# Patient Record
Sex: FEMALE | Race: WHITE | NOT HISPANIC OR LATINO | Employment: OTHER | URBAN - METROPOLITAN AREA
[De-identification: names, ages, dates, MRNs, and addresses within clinical notes are randomized per-mention and may not be internally consistent; named-entity substitution may affect disease eponyms.]

---

## 2023-02-05 PROBLEM — N28.1 ACQUIRED CYST OF KIDNEY: Status: ACTIVE | Noted: 2023-02-05

## 2023-02-05 PROBLEM — I47.10 PAROXYSMAL SUPRAVENTRICULAR TACHYCARDIA (CMS-HCC): Status: ACTIVE | Noted: 2023-02-05

## 2023-02-05 PROBLEM — E53.8 VITAMIN B12 DEFICIENCY: Status: ACTIVE | Noted: 2023-02-05

## 2023-02-05 PROBLEM — J43.9 EMPHYSEMA LUNG (MULTI): Status: ACTIVE | Noted: 2023-02-05

## 2023-02-05 PROBLEM — R94.2 ABNORMAL PULMONARY FUNCTION TEST: Status: ACTIVE | Noted: 2023-02-05

## 2023-02-05 PROBLEM — M54.2 NECK PAIN: Status: ACTIVE | Noted: 2023-02-05

## 2023-02-05 PROBLEM — G47.33 OBSTRUCTIVE SLEEP APNEA: Status: ACTIVE | Noted: 2023-02-05

## 2023-02-05 PROBLEM — N35.919 URETHRAL STRICTURE: Status: ACTIVE | Noted: 2023-02-05

## 2023-02-05 PROBLEM — M47.816 LUMBAR SPONDYLOSIS: Status: ACTIVE | Noted: 2023-02-05

## 2023-02-05 PROBLEM — E55.9 VITAMIN D DEFICIENCY: Status: ACTIVE | Noted: 2023-02-05

## 2023-02-05 PROBLEM — M85.9 DISORDER OF BONE DENSITY AND STRUCTURE, UNSPECIFIED: Status: ACTIVE | Noted: 2023-02-05

## 2023-02-05 PROBLEM — M17.9 OSTEOARTHRITIS, KNEE: Status: ACTIVE | Noted: 2023-02-05

## 2023-02-05 PROBLEM — K59.00 CONSTIPATION: Status: ACTIVE | Noted: 2023-02-05

## 2023-02-05 PROBLEM — R21 ERYTHEMATOUS RASH: Status: ACTIVE | Noted: 2023-02-05

## 2023-02-05 PROBLEM — F41.9 ANXIETY: Status: ACTIVE | Noted: 2023-02-05

## 2023-02-05 PROBLEM — L95.9 VASCULITIS OF SKIN: Status: ACTIVE | Noted: 2023-02-05

## 2023-02-05 PROBLEM — I48.92 ATRIAL FLUTTER (MULTI): Status: ACTIVE | Noted: 2023-02-05

## 2023-02-05 PROBLEM — M51.9 THORACIC DISC DISEASE: Status: ACTIVE | Noted: 2023-02-05

## 2023-02-05 PROBLEM — I48.0 PAROXYSMAL ATRIAL FIBRILLATION (MULTI): Status: ACTIVE | Noted: 2023-02-05

## 2023-02-05 PROBLEM — R89.6 ABNORMAL BLADDER CYTOLOGY: Status: ACTIVE | Noted: 2023-02-05

## 2023-02-05 PROBLEM — Z86.018 HISTORY OF BENIGN BLADDER TUMOR: Status: ACTIVE | Noted: 2023-02-05

## 2023-02-05 PROBLEM — K57.30 DIVERTICULOSIS OF COLON: Status: ACTIVE | Noted: 2023-02-05

## 2023-02-05 PROBLEM — K76.89 HEPATIC CYST: Status: ACTIVE | Noted: 2023-02-05

## 2023-02-05 PROBLEM — R79.89 ELEVATED HOMOCYSTEINE: Status: ACTIVE | Noted: 2023-02-05

## 2023-02-05 PROBLEM — I51.9 DIASTOLIC DYSFUNCTION, LEFT VENTRICLE: Status: ACTIVE | Noted: 2023-02-05

## 2023-02-05 PROBLEM — D64.9 ANEMIA: Status: ACTIVE | Noted: 2023-02-05

## 2023-02-05 PROBLEM — G25.0 ESSENTIAL TREMOR: Status: ACTIVE | Noted: 2023-02-05

## 2023-02-05 PROBLEM — K21.9 CHRONIC GERD: Status: ACTIVE | Noted: 2023-02-05

## 2023-02-05 PROBLEM — R35.1 NOCTURIA: Status: ACTIVE | Noted: 2023-02-05

## 2023-02-05 PROBLEM — M79.2 NEUROPATHIC PAIN OF LEFT LOWER EXTREMITY: Status: ACTIVE | Noted: 2023-02-05

## 2023-02-05 PROBLEM — K64.0 GRADE I HEMORRHOIDS: Status: ACTIVE | Noted: 2023-02-05

## 2023-02-05 PROBLEM — M47.812 SPONDYLOSIS OF CERVICAL REGION WITHOUT MYELOPATHY OR RADICULOPATHY: Status: ACTIVE | Noted: 2023-02-05

## 2023-02-05 PROBLEM — M50.90 CERVICAL DISC DISORDER: Status: ACTIVE | Noted: 2023-02-05

## 2023-02-05 RX ORDER — HYDROGEN PEROXIDE 3 %
1 SOLUTION, NON-ORAL MISCELLANEOUS DAILY
COMMUNITY
End: 2023-10-30

## 2023-02-05 RX ORDER — IBUPROFEN 200 MG
2 CAPSULE ORAL DAILY
COMMUNITY
End: 2023-03-14

## 2023-02-05 RX ORDER — LANOLIN ALCOHOL/MO/W.PET/CERES
1 CREAM (GRAM) TOPICAL EVERY OTHER DAY
COMMUNITY
End: 2023-03-14

## 2023-02-05 RX ORDER — METOPROLOL SUCCINATE 25 MG/1
25 TABLET, EXTENDED RELEASE ORAL DAILY
COMMUNITY
End: 2024-01-29 | Stop reason: SDUPTHER

## 2023-02-05 RX ORDER — MULTIVIT WITH MINERALS/HERBS
1 TABLET ORAL EVERY OTHER DAY
COMMUNITY
End: 2023-03-14

## 2023-02-05 RX ORDER — LACTASE 9000 UNIT
1 TABLET,CHEWABLE ORAL AS NEEDED
COMMUNITY
End: 2023-03-14

## 2023-02-05 RX ORDER — TIZANIDINE 2 MG/1
1 TABLET ORAL EVERY 6 HOURS PRN
COMMUNITY
End: 2023-03-14

## 2023-02-24 ENCOUNTER — DOCUMENTATION (OUTPATIENT)
Dept: PRIMARY CARE | Facility: CLINIC | Age: 78
End: 2023-02-24
Payer: MEDICARE

## 2023-03-14 ENCOUNTER — PATIENT OUTREACH (OUTPATIENT)
Dept: PRIMARY CARE | Facility: CLINIC | Age: 78
End: 2023-03-14
Payer: MEDICARE

## 2023-03-14 DIAGNOSIS — D64.9 ANEMIA, UNSPECIFIED TYPE: ICD-10-CM

## 2023-03-14 DIAGNOSIS — I48.92 ATRIAL FLUTTER, UNSPECIFIED TYPE (MULTI): ICD-10-CM

## 2023-03-14 PROCEDURE — 99490 CHRNC CARE MGMT STAFF 1ST 20: CPT | Performed by: INTERNAL MEDICINE

## 2023-03-14 RX ORDER — OMEGA-3 FATTY ACIDS/FISH OIL 340-1000MG
CAPSULE ORAL
COMMUNITY
End: 2023-09-08 | Stop reason: WASHOUT

## 2023-03-14 RX ORDER — ACETAMINOPHEN 500 MG
2 TABLET ORAL DAILY
COMMUNITY

## 2023-03-18 PROBLEM — M54.2 NECK PAIN: Status: RESOLVED | Noted: 2023-02-05 | Resolved: 2023-03-18

## 2023-03-18 PROBLEM — Z00.00 ROUTINE ADULT HEALTH MAINTENANCE: Chronic | Status: ACTIVE | Noted: 2023-03-18

## 2023-03-18 PROBLEM — Z00.00 ROUTINE ADULT HEALTH MAINTENANCE: Status: ACTIVE | Noted: 2023-03-18

## 2023-03-18 PROBLEM — Z87.19 H/O GASTRITIS: Status: ACTIVE | Noted: 2023-03-18

## 2023-03-18 PROBLEM — Z71.89 CARDIAC RISK COUNSELING: Status: ACTIVE | Noted: 2023-03-18

## 2023-03-18 PROBLEM — Z87.19 H/O ESOPHAGITIS: Status: ACTIVE | Noted: 2023-03-18

## 2023-03-18 PROBLEM — Z71.89 ADVANCED DIRECTIVES, COUNSELING/DISCUSSION: Status: ACTIVE | Noted: 2023-03-18

## 2023-03-18 PROBLEM — Z13.89 SCREENING FOR MULTIPLE CONDITIONS: Status: ACTIVE | Noted: 2023-03-18

## 2023-03-24 ENCOUNTER — OFFICE VISIT (OUTPATIENT)
Dept: PRIMARY CARE | Facility: CLINIC | Age: 78
End: 2023-03-24
Payer: MEDICARE

## 2023-03-24 VITALS
WEIGHT: 151 LBS | HEART RATE: 78 BPM | SYSTOLIC BLOOD PRESSURE: 122 MMHG | BODY MASS INDEX: 23.65 KG/M2 | DIASTOLIC BLOOD PRESSURE: 77 MMHG

## 2023-03-24 DIAGNOSIS — I48.0 PAROXYSMAL ATRIAL FIBRILLATION (MULTI): ICD-10-CM

## 2023-03-24 DIAGNOSIS — G47.33 OBSTRUCTIVE SLEEP APNEA: ICD-10-CM

## 2023-03-24 DIAGNOSIS — K59.00 CONSTIPATION, UNSPECIFIED CONSTIPATION TYPE: ICD-10-CM

## 2023-03-24 DIAGNOSIS — I48.92 ATRIAL FLUTTER, UNSPECIFIED TYPE (MULTI): ICD-10-CM

## 2023-03-24 DIAGNOSIS — I47.10 PAROXYSMAL SUPRAVENTRICULAR TACHYCARDIA (CMS-HCC): ICD-10-CM

## 2023-03-24 DIAGNOSIS — J43.9 PULMONARY EMPHYSEMA, UNSPECIFIED EMPHYSEMA TYPE (MULTI): ICD-10-CM

## 2023-03-24 DIAGNOSIS — I48.20 CHRONIC ATRIAL FIBRILLATION, UNSPECIFIED (MULTI): ICD-10-CM

## 2023-03-24 DIAGNOSIS — E55.9 VITAMIN D DEFICIENCY: ICD-10-CM

## 2023-03-24 DIAGNOSIS — N18.31 CHRONIC KIDNEY DISEASE, STAGE 3A (MULTI): ICD-10-CM

## 2023-03-24 DIAGNOSIS — N18.9 CHRONIC KIDNEY DISEASE, UNSPECIFIED CKD STAGE: ICD-10-CM

## 2023-03-24 DIAGNOSIS — E53.8 VITAMIN B12 DEFICIENCY: ICD-10-CM

## 2023-03-24 DIAGNOSIS — M06.9 RHEUMATOID ARTHRITIS, INVOLVING UNSPECIFIED SITE, UNSPECIFIED WHETHER RHEUMATOID FACTOR PRESENT (MULTI): Primary | ICD-10-CM

## 2023-03-24 PROCEDURE — 1157F ADVNC CARE PLAN IN RCRD: CPT | Performed by: INTERNAL MEDICINE

## 2023-03-24 PROCEDURE — 1036F TOBACCO NON-USER: CPT | Performed by: INTERNAL MEDICINE

## 2023-03-24 PROCEDURE — 99215 OFFICE O/P EST HI 40 MIN: CPT | Performed by: INTERNAL MEDICINE

## 2023-03-24 RX ORDER — LANOLIN ALCOHOL/MO/W.PET/CERES
100 CREAM (GRAM) TOPICAL EVERY OTHER DAY
COMMUNITY
End: 2023-11-02 | Stop reason: ALTCHOICE

## 2023-03-24 RX ORDER — MULTIVITAMIN/IRON/FOLIC ACID 18MG-0.4MG
1 TABLET ORAL EVERY OTHER DAY
COMMUNITY
End: 2023-11-02 | Stop reason: ALTCHOICE

## 2023-03-24 ASSESSMENT — PATIENT HEALTH QUESTIONNAIRE - PHQ9
2. FEELING DOWN, DEPRESSED OR HOPELESS: NOT AT ALL
SUM OF ALL RESPONSES TO PHQ9 QUESTIONS 1 AND 2: 0
1. LITTLE INTEREST OR PLEASURE IN DOING THINGS: NOT AT ALL

## 2023-03-24 NOTE — PROGRESS NOTES
ASSESSMENT/PLAN  Problem List Items Addressed This Visit          High    Atrial flutter (CMS/HCC)    Overview     s/p EP cardiology consult 2022  [id number] score of 3 for age and female gender.   Continue with Eliquis, BB   Monitor           Relevant Orders    Comprehensive Metabolic Panel    CBC and Auto Differential    Emphysema lung (CMS/[facility])    Current Assessment & Plan     Not medicated  Seeing Pulm soon  Await recs         Paroxysmal atrial fibrillation (CMS/[facility])    Overview     7/5/22: Atrial fibrillation with rapid ventricular response, ? Secondary to colitis/infection  Holter x 40 hours 8/22 did not see AF, but [id number] score of 3  On Eliquis and BB  Monitor         Relevant Orders    In-Center Sleep Study (Non-Sleep Provider Only)    Comprehensive Metabolic Panel    CBC and Auto Differential    Paroxysmal supraventricular tachycardia (CMS/HCC)    Overview     Holter 10/22: Sinus karen/tachycardia. , average 74. 741 PVCs (0.05% burden), 8565 PSVCs (0.58%), 23 occurrences of PSVT, longest 34 beats, fastest 162  1 reported event  On BB (and Eliquis due to hx PAF)  Monitor         Current Assessment & Plan     Sleep Study ordered         Relevant Orders    Comprehensive Metabolic Panel    CBC and Auto Differential    Rheumatoid arthritis, involving unspecified site, unspecified whether rheumatoid factor present (CMS/[facility]) - Primary    Overview     Stable  Aymptomatic  Monitor         Relevant Orders    TSH with reflex to Free T4 if abnormal    Comprehensive Metabolic Panel    CBC and Auto Differential       Medium    Constipation    Overview     Resolved with ducolax  monitor         Current Assessment & Plan     Cscope ordered         Relevant Orders    Colonoscopy    Comprehensive Metabolic Panel    CBC and Auto Differential    Obstructive sleep apnea    Overview     DOes not use CPAP         Current Assessment & Plan     Will get PSG         Relevant Orders    In-Center Sleep Study (Non-Sleep  "Provider Only)    Vitamin B12 deficiency    Relevant Orders    Vitamin B12    Vitamin D deficiency    Overview     Goal 40-50  on supplement         Relevant Orders    Vitamin D, Total    Chronic kidney disease, stage 3a    Relevant Orders    Urinalysis with Reflex Microscopic    Comprehensive Metabolic Panel    Lipid Panel     Other Visit Diagnoses       Chronic atrial fibrillation, unspecified (CMS/HCC)        Relevant Orders    TSH with reflex to Free T4 if abnormal    Comprehensive Metabolic Panel    CBC and Auto Differential    Chronic kidney disease, unspecified CKD stage        Relevant Orders    Lipid Panel              Chief Complaint/HPI: 6 month follow up     ROS otherwise negative aside from what was mentioned above in HPI.  Saw Jane  Reviewed both notes   (Copied)  Impressions  1) atrial flutter: No symptomatic recurrence. Remains on metoprolol and Eliquis. I explained her elevated [id number]-PHVx score of 3 for age and female gender. Do recommend that she continue with Eliquis.  She will be establishing with an electrophysiologist later this year.  2) follow-up: 6 months or sooner if needed   ----  IMPRESSIONS:  1.  Paroxysmal atrial arrhythmias, with both atrial flutter and atrial fibrillation documented in July 2022.  The etiology of the rhythms remains unclear, perhaps related to acute illness from colitis at that time.  The patient may also have subclinical obstructive sleep apnea as a contributory.  She appears to have a healthy heart otherwise, with no structural or ischemic disease.  Her atrial arrhythmia burden is felt to be quite low at present, and she is on a simple \"rate control strategy\" with beta-blockers, along with Eliquis anticoagulation, given her [id number] score of at least 3 (female gender, and 2 points for age over 75).  It is not yet clear that she warrants antiarrhythmic therapy or ablation.  2.  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  3.  Benign essential tremor, ongoing in spite of metoprolol therapy.  4.  Possible occult obstructive sleep apnea, as noted above.  RECOMMENDATIONS:  1.  The patient will continue her \"rate control strategy\" for now.  2.  Her Toprol-XL 25 mg daily will be replaced by Inderal LA 60 mg daily, to provide ongoing beta-blockade but also better treatment of her benign essential tremor.  3.  She will continue Eliquis anticoagulation indefinitely.  4.  If her recent event monitor shows a significant burden of atrial arrhythmias, I will consider her for specific antiarrhythmic therapy (e.g. flecainide) or for ablation therapy.  If atrial flutter appears to be her dominant clinical arrhythmia, a cavotricuspid isthmus ablation would certainly be reasonable.  5.  She will follow-up with Dr. Tim and be considered for another sleep study.  6.  She will continue to be seen by Dr. Burnett, with an appointment in 4 to 6 months and then another appointment with me in 12 months.  7.  With documented sustained atrial arrhythmias in the interim, I will be happy to see her and address management  --  Didn't siwtch to Propranolol  Had monitor:   Sinus karen/tachycardia. 31761, average 74. 741 PVCs (0.05% burden), 8565 PSVCs (0.58%), 23 occurrences of PSVT, longest 34 beats, fastest 162 1 reported event 8/22: HR [address], average 94. Rare PACs, PVCs, no AF (40hours) 7 events recorded, all autotriggered     Rec'd sleep study, she hasnt done this yet  Urology rec'd it also    Weaned PPI to 20mg, no issues    She saw Dr Becerril at [facility] (copied):  ASSESSMENT/PLAN:  1. Screening for genitourinary condition - ICD9: V81.6, ICD10: Z13.89 (primary diagnosis)  - UA DIP, URINE (POC)  2. Nocturia - ICD9: 788.43, ICD10: R35.1  - URINALYSIS, WITH MICROSCOPIC  3. Microhematuria - ICD9: 599.72, ICD10: R31.29  urine sent for micro  discussed " voiding log  check if > 30% of noct output  possible PM dose of Lasix around 6 pm if ok with Dr Tim  possible other OAB trials eg Botox vs Interstim  FU with me or REZA   ---  Seeing Pulm next month    Patient Active Problem List   Diagnosis    Abnormal bladder cytology    Abnormal pulmonary function test    Acquired cyst of kidney    Anemia, unspecified    Anxiety    Atrial flutter (CMS/HCC)    Cervical disc disease    Thoracic disc disease    Chronic GERD    Constipation    Diastolic dysfunction, left ventricle    Disorder of bone density and structure, unspecified    Diverticulosis of colon    Elevated homocysteine    Emphysema lung (CMS/HCC)    Erythematous rash    Essential tremor    Grade I hemorrhoids    History of benign bladder tumor    Hepatic cyst    Lumbar spondylosis    Neuropathic pain of left lower extremity    Nocturia    Obstructive sleep apnea    Osteoarthritis, knee    Paroxysmal atrial fibrillation (CMS/HCC)    Paroxysmal supraventricular tachycardia (CMS/HCC)    Spondylosis of cervical region without myelopathy or radiculopathy    Urethral stricture    Vasculitis of skin    Vitamin B12 deficiency    Vitamin D deficiency    Routine adult health maintenance    Advanced directives, counseling/discussion    Cardiac risk counseling    H/O esophagitis    H/O gastritis    Screening for multiple conditions    Rheumatoid arthritis, involving unspecified site, unspecified whether rheumatoid factor present (CMS/HCC)    Chronic kidney disease, stage 3a       ALLERGIES  Amoxicillin-pot clavulanate, Celecoxib, Diazepam, Enoxaparin, Gabapentin, Meloxicam, Mometasone, Naproxen, Nitrofurantoin macrocrystal, Nitrofurantoin monohyd/m-cryst, Piroxicam, Propranolol, Ranitidine hcl, and Amitriptyline    MEDICATIONS  Current Outpatient Medications   Medication Instructions    apixaban (Eliquis) 5 mg tablet 1 tablet, oral, 2 times daily    b complex 0.4 mg tablet 1 tablet, oral, Every other day    calcium  carbonate-vitamin D3 600 mg-20 mcg (800 unit) tablet 2 tablets, oral, Daily    cyanocobalamin (VITAMIN B-12) 100 mcg, oral, Every other day    esomeprazole (NexIUM) 20 mg DR capsule 1 capsule, oral, Daily    metoprolol succinate XL (TOPROL-XL) 25 mg, oral, Daily    omega-3 fatty acids-fish oil (Fish OiL) 340-1,000 mg capsule oral        PHYSICAL EXAM  /77   Pulse 78   Wt 68.5 kg (151 lb)   BMI 23.65 kg/m²   Body mass index is 23.65 kg/m².  Gen: Alert, NAD  HEENT:  PERRLA, EOMI, conjunctiva and sclera normal in appearance  Respiratory:  Lungs CTAB  Cardiovascular:  Heart RRR. No M/R/G, trace LE edema  Neuro:  Gross motor and sensory intact; + ET  Skin:  No suspicious lesions present

## 2023-04-13 ENCOUNTER — PATIENT OUTREACH (OUTPATIENT)
Dept: PRIMARY CARE | Facility: CLINIC | Age: 78
End: 2023-04-13
Payer: MEDICARE

## 2023-04-13 DIAGNOSIS — J43.9 PULMONARY EMPHYSEMA, UNSPECIFIED EMPHYSEMA TYPE (MULTI): ICD-10-CM

## 2023-04-13 DIAGNOSIS — I48.92 ATRIAL FLUTTER, UNSPECIFIED TYPE (MULTI): ICD-10-CM

## 2023-04-13 PROCEDURE — 99490 CHRNC CARE MGMT STAFF 1ST 20: CPT | Performed by: INTERNAL MEDICINE

## 2023-04-13 NOTE — CARE PLAN
HAPPY BIRTHDAY!  Spoke with pt.  She is doing well this month.  BP stable.  Walking more.  Ideally would like to work toward doing lyndon or jazzercise  Scheduled 5/22 for endoscopy/colonoscopy  Will stop eliquis 2 days before procedure    Saw Dr Becerril [facility] (copied from chart):  Lifestyle modifications such as,  Use compression stockings. Lower extremity edema can redistribute intravascularly at nighttime when supine and cause nocturia. Consider referral to occupational therapy for fitting of stockings. , Elevate legs in late afternoon/early evening, approximately four hours before bedtime. , Minimize caffeine and bladder irritants. , Minimize fluid intake four hours before bedtime., and If any risk factors for obstructive sleep apnea, consider referral to sleep medicine. Medical therapy such as,    Timing a low dose diuretic in the afternoon to complete diuresis prior to bedtime.  Pt kept log on input/output.    Ringing in ears  Has history of this issue  Never truly resolved  But recently has gotten louder

## 2023-05-12 ENCOUNTER — PATIENT OUTREACH (OUTPATIENT)
Dept: PRIMARY CARE | Facility: CLINIC | Age: 78
End: 2023-05-12
Payer: MEDICARE

## 2023-05-12 DIAGNOSIS — I48.92 ATRIAL FLUTTER, UNSPECIFIED TYPE (MULTI): ICD-10-CM

## 2023-05-12 DIAGNOSIS — J43.9 PULMONARY EMPHYSEMA, UNSPECIFIED EMPHYSEMA TYPE (MULTI): ICD-10-CM

## 2023-05-12 PROCEDURE — 99490 CHRNC CARE MGMT STAFF 1ST 20: CPT | Performed by: INTERNAL MEDICINE

## 2023-05-12 NOTE — CARE PLAN
Problem: Access to Care Issue  Goal: Assess and Address Access Barriers  Outcome: Progressing   Seeing Dr. Becerril in June to follow up for bladder issues  Received copy to her to record input/output  Goes on 6/12 for in office Sleep Study  [facility]  Colonoscopy rescheduled to July    Copied from  [employer]:  Adelia is a 78 year-old female with a history of atrial fibrillation/flutter, diastolic dysfunction, esophagitis, and degenerative joint disease here for follow-up.    Overall doing well. Denies any recent palpitations, lightheadedness, or loss of consciousness.    Very rarely will have left greater than right lower extremity swelling. She had ordered prescription compression socks when she had leg swelling in the past however by the time she actually received the stockings her leg swelling had gone back to normal.    She established with Dr. Crowell at the end of last year. I reviewed his note from December 2022.    She tells me she is friends with Pippa Perez who was a friend of my wife's late grandmother.    14-day monitor October 2022 revealing predominantly sinus rhythm with no evidence of atrial fibrillation or flutter. Episodes of SVT with longest of 34 beats and fastest at 162 bpm.    Nuclear stress test 9/9/2022: No evidence of ischemia or scar. EF greater than 65%.    48-hour monitor August 2022: Predominantly sinus rhythm with rare PACs and PVCs.    Catheterization September 2013: Angiographically normal right dominant system, EF 70%.     Impressions    1) atrial flutter: No symptomatic recurrence. Remains on metoprolol and Eliquis.     2) follow-up: 12 months or sooner if needed

## 2023-06-13 ENCOUNTER — PATIENT OUTREACH (OUTPATIENT)
Dept: PRIMARY CARE | Facility: CLINIC | Age: 78
End: 2023-06-13
Payer: MEDICARE

## 2023-06-13 ENCOUNTER — TELEPHONE (OUTPATIENT)
Dept: PRIMARY CARE | Facility: CLINIC | Age: 78
End: 2023-06-13

## 2023-06-13 DIAGNOSIS — N18.31 CHRONIC KIDNEY DISEASE, STAGE 3A (MULTI): ICD-10-CM

## 2023-06-13 DIAGNOSIS — I48.92 ATRIAL FLUTTER, UNSPECIFIED TYPE (MULTI): ICD-10-CM

## 2023-06-13 PROCEDURE — 99439 CHRNC CARE MGMT STAF EA ADDL: CPT | Performed by: INTERNAL MEDICINE

## 2023-06-13 PROCEDURE — 99490 CHRNC CARE MGMT STAFF 1ST 20: CPT | Performed by: INTERNAL MEDICINE

## 2023-06-13 NOTE — TELEPHONE ENCOUNTER
Received a message requesting appointment for back pain and anxiety. Spoke with patient to schedule with NP

## 2023-06-13 NOTE — PROGRESS NOTES
Problem: Risk of Exacerbation, or Readmission to Hospital Related to Symptoms of Comorbidities  Goal: Knowledge and Symptom Management of Chronic Disease will be Documented  Outcome: Progressing  Pt completed sleep study yesterday  Did not have a great experience as she had difficulty actually sleeping    Has had lower back pain  Worse after sitting or bending down  Gets sharp pain  Difficult to walk or move afterward  Has noticed when she reaches up and stretches her right arm  This usually alleviates the sharp pain    Problem: Coordination of Psychosocial Support Services Needed  Goal: Coordination of Services will be Obtained  Outcome: Progressing  Pt sees pychologist at [facility]  Was suggested to her to consider anti anxiety agent  In the past she has tried clonazepam and diazepam  I advised it would be helpful to discuss with provider at appointment    Problem: Risk of Uncoordinated Care  Goal: Care will be Coordinated and Supported by a Multidisciplinary Team of Providers  Outcome: Progressing  Next month has the following appointments  Endoscopy/Colonoscopy  Opthamology    Copied from chart:  Office visit with Dr. Becerril 6/8/23  IMPRESSION:    -Nocturia/Nocturnal polyuria-We reviewed treatment modalities for nocturnal polyuria   Lifestyle modifications such as, Minimize caffeine and bladder irritants. , Minimize fluid intake four hours before bedtime., and If any risk factors for obstructive sleep apnea, consider referral to sleep medicine.   Medical therapy such as, Timing a low dose diuretic in the afternoon to complete diuresis prior to bedtime.  At this point, the patient would like to pursue will get sleep study Monday     let me know if we need to rx Lasix 10mg a day at around 5/6 pm  sleep study Monday and if apnea, consider CPAP and this may help her nocturia    diary sent for scanning  shows about 30% noc polyuria volume    I have personally performed a face to face diagnostic  evaluation on this patient. My findings are as follows:.    Guero Becerril MD    I spent a total of 30 minutes on the date of the service which included preparing to see the patient, face-to-face patient care, completing clinical documentation, obtaining and/or reviewing separately obtained history, and counseling and educating the patient/family/caregiver.

## 2023-06-14 PROBLEM — L95.9 VASCULITIS OF SKIN: Status: RESOLVED | Noted: 2023-02-05 | Resolved: 2023-06-14

## 2023-06-14 PROBLEM — R21 ERYTHEMATOUS RASH: Status: RESOLVED | Noted: 2023-02-05 | Resolved: 2023-06-14

## 2023-06-14 PROBLEM — R94.2 ABNORMAL PULMONARY FUNCTION TEST: Status: RESOLVED | Noted: 2023-02-05 | Resolved: 2023-06-14

## 2023-06-20 ENCOUNTER — OFFICE VISIT (OUTPATIENT)
Dept: PRIMARY CARE | Facility: CLINIC | Age: 78
End: 2023-06-20
Payer: MEDICARE

## 2023-06-20 VITALS
HEART RATE: 75 BPM | TEMPERATURE: 98.1 F | BODY MASS INDEX: 23.46 KG/M2 | DIASTOLIC BLOOD PRESSURE: 69 MMHG | SYSTOLIC BLOOD PRESSURE: 119 MMHG | WEIGHT: 149.8 LBS | OXYGEN SATURATION: 95 %

## 2023-06-20 DIAGNOSIS — J34.89 RHINORRHEA: ICD-10-CM

## 2023-06-20 DIAGNOSIS — M54.50 LUMBAR PAIN: Primary | ICD-10-CM

## 2023-06-20 DIAGNOSIS — F41.9 ANXIETY: ICD-10-CM

## 2023-06-20 DIAGNOSIS — M53.3 COCCYDYNIA: ICD-10-CM

## 2023-06-20 DIAGNOSIS — F32.1 CURRENT MODERATE EPISODE OF MAJOR DEPRESSIVE DISORDER WITHOUT PRIOR EPISODE (MULTI): ICD-10-CM

## 2023-06-20 PROCEDURE — 1036F TOBACCO NON-USER: CPT | Performed by: NURSE PRACTITIONER

## 2023-06-20 PROCEDURE — 1157F ADVNC CARE PLAN IN RCRD: CPT | Performed by: NURSE PRACTITIONER

## 2023-06-20 PROCEDURE — 1159F MED LIST DOCD IN RCRD: CPT | Performed by: NURSE PRACTITIONER

## 2023-06-20 PROCEDURE — 99214 OFFICE O/P EST MOD 30 MIN: CPT | Performed by: NURSE PRACTITIONER

## 2023-06-20 RX ORDER — METHYLPREDNISOLONE 4 MG/1
TABLET ORAL
Qty: 21 TABLET | Refills: 0 | Status: SHIPPED | OUTPATIENT
Start: 2023-06-20 | End: 2023-06-23 | Stop reason: SINTOL

## 2023-06-20 RX ORDER — ESCITALOPRAM OXALATE 5 MG/1
5 TABLET ORAL DAILY
Qty: 90 TABLET | Refills: 3 | Status: SHIPPED | OUTPATIENT
Start: 2023-06-20 | End: 2023-09-13 | Stop reason: DRUGHIGH

## 2023-06-20 RX ORDER — TIZANIDINE 2 MG/1
2 TABLET ORAL EVERY 6 HOURS PRN
Qty: 30 TABLET | Refills: 0 | Status: SHIPPED | OUTPATIENT
Start: 2023-06-20 | End: 2023-06-28 | Stop reason: ALTCHOICE

## 2023-06-20 ASSESSMENT — PATIENT HEALTH QUESTIONNAIRE - PHQ9
7. TROUBLE CONCENTRATING ON THINGS, SUCH AS READING THE NEWSPAPER OR WATCHING TELEVISION: NOT AT ALL
3. TROUBLE FALLING OR STAYING ASLEEP: NEARLY EVERY DAY
4. FEELING TIRED OR HAVING LITTLE ENERGY: SEVERAL DAYS
SUM OF ALL RESPONSES TO PHQ9 QUESTIONS 1 & 2: 4
SUM OF ALL RESPONSES TO PHQ QUESTIONS 1-9: 8
1. LITTLE INTEREST OR PLEASURE IN DOING THINGS: MORE THAN HALF THE DAYS
10. IF YOU CHECKED OFF ANY PROBLEMS, HOW DIFFICULT HAVE THESE PROBLEMS MADE IT FOR YOU TO DO YOUR WORK, TAKE CARE OF THINGS AT HOME, OR GET ALONG WITH OTHER PEOPLE: NOT DIFFICULT AT ALL
5. POOR APPETITE OR OVEREATING: NOT AT ALL
9. THOUGHTS THAT YOU WOULD BE BETTER OFF DEAD, OR OF HURTING YOURSELF: NOT AT ALL
8. MOVING OR SPEAKING SO SLOWLY THAT OTHER PEOPLE COULD HAVE NOTICED. OR THE OPPOSITE, BEING SO FIGETY OR RESTLESS THAT YOU HAVE BEEN MOVING AROUND A LOT MORE THAN USUAL: NOT AT ALL
2. FEELING DOWN, DEPRESSED OR HOPELESS: MORE THAN HALF THE DAYS
6. FEELING BAD ABOUT YOURSELF - OR THAT YOU ARE A FAILURE OR HAVE LET YOURSELF OR YOUR FAMILY DOWN: NOT AT ALL

## 2023-06-20 ASSESSMENT — ANXIETY QUESTIONNAIRES
3. WORRYING TOO MUCH ABOUT DIFFERENT THINGS: NEARLY EVERY DAY
GAD7 TOTAL SCORE: 9
5. BEING SO RESTLESS THAT IT IS HARD TO SIT STILL: NOT AT ALL
6. BECOMING EASILY ANNOYED OR IRRITABLE: NEARLY EVERY DAY
7. FEELING AFRAID AS IF SOMETHING AWFUL MIGHT HAPPEN: NOT AT ALL
2. NOT BEING ABLE TO STOP OR CONTROL WORRYING: SEVERAL DAYS
1. FEELING NERVOUS, ANXIOUS, OR ON EDGE: MORE THAN HALF THE DAYS
IF YOU CHECKED OFF ANY PROBLEMS ON THIS QUESTIONNAIRE, HOW DIFFICULT HAVE THESE PROBLEMS MADE IT FOR YOU TO DO YOUR WORK, TAKE CARE OF THINGS AT HOME, OR GET ALONG WITH OTHER PEOPLE: NOT DIFFICULT AT ALL
4. TROUBLE RELAXING: NOT AT ALL

## 2023-06-20 NOTE — PROGRESS NOTES
Subjective   Patient ID: Adelia Munson is a 78 y.o. female who presents for Sick Visit.    Back pain   low backright side and right hip   has had pain for years   has gotten worse Sunday   thought about calling squad pain was so bad,   using heat and cold on it has not helped  Has seen chiro  Worse with sitting on soft chair and getting up  Bending down to get something  Causes a spasm  sharp    Having nasal congestion  X2 days  Slept with window open  Normally doesn't have allergy  Coughing    Anxiety clinical psychologist suggested looking into medication with pcp  Noisy neighbors  Health issues  Find herself anxious and stress        Review of Systems  ROS completely negative except what was mentioned in the HPI.  Problem List, surgical, social, and family histories which were reviewed and updated as necessary within the EMR. I also personally reviewed the notes, labs, and imaging that pertained to what was documented or discussed in the HPI.      Objective   Physical Exam  Vitals and nursing note reviewed.   Constitutional:       General: She is not in acute distress.     Appearance: Normal appearance.   HENT:      Head: Normocephalic and atraumatic.      Right Ear: External ear normal.      Left Ear: External ear normal.      Nose: Nose normal.      Mouth/Throat:      Mouth: Mucous membranes are moist.   Eyes:      Extraocular Movements: Extraocular movements intact.      Conjunctiva/sclera: Conjunctivae normal.      Pupils: Pupils are equal, round, and reactive to light.   Cardiovascular:      Rate and Rhythm: Normal rate and regular rhythm.      Heart sounds: Normal heart sounds.   Pulmonary:      Effort: Pulmonary effort is normal.      Breath sounds: Normal breath sounds.   Musculoskeletal:      Cervical back: Normal range of motion and neck supple.      Comments: Scoliosis  Pain upon palpation of right low back/hip  Uses walker for ambulation   Skin:     General: Skin is warm and dry.   Neurological:       General: No focal deficit present.      Mental Status: She is alert and oriented to person, place, and time. Mental status is at baseline.   Psychiatric:         Mood and Affect: Mood normal.         Behavior: Behavior normal.         Thought Content: Thought content normal.         Judgment: Judgment normal.       Over the last 2 weeks, how often have you been bothered by any of the following problems?  Feeling nervous, anxious, or on edge: More than half the days  Not being able to stop or control worrying: Several days  Worrying too much about different things: Nearly every day  Trouble relaxing: Not at all  Being so restless that it is hard to sit still: Not at all  Becoming easily annoyed or irritable: Nearly every day  Feeling afraid as if something awful might happen: Not at all  MADDIE-7 Total Score: 9     Over the past 2 weeks, how often have you been bothered by any of the following problems?  Trouble falling or staying asleep, or sleeping too much: Nearly every day  Feeling tired or having little energy: Several days  Poor appetite or overeating: Not at all  Feeling bad about yourself - or that you are a failure or have let yourself or your family down: Not at all  Trouble concentrating on things, such as reading the newspaper or watching television: Not at all  Moving or speaking so slowly that other people could have noticed? Or the opposite - being so fidgety or restless that you have been moving around a lot more than usual.: Not at all  Thoughts that you would be better off dead or hurting yourself in some way: Not at all  Patient Health Questionnaire-9 Score: 8     /69   Pulse 75   Temp 36.7 °C (98.1 °F)   Wt 67.9 kg (149 lb 12.8 oz)   SpO2 95%   BMI 23.46 kg/m²     Assessment/Plan   Problem List Items Addressed This Visit       Anxiety     MADDIE = 9, PHQ = 8  Discussed risks and benefits to Lexapro  Pt agreeable  She also sees therapist         Relevant Medications    escitalopram (Lexapro) 5  mg tablet    Current moderate episode of major depressive disorder without prior episode (CMS/[facility])     MADDIE = 9, PHQ = 8  Discussed risks and benefits to Lexapro  Pt agreeable  She also sees therapist         Lumbar pain - Primary     Past ortho has given her medrol  I gave her muscle relaxer in past  Will give both due to exacerbation in symptoms  PT ordered, she will go through [facility]  XR           Relevant Medications    methylPREDNISolone (Medrol Dospak) 4 mg tablets    tiZANidine (Zanaflex) 2 mg tablet    Other Relevant Orders    XR lumbar spine complete 4+ views    XR sacrum coccyx 2+ views    Referral to Physical Therapy     Other Visit Diagnoses       Rhinorrhea

## 2023-06-20 NOTE — ASSESSMENT & PLAN NOTE
MADDIE = 9, PHQ = 8  Discussed risks and benefits to Lexapro  Pt agreeable  She also sees therapist

## 2023-06-20 NOTE — PATIENT INSTRUCTIONS
Start medrol dose pack  Take Zanaflex as needed for muscle spasm  - can start with 1/2 tab to see how you feel    After steroid pack, can start lexapro  Start with 1/2 tab for a week, if tolerating, go to full tab

## 2023-06-20 NOTE — ASSESSMENT & PLAN NOTE
Past ortho has given her medrol  I gave her muscle relaxer in past  Will give both due to exacerbation in symptoms  PT ordered, she will go through [facility]  XR

## 2023-06-21 ENCOUNTER — APPOINTMENT (OUTPATIENT)
Dept: PRIMARY CARE | Facility: CLINIC | Age: 78
End: 2023-06-21
Payer: MEDICARE

## 2023-06-22 ENCOUNTER — TELEPHONE (OUTPATIENT)
Dept: PRIMARY CARE | Facility: CLINIC | Age: 78
End: 2023-06-22
Payer: MEDICARE

## 2023-06-22 DIAGNOSIS — M54.50 LUMBAR PAIN: ICD-10-CM

## 2023-06-22 DIAGNOSIS — M50.90 CERVICAL DISC DISEASE: ICD-10-CM

## 2023-06-22 DIAGNOSIS — M51.9 THORACIC DISC DISEASE: ICD-10-CM

## 2023-06-22 DIAGNOSIS — M47.816 LUMBAR SPONDYLOSIS: ICD-10-CM

## 2023-06-22 DIAGNOSIS — M47.812 SPONDYLOSIS OF CERVICAL REGION WITHOUT MYELOPATHY OR RADICULOPATHY: ICD-10-CM

## 2023-06-22 NOTE — TELEPHONE ENCOUNTER
I called the patient to relay xray results  Which were relayed.  She asked me to fax the sleep study results to Dr. Burnett and Dr. Durham  I will fax this to their offices.

## 2023-06-23 ENCOUNTER — TELEPHONE (OUTPATIENT)
Dept: PRIMARY CARE | Facility: CLINIC | Age: 78
End: 2023-06-23

## 2023-06-23 ENCOUNTER — OFFICE VISIT (OUTPATIENT)
Dept: PRIMARY CARE | Facility: CLINIC | Age: 78
End: 2023-06-23
Payer: MEDICARE

## 2023-06-23 VITALS
HEIGHT: 67 IN | HEART RATE: 92 BPM | OXYGEN SATURATION: 95 % | TEMPERATURE: 98.1 F | SYSTOLIC BLOOD PRESSURE: 126 MMHG | DIASTOLIC BLOOD PRESSURE: 70 MMHG | WEIGHT: 150 LBS | BODY MASS INDEX: 23.54 KG/M2

## 2023-06-23 DIAGNOSIS — R68.89 FLU-LIKE SYMPTOMS: Primary | ICD-10-CM

## 2023-06-23 DIAGNOSIS — R10.84 GENERALIZED ABDOMINAL PAIN: ICD-10-CM

## 2023-06-23 DIAGNOSIS — M50.90 CERVICAL DISC DISEASE: ICD-10-CM

## 2023-06-23 DIAGNOSIS — M51.9 LUMBAR DISC DISEASE: ICD-10-CM

## 2023-06-23 DIAGNOSIS — M51.9 THORACIC DISC DISEASE: ICD-10-CM

## 2023-06-23 DIAGNOSIS — M17.0 PRIMARY OSTEOARTHRITIS OF BOTH KNEES: ICD-10-CM

## 2023-06-23 PROBLEM — M54.50 LUMBAR PAIN: Status: RESOLVED | Noted: 2023-06-20 | Resolved: 2023-06-23

## 2023-06-23 PROBLEM — R10.9 ABDOMINAL PAIN: Status: ACTIVE | Noted: 2023-06-23

## 2023-06-23 PROCEDURE — 99214 OFFICE O/P EST MOD 30 MIN: CPT | Performed by: NURSE PRACTITIONER

## 2023-06-23 PROCEDURE — 1157F ADVNC CARE PLAN IN RCRD: CPT | Performed by: NURSE PRACTITIONER

## 2023-06-23 PROCEDURE — 87635 SARS-COV-2 COVID-19 AMP PRB: CPT

## 2023-06-23 PROCEDURE — 1036F TOBACCO NON-USER: CPT | Performed by: NURSE PRACTITIONER

## 2023-06-23 PROCEDURE — 1159F MED LIST DOCD IN RCRD: CPT | Performed by: NURSE PRACTITIONER

## 2023-06-23 NOTE — TELEPHONE ENCOUNTER
"Regarding: Side effects  Contact: [phone]  ----- Message from Gracie Tim MD sent at 6/23/2023 10:21 AM EDT -----  Its hard for me to make recommendations as I didn't see/examine her  Can see if she wants to schedule a f/u appt with Keysha Roach NP today while I am here and if anything needed from me I can assist Keysha with decision/POC     ----- Message from Adelia Munson to LUZ Hernandez-CNP sent at 6/23/2023 10:09 AM -----   The past two nights I have woke up in the middle of the night with excruciating lower abdominal pain/cramps.  The only change in medication so far has been the addition of Tizanidine 2 half pills a day.  In reading the side effects, \"lower stomach pain or pelvic pain\" were mentioned.  I want to stop this medication.    In my email to you yesterday, I questioned taking the Methylprednisolone because of my health issues last summer after taking it along with other medications.  Maybe I should start taking it tomorrow, as the pain in the left side of my back is really bad.  Your thoughts?  I will be home all day if you want to call me.    I'm also experiencing a pain behind my right knee, which I think might have been caused by the fact that I have to scoot across the bed at night so I can sleep  sitting-up because of the pain in my back.    The cold/virus hasn't changed much except that I don't have laryngitis anymore.  Still have cough and drainage.  Napping more.    Adelia  "

## 2023-06-23 NOTE — PROGRESS NOTES
Subjective   Patient ID: Adelia Munson is a 78 y.o. female who presents for Medication Reaction.    Fx tailbone at age 17 from ice skating    Cold/virus hasn't changed much  Still have cough and drainage.  Napping more.    Discuss methylprednisolone usage  Hx of tachycardia on it  And zaniflex  The past two nights woke up in the middle of the night with excruciating lower abdominal pain/cramps.    No n/v/d  Just dry heaves of acid reflux  No pain in abdomen today  Seems only at night  The only change in medication so far has been the addition of Tizanidine 2 half pills a day.  Pain in back is less than the last appt  Started on left, now on right    Pain behind right knee,   possibly caused by scooting across the bed at night to be able to sleep sitting-up because of the pain in the back.  On eliquis        Review of Systems  ROS completely negative except what was mentioned in the HPI.  Problem List, surgical, social, and family histories which were reviewed and updated as necessary within the EMR. I also personally reviewed the notes, labs, and imaging that pertained to what was documented or discussed in the HPI.      Objective   Physical Exam  Vitals and nursing note reviewed.   Constitutional:       General: She is not in acute distress.     Appearance: Normal appearance.   HENT:      Head: Normocephalic and atraumatic.      Right Ear: External ear normal.      Left Ear: External ear normal.      Nose: Nose normal.      Mouth/Throat:      Mouth: Mucous membranes are moist.   Eyes:      Extraocular Movements: Extraocular movements intact.      Conjunctiva/sclera: Conjunctivae normal.      Pupils: Pupils are equal, round, and reactive to light.   Cardiovascular:      Rate and Rhythm: Normal rate and regular rhythm.      Heart sounds: Normal heart sounds.   Pulmonary:      Effort: Pulmonary effort is normal.      Breath sounds: Normal breath sounds.   Abdominal:      General: Abdomen is flat. Bowel sounds are  "normal.      Palpations: Abdomen is soft.      Tenderness: There is no abdominal tenderness. There is no right CVA tenderness, left CVA tenderness or guarding.   Musculoskeletal:      Cervical back: Normal range of motion and neck supple.      Comments: Scoliosis, uses walker for ambulation   Lymphadenopathy:      Cervical: No cervical adenopathy.   Skin:     General: Skin is warm and dry.   Neurological:      General: No focal deficit present.      Mental Status: She is alert and oriented to person, place, and time. Mental status is at baseline.   Psychiatric:         Mood and Affect: Mood normal.         Behavior: Behavior normal.         Thought Content: Thought content normal.         Judgment: Judgment normal.         /70 (BP Location: Left arm)   Pulse 92   Temp 36.7 °C (98.1 °F) (Temporal)   Ht 1.702 m (5' 7\")   Wt 68 kg (150 lb)   SpO2 95%   BMI 23.49 kg/m²     Assessment/Plan   Problem List Items Addressed This Visit       Abdominal pain     D/t tizanidine?  Not listed as common AE  Hx of diverticulitis  Nonacute abdomen today, no pain today  Discussed to stop tizanadine, if pain resolves, can restart tomorrow  If returns, we will list it as allergy  If not, could be due to viral illness         Cervical disc disease    Flu-like symptoms - Primary     On day 5  Discussed viral vs bacterial  Will test for covid  Will see her next week for FU         Relevant Orders    Sars-CoV-2 PCR, Symptomatic    Lumbar disc disease     Will see medical spine  Medrol dose pack causes tachycardia  Tizanidine may be causing abdominal pain?         Osteoarthritis, knee     Having pain behind right knee x2 days  Clot unlikely, on eliquis, no redness, warmth, or swelling  Offered US, rule out baker cyst?  Pt declined at this time, will ice and rest         Thoracic disc disease     "

## 2023-06-23 NOTE — TELEPHONE ENCOUNTER
Called and spoke with the patient. Patient is scheduled with NP, however, she is not sure if she can find a ride. Patient to call if she cannot make the appointment. Would a virtual visit be acceptable?

## 2023-06-23 NOTE — ASSESSMENT & PLAN NOTE
Will see medical spine  Medrol dose pack causes tachycardia  Tizanidine may be causing abdominal pain?

## 2023-06-23 NOTE — PATIENT INSTRUCTIONS
No tizanidine tonight  Monitor belly pain  If resolves, can restart tizanidine  If pain returns, let me know, we will put tizanidine on your allergy list

## 2023-06-23 NOTE — TELEPHONE ENCOUNTER
sleep medicine Diagnostic Polysomnography Report faxed to:  Ash Burnett MD:  [phone]  And  Stevenson Durham MD: [phone]

## 2023-06-23 NOTE — ASSESSMENT & PLAN NOTE
D/t tizanidine?  Not listed as common AE  Hx of diverticulitis  Nonacute abdomen today, no pain today  Discussed to stop tizanadine, if pain resolves, can restart tomorrow  If returns, we will list it as allergy  If not, could be due to viral illness

## 2023-06-23 NOTE — ASSESSMENT & PLAN NOTE
Having pain behind right knee x2 days  Clot unlikely, on eliquis, no redness, warmth, or swelling  Offered US, rule out baker cyst?  Pt declined at this time, will ice and rest

## 2023-06-24 LAB — SARS-COV-2 RESULT: NOT DETECTED

## 2023-06-28 ENCOUNTER — OFFICE VISIT (OUTPATIENT)
Dept: PRIMARY CARE | Facility: CLINIC | Age: 78
End: 2023-06-28
Payer: MEDICARE

## 2023-06-28 VITALS
OXYGEN SATURATION: 96 % | HEIGHT: 67 IN | TEMPERATURE: 97.7 F | DIASTOLIC BLOOD PRESSURE: 70 MMHG | HEART RATE: 86 BPM | SYSTOLIC BLOOD PRESSURE: 128 MMHG | WEIGHT: 150 LBS | BODY MASS INDEX: 23.54 KG/M2

## 2023-06-28 DIAGNOSIS — M25.471 PAIN AND SWELLING OF RIGHT ANKLE: Primary | ICD-10-CM

## 2023-06-28 DIAGNOSIS — M51.9 THORACIC DISC DISEASE: ICD-10-CM

## 2023-06-28 DIAGNOSIS — K92.89 GAS BLOAT SYNDROME: ICD-10-CM

## 2023-06-28 DIAGNOSIS — M25.571 PAIN AND SWELLING OF RIGHT ANKLE: Primary | ICD-10-CM

## 2023-06-28 DIAGNOSIS — F41.9 ANXIETY: ICD-10-CM

## 2023-06-28 DIAGNOSIS — R68.89 FLU-LIKE SYMPTOMS: ICD-10-CM

## 2023-06-28 PROCEDURE — 1159F MED LIST DOCD IN RCRD: CPT | Performed by: NURSE PRACTITIONER

## 2023-06-28 PROCEDURE — 1036F TOBACCO NON-USER: CPT | Performed by: NURSE PRACTITIONER

## 2023-06-28 PROCEDURE — 1157F ADVNC CARE PLAN IN RCRD: CPT | Performed by: NURSE PRACTITIONER

## 2023-06-28 PROCEDURE — 99214 OFFICE O/P EST MOD 30 MIN: CPT | Performed by: NURSE PRACTITIONER

## 2023-06-28 RX ORDER — SIMETHICONE 80 MG
80 TABLET,CHEWABLE ORAL EVERY 6 HOURS PRN
Qty: 60 TABLET | Refills: 0 | Status: SHIPPED | OUTPATIENT
Start: 2023-06-28 | End: 2023-09-08 | Stop reason: WASHOUT

## 2023-06-28 NOTE — PROGRESS NOTES
Subjective   Patient ID: Adelia Munson is a 78 y.o. female who presents for Multiple issues.    Medrol dose pack  Decrease immunity?  Once had tachycardia on it  But was on other medications at same time    Had pain on R side back  But now more L  Spasm has improved  Now its just lingering joint pain    Had knee pain  Now ankle swollen  And knee pain resolved  Ankle pain has improved some    Viral illness  Is improving  Since starting robatussin    Gas/bloating  Lactose intolerant  Lactaid        Review of Systems  ROS completely negative except what was mentioned in the HPI.  Problem List, surgical, social, and family histories which were reviewed and updated as necessary within the EMR. I also personally reviewed the notes, labs, and imaging that pertained to what was documented or discussed in the HPI.      Objective   Physical Exam  Vitals and nursing note reviewed.   Constitutional:       General: She is not in acute distress.     Appearance: Normal appearance.   HENT:      Head: Normocephalic and atraumatic.      Right Ear: External ear normal.      Left Ear: External ear normal.      Nose: Nose normal.      Mouth/Throat:      Mouth: Mucous membranes are moist.   Eyes:      Extraocular Movements: Extraocular movements intact.      Conjunctiva/sclera: Conjunctivae normal.      Pupils: Pupils are equal, round, and reactive to light.   Cardiovascular:      Rate and Rhythm: Normal rate and regular rhythm.      Heart sounds: Normal heart sounds.   Pulmonary:      Effort: Pulmonary effort is normal.      Breath sounds: Normal breath sounds.   Musculoskeletal:      Cervical back: Normal range of motion and neck supple.      Comments: Uses rolling walker to assist in ambulation   Skin:     General: Skin is warm and dry.   Neurological:      General: No focal deficit present.      Mental Status: She is alert and oriented to person, place, and time. Mental status is at baseline.   Psychiatric:         Mood and Affect:  "Mood normal.         Behavior: Behavior normal.         Thought Content: Thought content normal.         Judgment: Judgment normal.         /70 (BP Location: Right arm)   Pulse 86   Temp 36.5 °C (97.7 °F) (Temporal)   Ht 1.702 m (5' 7\")   Wt 68 kg (150 lb)   SpO2 96%   BMI 23.49 kg/m²     Assessment/Plan   Problem List Items Addressed This Visit       Anxiety     Has not started lexapro  Once illness resolves, will begin         Flu-like symptoms     Improved, lingering cough  Discussed antibiotics vs symptomatic care  She will continue symptomatic care for now         Gas bloat syndrome     Trial simethicone         Relevant Medications    simethicone (Mylicon) 80 mg chewable tablet    Pain and swelling of right ankle - Primary     Pain has improved since swelling started  Seemed to start with back, then knee, now ankle  Will get XR  Discussed compression and elevation  Monitor for worsening in pain or swelling or redness         Relevant Orders    XR ankle right 3+ views (Completed)    Thoracic disc disease     Discussed risks and benefit to steroid  She will try, closely monitor HR          "

## 2023-06-28 NOTE — PATIENT INSTRUCTIONS
Tylenol max dosage is 4000 mg a day  It is ok to take 1-2 tablets once a day for a headache    Start medrol steroid pack, monitor for fast heart rate    Monitor redness of heel, will get XR today of ankle    Simethicone up to 4 x a day for gas and bloating  (gas-x, mylicon)    If virus is not improving, we can consider antibiotics, let me know  Keep robitussin for cough    Once all has resolved, start lexapro

## 2023-06-30 PROBLEM — M25.471 PAIN AND SWELLING OF RIGHT ANKLE: Status: ACTIVE | Noted: 2023-06-30

## 2023-06-30 PROBLEM — M25.571 PAIN AND SWELLING OF RIGHT ANKLE: Status: ACTIVE | Noted: 2023-06-30

## 2023-06-30 PROBLEM — K92.89 GAS BLOAT SYNDROME: Status: ACTIVE | Noted: 2023-06-30

## 2023-06-30 NOTE — ASSESSMENT & PLAN NOTE
Improved, lingering cough  Discussed antibiotics vs symptomatic care  She will continue symptomatic care for now

## 2023-06-30 NOTE — ASSESSMENT & PLAN NOTE
Pain has improved since swelling started  Seemed to start with back, then knee, now ankle  Will get XR  Discussed compression and elevation  Monitor for worsening in pain or swelling or redness

## 2023-07-14 ENCOUNTER — APPOINTMENT (OUTPATIENT)
Dept: PRIMARY CARE | Facility: CLINIC | Age: 78
End: 2023-07-14
Payer: MEDICARE

## 2023-07-20 ENCOUNTER — PATIENT OUTREACH (OUTPATIENT)
Dept: PRIMARY CARE | Facility: CLINIC | Age: 78
End: 2023-07-20
Payer: MEDICARE

## 2023-07-20 DIAGNOSIS — I48.92 ATRIAL FLUTTER, UNSPECIFIED TYPE (MULTI): ICD-10-CM

## 2023-07-20 DIAGNOSIS — N18.31 CHRONIC KIDNEY DISEASE, STAGE 3A (MULTI): ICD-10-CM

## 2023-07-20 PROCEDURE — 99490 CHRNC CARE MGMT STAFF 1ST 20: CPT | Performed by: INTERNAL MEDICINE

## 2023-07-20 NOTE — PROGRESS NOTES
Back pain is somewhat improving  Has reduced exercising  This week started back   Started walking in hallway again  Goes to PT tomorrow in [place] ([facility]) at 1:15pm    Bone spur on achilles tendon  Has gel heel inserts for shoes    Left elbow pain  Started about a week ago  Hx of tendinosis in L elbow in 1995  Had surgery to reattach the tendon    Has not started Lexapro yet  Hesitant about side effects  Discussed possibility of cutting pill in half  She explained the pills are very tiny  Suggested getting pill cutter at pharmacy  She will consider this  Discussed risks vs. benefits    August 19th  Plans to drive to [place] with 2 brothers  To visit sister (14 hour drive)

## 2023-07-21 ENCOUNTER — DOCUMENTATION (OUTPATIENT)
Dept: PRIMARY CARE | Facility: CLINIC | Age: 78
End: 2023-07-21
Payer: MEDICARE

## 2023-07-21 DIAGNOSIS — M25.522 LEFT ELBOW PAIN: ICD-10-CM

## 2023-07-24 ENCOUNTER — TELEPHONE (OUTPATIENT)
Dept: PRIMARY CARE | Facility: CLINIC | Age: 78
End: 2023-07-24
Payer: MEDICARE

## 2023-07-24 NOTE — TELEPHONE ENCOUNTER
Type of form: Plan of Care Certification  Received from: [facility] via fax for completion and provider's signature   Fax to [phone]  Form placed in PCP box front office.

## 2023-08-02 ENCOUNTER — OFFICE VISIT (OUTPATIENT)
Dept: PRIMARY CARE | Facility: CLINIC | Age: 78
End: 2023-08-02
Payer: MEDICARE

## 2023-08-02 VITALS
HEART RATE: 66 BPM | WEIGHT: 149 LBS | BODY MASS INDEX: 23.39 KG/M2 | TEMPERATURE: 97.7 F | HEIGHT: 67 IN | OXYGEN SATURATION: 96 % | DIASTOLIC BLOOD PRESSURE: 61 MMHG | SYSTOLIC BLOOD PRESSURE: 112 MMHG

## 2023-08-02 DIAGNOSIS — K92.89 GAS BLOAT SYNDROME: ICD-10-CM

## 2023-08-02 DIAGNOSIS — R19.4 BOWEL HABIT CHANGES: ICD-10-CM

## 2023-08-02 DIAGNOSIS — M77.8 LEFT ELBOW TENDINITIS: ICD-10-CM

## 2023-08-02 DIAGNOSIS — M25.571 PAIN AND SWELLING OF RIGHT ANKLE: ICD-10-CM

## 2023-08-02 DIAGNOSIS — M25.471 PAIN AND SWELLING OF RIGHT ANKLE: ICD-10-CM

## 2023-08-02 DIAGNOSIS — F41.9 ANXIETY: Primary | ICD-10-CM

## 2023-08-02 PROCEDURE — 99215 OFFICE O/P EST HI 40 MIN: CPT | Performed by: NURSE PRACTITIONER

## 2023-08-02 PROCEDURE — 1159F MED LIST DOCD IN RCRD: CPT | Performed by: NURSE PRACTITIONER

## 2023-08-02 PROCEDURE — 1036F TOBACCO NON-USER: CPT | Performed by: NURSE PRACTITIONER

## 2023-08-02 PROCEDURE — 1157F ADVNC CARE PLAN IN RCRD: CPT | Performed by: NURSE PRACTITIONER

## 2023-08-02 NOTE — ASSESSMENT & PLAN NOTE
Self d'c fish oil and probiotic  Not sure if that is helping her stomach  Discussed risks and benefits to stopping or continuing  Difficulty swallowing fish oil pill, discussed liquid  Did not try simethicone yet

## 2023-08-02 NOTE — PROGRESS NOTES
Subjective   Patient ID: Adeliaela Munson is a 78 y.o. female who presents for Follow-up (6 week follow up/Left elbow tendonitis/Discuss lexapro, fish oil and probitoic).    Started lexapro last week  Has been fatigued  Was hoping it would start working already  Didn't start simethicone  Wanted to see what happened with lexapro  Grinding teeth  Having issue with   Ankle pain  After rolling it  Improving  Left elbow pain  Does lean on that side  Left hand dominant  Not improving  Not worsening  No swelling  Certain movements make it sore  Stopped fish oil and probiotic  Some stomach issues improved, coincidence?  Having BM every morning  Use to be after breakfast  No incontinence        Review of Systems  ROS completely negative except what was mentioned in the HPI.  Problem List, surgical, social, and family histories which were reviewed and updated as necessary within the EMR. I also personally reviewed the notes, labs, and imaging that pertained to what was documented or discussed in the HPI.    Objective   Physical Exam  Vitals and nursing note reviewed.   Constitutional:       General: She is not in acute distress.     Appearance: Normal appearance.   HENT:      Head: Normocephalic and atraumatic.      Right Ear: External ear normal.      Left Ear: External ear normal.      Nose: Nose normal.      Mouth/Throat:      Mouth: Mucous membranes are moist.   Eyes:      Extraocular Movements: Extraocular movements intact.      Conjunctiva/sclera: Conjunctivae normal.      Pupils: Pupils are equal, round, and reactive to light.   Cardiovascular:      Rate and Rhythm: Normal rate and regular rhythm.      Heart sounds: Normal heart sounds.   Pulmonary:      Effort: Pulmonary effort is normal.      Breath sounds: Normal breath sounds.   Musculoskeletal:         General: Normal range of motion.      Cervical back: Normal range of motion and neck supple.   Skin:     General: Skin is warm and dry.   Neurological:       "General: No focal deficit present.      Mental Status: She is alert and oriented to person, place, and time. Mental status is at baseline.   Psychiatric:         Mood and Affect: Mood normal.         Behavior: Behavior normal.         Thought Content: Thought content normal.         Judgment: Judgment normal.         /61 (BP Location: Right arm)   Pulse 66   Temp 36.5 °C (97.7 °F) (Temporal)   Ht 1.702 m (5' 7\")   Wt 67.6 kg (149 lb)   SpO2 96%   BMI 23.34 kg/m²     Assessment/Plan    Problem List Items Addressed This Visit       Anxiety - Primary    Overview     Diazepam made anxiety worse  Not medicated  monitor         Current Assessment & Plan     On lexapro 5 mg for 1 week  Discussed to continue  Fu in 6 weeks         Gas bloat syndrome    Current Assessment & Plan     Self d'c fish oil and probiotic  Not sure if that is helping her stomach  Discussed risks and benefits to stopping or continuing  Difficulty swallowing fish oil pill, discussed liquid  Did not try simethicone yet         Pain and swelling of right ankle    Current Assessment & Plan     improving          Other Visit Diagnoses       Left elbow tendinitis        Bowel habit changes               "

## 2023-08-17 ENCOUNTER — PATIENT OUTREACH (OUTPATIENT)
Dept: PRIMARY CARE | Facility: CLINIC | Age: 78
End: 2023-08-17
Payer: MEDICARE

## 2023-08-17 DIAGNOSIS — N18.31 CHRONIC KIDNEY DISEASE, STAGE 3A (MULTI): ICD-10-CM

## 2023-08-17 DIAGNOSIS — F32.1 CURRENT MODERATE EPISODE OF MAJOR DEPRESSIVE DISORDER WITHOUT PRIOR EPISODE (MULTI): ICD-10-CM

## 2023-08-17 PROCEDURE — 99490 CHRNC CARE MGMT STAFF 1ST 20: CPT | Performed by: INTERNAL MEDICINE

## 2023-08-17 NOTE — PROGRESS NOTES
L elbow tendonitis  Rest, ice/heat  Mentioned it to PT   Got some exercises for it which she is doing  Does have small band that she wears    Notices she gets chills  Notices it after she eats  Wearing flannel nightgown to bed  Not followed by sweating  Happening before she started lexapro    Taking lexapro x3 weeks  Seems to be helping at night  Not clenching her teeth as much  Does feel more tired during the day  Taking daily at 5pm   Thinks he needs to stop watching the news  As this is stressful    Going with her brothers to visit sister in [place]  Talks to sister every week on the phone  Back to walking- in carpeted hallways in building  Signed up at [facility]  Has not gone often- but they have track and bikes she would use

## 2023-09-08 DIAGNOSIS — M25.522 LEFT ELBOW PAIN: Primary | ICD-10-CM

## 2023-09-13 ENCOUNTER — OFFICE VISIT (OUTPATIENT)
Dept: PRIMARY CARE | Facility: CLINIC | Age: 78
End: 2023-09-13
Payer: MEDICARE

## 2023-09-13 VITALS
WEIGHT: 150 LBS | SYSTOLIC BLOOD PRESSURE: 107 MMHG | TEMPERATURE: 98.3 F | DIASTOLIC BLOOD PRESSURE: 68 MMHG | OXYGEN SATURATION: 98 % | HEART RATE: 59 BPM | HEIGHT: 67 IN | BODY MASS INDEX: 23.54 KG/M2

## 2023-09-13 DIAGNOSIS — M79.2 NEUROPATHIC PAIN OF LEFT LOWER EXTREMITY: Primary | ICD-10-CM

## 2023-09-13 DIAGNOSIS — F41.9 ANXIETY: ICD-10-CM

## 2023-09-13 PROCEDURE — 1036F TOBACCO NON-USER: CPT | Performed by: NURSE PRACTITIONER

## 2023-09-13 PROCEDURE — 1157F ADVNC CARE PLAN IN RCRD: CPT | Performed by: NURSE PRACTITIONER

## 2023-09-13 PROCEDURE — 99214 OFFICE O/P EST MOD 30 MIN: CPT | Performed by: NURSE PRACTITIONER

## 2023-09-13 PROCEDURE — 1159F MED LIST DOCD IN RCRD: CPT | Performed by: NURSE PRACTITIONER

## 2023-09-13 RX ORDER — ESCITALOPRAM OXALATE 10 MG/1
10 TABLET ORAL DAILY
Qty: 90 TABLET | Refills: 3 | Status: SHIPPED | OUTPATIENT
Start: 2023-09-13 | End: 2023-11-02 | Stop reason: SDUPTHER

## 2023-09-13 ASSESSMENT — ANXIETY QUESTIONNAIRES
7. FEELING AFRAID AS IF SOMETHING AWFUL MIGHT HAPPEN: SEVERAL DAYS
IF YOU CHECKED OFF ANY PROBLEMS ON THIS QUESTIONNAIRE, HOW DIFFICULT HAVE THESE PROBLEMS MADE IT FOR YOU TO DO YOUR WORK, TAKE CARE OF THINGS AT HOME, OR GET ALONG WITH OTHER PEOPLE: SOMEWHAT DIFFICULT
3. WORRYING TOO MUCH ABOUT DIFFERENT THINGS: MORE THAN HALF THE DAYS
2. NOT BEING ABLE TO STOP OR CONTROL WORRYING: MORE THAN HALF THE DAYS
GAD7 TOTAL SCORE: 10
6. BECOMING EASILY ANNOYED OR IRRITABLE: MORE THAN HALF THE DAYS
1. FEELING NERVOUS, ANXIOUS, OR ON EDGE: MORE THAN HALF THE DAYS
4. TROUBLE RELAXING: SEVERAL DAYS
5. BEING SO RESTLESS THAT IT IS HARD TO SIT STILL: NOT AT ALL

## 2023-09-13 NOTE — PATIENT INSTRUCTIONS
Increase lexapro to 10 mgs daily  Alternate between 5mg and 10 mg every other day for 1-2 weeks, then start 10 mg daily    Alf Boland  [phone]    Flonase for post nasal drip cough

## 2023-09-13 NOTE — PROGRESS NOTES
Subjective   Patient ID: Adeliaela Munson is a 78 y.o. female who presents for Follow-up (Coughing at night x 2 months/Uses throat lozenges to help/Denies cough at night/Received RSV vaccine on 9/8/23/Doing PT for left elbow).    Lexapro  Not sure if it has helped with anxiety  Puts a lot of pressure on herself  To get things done    Took simethicone  Helps minimally  Feels like its what she eats    Seeing PT  For elbow and back  Back is doing well  Stopped using grabbers  Able to bend at waist again  Exercises for hand and arm  Maybe helping    Lower left leg  Pain at night  Worse if lay on either side  Ongoing for 4 years  Worse if wears tight pants        Review of Systems  ROS completely negative except what was mentioned in the HPI.  Problem List, surgical, social, and family histories which were reviewed and updated as necessary within the EMR. I also personally reviewed the notes, labs, and imaging that pertained to what was documented or discussed in the HPI.      Objective   Physical Exam  Vitals and nursing note reviewed.   Constitutional:       General: She is not in acute distress.     Appearance: Normal appearance.   HENT:      Head: Normocephalic and atraumatic.      Right Ear: External ear normal.      Left Ear: External ear normal.      Nose: Nose normal.      Mouth/Throat:      Mouth: Mucous membranes are moist.   Eyes:      Extraocular Movements: Extraocular movements intact.      Conjunctiva/sclera: Conjunctivae normal.      Pupils: Pupils are equal, round, and reactive to light.   Cardiovascular:      Rate and Rhythm: Normal rate and regular rhythm.      Heart sounds: Normal heart sounds.   Pulmonary:      Effort: Pulmonary effort is normal.      Breath sounds: Normal breath sounds.   Musculoskeletal:         General: Normal range of motion.      Cervical back: Normal range of motion and neck supple.   Skin:     General: Skin is warm and dry.   Neurological:      General: No focal deficit present.  "     Mental Status: She is alert and oriented to person, place, and time. Mental status is at baseline.   Psychiatric:         Mood and Affect: Mood normal.         Behavior: Behavior normal.         Thought Content: Thought content normal.         Judgment: Judgment normal.         /68 (BP Location: Right arm)   Pulse 59   Temp 36.8 °C (98.3 °F) (Oral)   Ht 1.702 m (5' 7\")   Wt 68 kg (150 lb)   SpO2 98%   BMI 23.49 kg/m²     Assessment/Plan    Problem List Items Addressed This Visit       Anxiety    Overview     Diazepam made anxiety worse  On lexapro  monitor         Current Assessment & Plan     MADDIE = 10  She is agreeable to slowly increase Lexapro to 10mg  Has appt with PCP in 7 weeks, will reevaluate then, if not sooner         Relevant Medications    escitalopram (Lexapro) 10 mg tablet    Neuropathic pain of left lower extremity - Primary    Overview     NSAID ineffective  Gabapentin caused GI side effects  MRI Tib/Fib 1/2020: Benign appearing area in the proximal tibial metaphysis, without suspicious features, likely post-surgical defect         Current Assessment & Plan     Worse after prolonged walking when on vacation & wearing tight pants  Also discussed proper footwear  She was told she has one leg longer than the other  Discussed orthotics and Dr Gloria.            "

## 2023-09-14 ENCOUNTER — PATIENT OUTREACH (OUTPATIENT)
Dept: PRIMARY CARE | Facility: CLINIC | Age: 78
End: 2023-09-14
Payer: MEDICARE

## 2023-09-14 DIAGNOSIS — F32.1 CURRENT MODERATE EPISODE OF MAJOR DEPRESSIVE DISORDER WITHOUT PRIOR EPISODE (MULTI): ICD-10-CM

## 2023-09-14 DIAGNOSIS — I48.92 ATRIAL FLUTTER, UNSPECIFIED TYPE (MULTI): ICD-10-CM

## 2023-09-14 DIAGNOSIS — N18.31 CHRONIC KIDNEY DISEASE, STAGE 3A (MULTI): ICD-10-CM

## 2023-09-14 PROCEDURE — 99490 CHRNC CARE MGMT STAFF 1ST 20: CPT | Performed by: INTERNAL MEDICINE

## 2023-09-14 NOTE — ASSESSMENT & PLAN NOTE
MADDIE = 10  She is agreeable to slowly increase Lexapro to 10mg  Has appt with PCP in 7 weeks, will reevaluate then, if not sooner

## 2023-09-14 NOTE — PROGRESS NOTES
Left knee/Left hip bothering her  Knee was swollen and painful yesterday  Better this morning  After Ice/tylenol    Has done Lexapro 5mg x6 weeks  Suggested she increase to 10mg  Feels more daytime tiredness since starting 5mg  Open to increasing dose    6-8 weeks  Waking up in the middle of the night  Coughing episodes- dry cough  Nose becomes runny/eye water  Will try Flomeghanae    Dr. Lindsay ordered her an EGD  Was going to have one last summer  Has had to reschedule several times    Got RSV vaccine  No side effects noted  Will do Flu vaccine in another week

## 2023-09-14 NOTE — ASSESSMENT & PLAN NOTE
Worse after prolonged walking when on vacation & wearing tight pants  Also discussed proper footwear  She was told she has one leg longer than the other  Discussed orthotics and Dr Gloria.

## 2023-10-11 ENCOUNTER — TELEPHONE (OUTPATIENT)
Dept: PRIMARY CARE | Facility: CLINIC | Age: 78
End: 2023-10-11
Payer: MEDICARE

## 2023-10-11 NOTE — TELEPHONE ENCOUNTER
Patient calling today concerned about whether or  not she should stop her eloquis before her blood work ordered by Dr Tim for her AWV, advised patient to continue taking scheduled meds as usual.

## 2023-10-16 ENCOUNTER — LAB (OUTPATIENT)
Dept: LAB | Facility: LAB | Age: 78
End: 2023-10-16
Payer: MEDICARE

## 2023-10-16 DIAGNOSIS — I48.0 PAROXYSMAL ATRIAL FIBRILLATION (MULTI): ICD-10-CM

## 2023-10-16 DIAGNOSIS — K59.00 CONSTIPATION, UNSPECIFIED CONSTIPATION TYPE: ICD-10-CM

## 2023-10-16 DIAGNOSIS — I47.10 PAROXYSMAL SUPRAVENTRICULAR TACHYCARDIA (CMS-HCC): ICD-10-CM

## 2023-10-16 DIAGNOSIS — E53.8 VITAMIN B12 DEFICIENCY: ICD-10-CM

## 2023-10-16 DIAGNOSIS — M06.9 RHEUMATOID ARTHRITIS, INVOLVING UNSPECIFIED SITE, UNSPECIFIED WHETHER RHEUMATOID FACTOR PRESENT (MULTI): ICD-10-CM

## 2023-10-16 DIAGNOSIS — I48.92 ATRIAL FLUTTER, UNSPECIFIED TYPE (MULTI): ICD-10-CM

## 2023-10-16 DIAGNOSIS — N18.31 CHRONIC KIDNEY DISEASE, STAGE 3A (MULTI): ICD-10-CM

## 2023-10-16 DIAGNOSIS — E55.9 VITAMIN D DEFICIENCY: ICD-10-CM

## 2023-10-16 DIAGNOSIS — N18.9 CHRONIC KIDNEY DISEASE, UNSPECIFIED CKD STAGE: ICD-10-CM

## 2023-10-16 DIAGNOSIS — I48.20 CHRONIC ATRIAL FIBRILLATION, UNSPECIFIED (MULTI): ICD-10-CM

## 2023-10-16 LAB
25(OH)D3 SERPL-MCNC: 49 NG/ML (ref 30–100)
ALBUMIN SERPL BCP-MCNC: 4.1 G/DL (ref 3.4–5)
ALP SERPL-CCNC: 23 U/L (ref 33–136)
ALT SERPL W P-5'-P-CCNC: 12 U/L (ref 7–45)
ANION GAP SERPL CALC-SCNC: 11 MMOL/L (ref 10–20)
APPEARANCE UR: CLEAR
AST SERPL W P-5'-P-CCNC: 17 U/L (ref 9–39)
BACTERIA #/AREA URNS AUTO: ABNORMAL /HPF
BASOPHILS # BLD AUTO: 0.03 X10*3/UL (ref 0–0.1)
BASOPHILS NFR BLD AUTO: 0.7 %
BILIRUB SERPL-MCNC: 0.6 MG/DL (ref 0–1.2)
BILIRUB UR STRIP.AUTO-MCNC: NEGATIVE MG/DL
BUN SERPL-MCNC: 15 MG/DL (ref 6–23)
CALCIUM SERPL-MCNC: 9.7 MG/DL (ref 8.6–10.3)
CHLORIDE SERPL-SCNC: 102 MMOL/L (ref 98–107)
CHOLEST SERPL-MCNC: 183 MG/DL (ref 0–199)
CHOLESTEROL/HDL RATIO: 2.8
CO2 SERPL-SCNC: 31 MMOL/L (ref 21–32)
COLOR UR: YELLOW
CREAT SERPL-MCNC: 0.92 MG/DL (ref 0.5–1.05)
EOSINOPHIL # BLD AUTO: 0.04 X10*3/UL (ref 0–0.4)
EOSINOPHIL NFR BLD AUTO: 0.9 %
ERYTHROCYTE [DISTWIDTH] IN BLOOD BY AUTOMATED COUNT: 13.2 % (ref 11.5–14.5)
GFR SERPL CREATININE-BSD FRML MDRD: 64 ML/MIN/1.73M*2
GLUCOSE SERPL-MCNC: 77 MG/DL (ref 74–99)
GLUCOSE UR STRIP.AUTO-MCNC: NEGATIVE MG/DL
HCT VFR BLD AUTO: 39.4 % (ref 36–46)
HDLC SERPL-MCNC: 64.6 MG/DL
HGB BLD-MCNC: 12.9 G/DL (ref 12–16)
IMM GRANULOCYTES # BLD AUTO: 0.02 X10*3/UL (ref 0–0.5)
IMM GRANULOCYTES NFR BLD AUTO: 0.4 % (ref 0–0.9)
KETONES UR STRIP.AUTO-MCNC: NEGATIVE MG/DL
LDLC SERPL CALC-MCNC: 101 MG/DL
LEUKOCYTE ESTERASE UR QL STRIP.AUTO: NEGATIVE
LYMPHOCYTES # BLD AUTO: 0.92 X10*3/UL (ref 0.8–3)
LYMPHOCYTES NFR BLD AUTO: 20.3 %
MCH RBC QN AUTO: 30.1 PG (ref 26–34)
MCHC RBC AUTO-ENTMCNC: 32.7 G/DL (ref 32–36)
MCV RBC AUTO: 92 FL (ref 80–100)
MONOCYTES # BLD AUTO: 0.47 X10*3/UL (ref 0.05–0.8)
MONOCYTES NFR BLD AUTO: 10.4 %
MUCOUS THREADS #/AREA URNS AUTO: ABNORMAL /LPF
NEUTROPHILS # BLD AUTO: 3.05 X10*3/UL (ref 1.6–5.5)
NEUTROPHILS NFR BLD AUTO: 67.3 %
NITRITE UR QL STRIP.AUTO: NEGATIVE
NON HDL CHOLESTEROL: 118 MG/DL (ref 0–149)
NRBC BLD-RTO: 0 /100 WBCS (ref 0–0)
PH UR STRIP.AUTO: 6 [PH]
PLATELET # BLD AUTO: 193 X10*3/UL (ref 150–450)
PMV BLD AUTO: 10.4 FL (ref 7.5–11.5)
POTASSIUM SERPL-SCNC: 4.3 MMOL/L (ref 3.5–5.3)
PROT SERPL-MCNC: 6.4 G/DL (ref 6.4–8.2)
PROT UR STRIP.AUTO-MCNC: NEGATIVE MG/DL
RBC # BLD AUTO: 4.28 X10*6/UL (ref 4–5.2)
RBC # UR STRIP.AUTO: ABNORMAL /UL
RBC #/AREA URNS AUTO: ABNORMAL /HPF
SODIUM SERPL-SCNC: 140 MMOL/L (ref 136–145)
SP GR UR STRIP.AUTO: 1.01
SQUAMOUS #/AREA URNS AUTO: ABNORMAL /HPF
TRIGL SERPL-MCNC: 88 MG/DL (ref 0–149)
TSH SERPL-ACNC: 1.21 MIU/L (ref 0.44–3.98)
UROBILINOGEN UR STRIP.AUTO-MCNC: <2 MG/DL
VIT B12 SERPL-MCNC: 515 PG/ML (ref 211–911)
VLDL: 18 MG/DL (ref 0–40)
WBC # BLD AUTO: 4.5 X10*3/UL (ref 4.4–11.3)
WBC #/AREA URNS AUTO: ABNORMAL /HPF

## 2023-10-16 PROCEDURE — 80061 LIPID PANEL: CPT

## 2023-10-16 PROCEDURE — 84443 ASSAY THYROID STIM HORMONE: CPT

## 2023-10-16 PROCEDURE — 82306 VITAMIN D 25 HYDROXY: CPT

## 2023-10-16 PROCEDURE — 85025 COMPLETE CBC W/AUTO DIFF WBC: CPT

## 2023-10-16 PROCEDURE — 82607 VITAMIN B-12: CPT

## 2023-10-16 PROCEDURE — 81001 URINALYSIS AUTO W/SCOPE: CPT

## 2023-10-16 PROCEDURE — 80053 COMPREHEN METABOLIC PANEL: CPT

## 2023-10-16 PROCEDURE — 36415 COLL VENOUS BLD VENIPUNCTURE: CPT

## 2023-10-20 ENCOUNTER — PATIENT OUTREACH (OUTPATIENT)
Dept: PRIMARY CARE | Facility: CLINIC | Age: 78
End: 2023-10-20
Payer: MEDICARE

## 2023-10-20 DIAGNOSIS — N18.31 CHRONIC KIDNEY DISEASE, STAGE 3A (MULTI): ICD-10-CM

## 2023-10-20 DIAGNOSIS — F32.1 CURRENT MODERATE EPISODE OF MAJOR DEPRESSIVE DISORDER WITHOUT PRIOR EPISODE (MULTI): ICD-10-CM

## 2023-10-20 DIAGNOSIS — I48.92 ATRIAL FLUTTER, UNSPECIFIED TYPE (MULTI): ICD-10-CM

## 2023-10-20 PROCEDURE — 99490 CHRNC CARE MGMT STAFF 1ST 20: CPT | Performed by: INTERNAL MEDICINE

## 2023-10-20 NOTE — PROGRESS NOTES
Finished PT and feels overall better with the   Left elbow continues to be painful  20 years ago had surgery on the elbow to   Treat tendinosis with Dr. Nina Joya  PT thought improvement may take many months  Recommended rest, heat, ice and specific exercises    Nov 2nd MWV with Dr. Tim scheduled  She is scheduled with Dr. Lindsay for endoscopy and colonoscopy  Annual eye check today    COVID vaccine-10/16/23    Spending time with friends  Volunteering at senior center in [place]  Walking in hallways    10/1 increased her Lexapro to 10mg daily  She is feeling better  Feels more energy  Sleeping better- only waking 3 times at night to use bathroom

## 2023-10-29 DIAGNOSIS — K21.9 CHRONIC GERD: Primary | ICD-10-CM

## 2023-10-30 RX ORDER — HYDROGEN PEROXIDE 3 %
20 SOLUTION, NON-ORAL MISCELLANEOUS DAILY
Qty: 90 CAPSULE | Refills: 0 | Status: SHIPPED | OUTPATIENT
Start: 2023-10-30 | End: 2024-05-06 | Stop reason: SDUPTHER

## 2023-11-02 ENCOUNTER — OFFICE VISIT (OUTPATIENT)
Dept: PRIMARY CARE | Facility: CLINIC | Age: 78
End: 2023-11-02
Payer: MEDICARE

## 2023-11-02 VITALS
TEMPERATURE: 97.8 F | WEIGHT: 148.38 LBS | HEART RATE: 61 BPM | HEIGHT: 66 IN | BODY MASS INDEX: 23.84 KG/M2 | DIASTOLIC BLOOD PRESSURE: 68 MMHG | SYSTOLIC BLOOD PRESSURE: 117 MMHG

## 2023-11-02 DIAGNOSIS — E55.9 VITAMIN D DEFICIENCY, UNSPECIFIED: ICD-10-CM

## 2023-11-02 DIAGNOSIS — N18.2 CHRONIC KIDNEY DISEASE, STAGE 2 (MILD): ICD-10-CM

## 2023-11-02 DIAGNOSIS — Z87.19 H/O ESOPHAGITIS: ICD-10-CM

## 2023-11-02 DIAGNOSIS — G47.33 OBSTRUCTIVE SLEEP APNEA: ICD-10-CM

## 2023-11-02 DIAGNOSIS — Z71.89 ADVANCED DIRECTIVES, COUNSELING/DISCUSSION: ICD-10-CM

## 2023-11-02 DIAGNOSIS — I47.10 PAROXYSMAL SUPRAVENTRICULAR TACHYCARDIA (CMS-HCC): ICD-10-CM

## 2023-11-02 DIAGNOSIS — F41.9 ANXIETY DISORDER, UNSPECIFIED: ICD-10-CM

## 2023-11-02 DIAGNOSIS — E53.8 DEFICIENCY OF OTHER SPECIFIED B GROUP VITAMINS: ICD-10-CM

## 2023-11-02 DIAGNOSIS — I48.0 PAROXYSMAL ATRIAL FIBRILLATION (MULTI): ICD-10-CM

## 2023-11-02 DIAGNOSIS — I47.10 SUPRAVENTRICULAR TACHYCARDIA, UNSPECIFIED (CMS-HCC): ICD-10-CM

## 2023-11-02 DIAGNOSIS — G47.33 OBSTRUCTIVE SLEEP APNEA (ADULT) (PEDIATRIC): ICD-10-CM

## 2023-11-02 DIAGNOSIS — R15.2 FECAL URGENCY: ICD-10-CM

## 2023-11-02 DIAGNOSIS — R51.9 HEADACHE, UNSPECIFIED: ICD-10-CM

## 2023-11-02 DIAGNOSIS — N18.2 CKD (CHRONIC KIDNEY DISEASE) STAGE 2, GFR 60-89 ML/MIN: ICD-10-CM

## 2023-11-02 DIAGNOSIS — Z71.89 CARDIAC RISK COUNSELING: ICD-10-CM

## 2023-11-02 DIAGNOSIS — F32.1 CURRENT MODERATE EPISODE OF MAJOR DEPRESSIVE DISORDER WITHOUT PRIOR EPISODE (MULTI): ICD-10-CM

## 2023-11-02 DIAGNOSIS — Z12.31 ENCOUNTER FOR SCREENING MAMMOGRAM FOR MALIGNANT NEOPLASM OF BREAST: ICD-10-CM

## 2023-11-02 DIAGNOSIS — Z87.19 PERSONAL HISTORY OF OTHER DISEASES OF THE DIGESTIVE SYSTEM: ICD-10-CM

## 2023-11-02 DIAGNOSIS — Z78.0 MENOPAUSE: ICD-10-CM

## 2023-11-02 DIAGNOSIS — Z71.89 OTHER SPECIFIED COUNSELING: ICD-10-CM

## 2023-11-02 DIAGNOSIS — R51.9 CHRONIC NONINTRACTABLE HEADACHE, UNSPECIFIED HEADACHE TYPE: ICD-10-CM

## 2023-11-02 DIAGNOSIS — E53.8 VITAMIN B12 DEFICIENCY: ICD-10-CM

## 2023-11-02 DIAGNOSIS — R05.3 CHRONIC COUGH: ICD-10-CM

## 2023-11-02 DIAGNOSIS — Z13.89 ENCOUNTER FOR SCREENING FOR OTHER DISORDER: ICD-10-CM

## 2023-11-02 DIAGNOSIS — Z13.89 SCREENING FOR MULTIPLE CONDITIONS: ICD-10-CM

## 2023-11-02 DIAGNOSIS — F41.9 ANXIETY: ICD-10-CM

## 2023-11-02 DIAGNOSIS — G89.29 CHRONIC NONINTRACTABLE HEADACHE, UNSPECIFIED HEADACHE TYPE: ICD-10-CM

## 2023-11-02 DIAGNOSIS — G89.29 OTHER CHRONIC PAIN: ICD-10-CM

## 2023-11-02 DIAGNOSIS — Z00.00 ENCOUNTER FOR GENERAL ADULT MEDICAL EXAMINATION WITHOUT ABNORMAL FINDINGS: ICD-10-CM

## 2023-11-02 DIAGNOSIS — Z12.31 ENCOUNTER FOR SCREENING MAMMOGRAM FOR BREAST CANCER: ICD-10-CM

## 2023-11-02 DIAGNOSIS — G25.0 ESSENTIAL TREMOR: ICD-10-CM

## 2023-11-02 DIAGNOSIS — J43.8 OTHER EMPHYSEMA (MULTI): ICD-10-CM

## 2023-11-02 DIAGNOSIS — F32.1 MAJOR DEPRESSIVE DISORDER, SINGLE EPISODE, MODERATE (MULTI): ICD-10-CM

## 2023-11-02 DIAGNOSIS — Z78.0 ASYMPTOMATIC MENOPAUSAL STATE: ICD-10-CM

## 2023-11-02 DIAGNOSIS — Z00.00 ROUTINE ADULT HEALTH MAINTENANCE: Primary | Chronic | ICD-10-CM

## 2023-11-02 DIAGNOSIS — E55.9 VITAMIN D DEFICIENCY: ICD-10-CM

## 2023-11-02 PROBLEM — M25.471 PAIN AND SWELLING OF RIGHT ANKLE: Status: RESOLVED | Noted: 2023-06-30 | Resolved: 2023-11-02

## 2023-11-02 PROBLEM — M06.9 RHEUMATOID ARTHRITIS, INVOLVING UNSPECIFIED SITE, UNSPECIFIED WHETHER RHEUMATOID FACTOR PRESENT (MULTI): Status: RESOLVED | Noted: 2023-03-24 | Resolved: 2023-11-02

## 2023-11-02 PROBLEM — D64.9 ANEMIA, UNSPECIFIED: Status: RESOLVED | Noted: 2023-02-05 | Resolved: 2023-11-02

## 2023-11-02 PROBLEM — R10.9 ABDOMINAL PAIN: Status: RESOLVED | Noted: 2023-06-23 | Resolved: 2023-11-02

## 2023-11-02 PROBLEM — K59.00 CONSTIPATION: Status: RESOLVED | Noted: 2023-02-05 | Resolved: 2023-11-02

## 2023-11-02 PROBLEM — M25.571 PAIN AND SWELLING OF RIGHT ANKLE: Status: RESOLVED | Noted: 2023-06-30 | Resolved: 2023-11-02

## 2023-11-02 PROBLEM — Z86.018 HISTORY OF BENIGN BLADDER TUMOR: Status: RESOLVED | Noted: 2023-02-05 | Resolved: 2023-11-02

## 2023-11-02 PROBLEM — R68.89 FLU-LIKE SYMPTOMS: Status: RESOLVED | Noted: 2023-06-23 | Resolved: 2023-11-02

## 2023-11-02 PROCEDURE — 1159F MED LIST DOCD IN RCRD: CPT | Performed by: INTERNAL MEDICINE

## 2023-11-02 PROCEDURE — 99215 OFFICE O/P EST HI 40 MIN: CPT | Performed by: INTERNAL MEDICINE

## 2023-11-02 PROCEDURE — G0446 INTENS BEHAVE THER CARDIO DX: HCPCS | Performed by: INTERNAL MEDICINE

## 2023-11-02 PROCEDURE — 1036F TOBACCO NON-USER: CPT | Performed by: INTERNAL MEDICINE

## 2023-11-02 PROCEDURE — 99497 ADVNCD CARE PLAN 30 MIN: CPT | Performed by: INTERNAL MEDICINE

## 2023-11-02 PROCEDURE — G0439 PPPS, SUBSEQ VISIT: HCPCS | Performed by: INTERNAL MEDICINE

## 2023-11-02 PROCEDURE — 99397 PER PM REEVAL EST PAT 65+ YR: CPT | Performed by: INTERNAL MEDICINE

## 2023-11-02 PROCEDURE — G0444 DEPRESSION SCREEN ANNUAL: HCPCS | Performed by: INTERNAL MEDICINE

## 2023-11-02 PROCEDURE — 1160F RVW MEDS BY RX/DR IN RCRD: CPT | Performed by: INTERNAL MEDICINE

## 2023-11-02 RX ORDER — BISACODYL 5 MG/1
1 TABLET, COATED ORAL DAILY
COMMUNITY

## 2023-11-02 RX ORDER — ESCITALOPRAM OXALATE 10 MG/1
15 TABLET ORAL DAILY
Qty: 135 TABLET | Refills: 3
Start: 2023-11-02 | End: 2023-11-28 | Stop reason: SDUPTHER

## 2023-11-02 RX ORDER — CHOLECALCIFEROL (VITAMIN D3) 50 MCG
1 CAPSULE ORAL DAILY
COMMUNITY
Start: 2023-10-01 | End: 2024-02-22 | Stop reason: ALTCHOICE

## 2023-11-02 ASSESSMENT — PATIENT HEALTH QUESTIONNAIRE - PHQ9
1. LITTLE INTEREST OR PLEASURE IN DOING THINGS: NOT AT ALL
2. FEELING DOWN, DEPRESSED OR HOPELESS: NOT AT ALL
SUM OF ALL RESPONSES TO PHQ9 QUESTIONS 1 AND 2: 0

## 2023-11-02 NOTE — ASSESSMENT & PLAN NOTE
Annual Wellness exam completed   Preventive Health History reviewed  Ordered:   Labs    Mammogram   BMD   Cscope   Plan for PCV 20 in 2025

## 2023-11-02 NOTE — PROGRESS NOTES
Medicare Wellness Exam/Comprehensive Problem Focused Follow Up and Physical Exam  Chief Complaint- Multiple issues also   Patient presents with   CC/HPI Medicare Annual Wellness Visit Subsequent     Cough since the beginning of August - progressively gotten worse but it is off and on  Feels like top of throat  Tongue can reach it  Top of mouth is rough in the back  Nose and eyes water on that same side of the body depending on where the cough stems from   On PPI  Denies acid sx    Bowel movement issues - squirts almost like pellets; urge to go but just spurts and a large BM in early morning. Not food related  Has BM when gets up and before breakfast  Gets urges to go  Didnt get either upper endoscopy and colonoscopy  Sx started 1 year  This started before Lexapro  Sx started after nexium, eliquis and Metoprolol  Probiotic didn't help  Doesn't take fiber supplements    Headaches- around the forehead and/or right eye  Sx started 2months ago  Felt like behind the eye and now is across forehead  Opthalmology said she has cataracts - surgery soon  Exam was done  Does get sinus drainage  CT brain 11/21 essentially normal, scattered dural ossifications most prominent along the left  frontal region; no associated soft tissue masses suggestive of  meningiomas     Essential tremor in head and left hand tremor  Gabapentin 100 mg plus primidone 50 mg - friends spouse take this and makes a difference  She couldn't tolerate Gabapentin, GI side effects  Per UTD: Primidone would cause eliquis to be less effective    Finished PT last week  - lower back     Lexapro increased in 9/23, helped    Did sleep study : Mild SHIMA(obstructive sleep apnea), O2  (lowest it went) 90%, AHI 8.4. (apnea index-how they measure severity and this is mild range)  AutoPAP [id number] recommend  Does not want to do CPAP    Saw Urology in June (Copied):  IMPRESSION:  -Nocturia/Nocturnal polyuria-We reviewed treatment modalities for nocturnal polyuria     Lifestyle modifications such as,  Minimize caffeine and bladder irritants. , Minimize fluid intake four hours before bedtime., and If any risk factors for obstructive sleep apnea, consider referral to sleep medicine.   Medical therapy such as,  Timing a low dose diuretic in the afternoon to complete diuresis prior to bedtime.  At this point, the patient would like to pursue will get sleep study Monday   let me know if we need to rx Lasix 10mg a day at around 5/6 pm  sleep study Monday and if apnea, consider CPAP and this may help her nocturia  diary sent for scanning  shows about 30% noc polyuria volume    Saw Cardiology in May (Copied)  Impressions   1) atrial flutter: No symptomatic recurrence. Remains on metoprolol and Eliquis.   2) follow-up: 12 months or sooner if needed      Saw Pulm in April (copied):  Patient Discussion/Summary   Abnormal PFTs; no obstruction, air trapping and reduced DLCO.   CXR in 9/2022 shows emphysematous changes.   CT/PE was normal. Normal echo, Follows cardiology.   Normal Hgb 13.5 in 11/2022   - testing for Alpha 1 antitrypsin deficiency d/t emphysema on CXR and never smoker  Follow up as needed.  Will call with A1AT results     Labs reviewed:  Component      Latest Ref Rng 10/16/2023   WBC      4.4 - 11.3 x10*3/uL 4.5    nRBC      0.0 - 0.0 /100 WBCs 0.0    RBC      4.00 - 5.20 x10*6/uL 4.28    HEMOGLOBIN      12.0 - 16.0 g/dL 12.9    HEMATOCRIT      36.0 - 46.0 % 39.4    MCV      80 - 100 fL 92    MCH      26.0 - 34.0 pg 30.1    MCHC      32.0 - 36.0 g/dL 32.7    RED CELL DISTRIBUTION WIDTH      11.5 - 14.5 % 13.2    Platelets      150 - 450 x10*3/uL 193    MEAN PLATELET VOLUME      7.5 - 11.5 fL 10.4    Neutrophils %      40.0 - 80.0 % 67.3    Immature Granulocytes %, Automated      0.0 - 0.9 % 0.4    Lymphocytes %      13.0 - 44.0 % 20.3    Monocytes %      2.0 - 10.0 % 10.4    Eosinophils %      0.0 - 6.0 % 0.9    Basophils %      0.0 - 2.0 % 0.7    Neutrophils Absolute       1.60 - 5.50 x10*3/uL 3.05    Immature Granulocytes Absolute, Automated      0.00 - 0.50 x10*3/uL 0.02    Lymphocytes Absolute      0.80 - 3.00 x10*3/uL 0.92    Monocytes Absolute      0.05 - 0.80 x10*3/uL 0.47    Eosinophils Absolute      0.00 - 0.40 x10*3/uL 0.04    Basophils Absolute      0.00 - 0.10 x10*3/uL 0.03    GLUCOSE      74 - 99 mg/dL 77    SODIUM      136 - 145 mmol/L 140    POTASSIUM      3.5 - 5.3 mmol/L 4.3    CHLORIDE      98 - 107 mmol/L 102    Bicarbonate      21 - 32 mmol/L 31    Anion Gap      10 - 20 mmol/L 11    Blood Urea Nitrogen      6 - 23 mg/dL 15    Creatinine      0.50 - 1.05 mg/dL 0.92    EGFR      >60 mL/min/1.73m*2 64    Calcium      8.6 - 10.3 mg/dL 9.7    Albumin      3.4 - 5.0 g/dL 4.1    Alkaline Phosphatase      33 - 136 U/L 23 (L)    Total Protein      6.4 - 8.2 g/dL 6.4    AST      9 - 39 U/L 17    Bilirubin Total      0.0 - 1.2 mg/dL 0.6    ALT      7 - 45 U/L 12    Color, Urine      Straw, Yellow  Yellow    Appearance, Urine      Clear  Clear    Specific Gravity, Urine      1.005 - 1.035  1.010    pH, Urine      5.0, 5.5, 6.0, 6.5, 7.0, 7.5, 8.0  6.0    Protein, Urine      NEGATIVE mg/dL NEGATIVE    Glucose, Urine      NEGATIVE mg/dL NEGATIVE    Blood, Urine      NEGATIVE  SMALL (1+) !    Ketones, Urine      NEGATIVE mg/dL NEGATIVE    Bilirubin, Urine      NEGATIVE  NEGATIVE    Urobilinogen, Urine      <2.0 mg/dL <2.0    Nitrite, Urine      NEGATIVE  NEGATIVE    Leukocyte Esterase, Urine      NEGATIVE  NEGATIVE    CHOLESTEROL      0 - 199 mg/dL 183    HDL CHOLESTEROL      mg/dL 64.6    Cholesterol/HDL Ratio 2.8    LDL Calculated      <=99 mg/dL 101 (H)    VLDL      0 - 40 mg/dL 18    TRIGLYCERIDES      0 - 149 mg/dL 88    Non HDL Cholesterol      0 - 149 mg/dL 118    WBC, Urine      1-5, NONE /HPF NONE    RBC, Urine      NONE, 1-2, 3-5 /HPF 1-2    Squamous Epithelial Cells, Urine      Reference range not established. /HPF 1-9 (SPARSE)    Bacteria, Urine      NONE SEEN /HPF 1+  !    Mucus, Urine      Reference range not established. /LPF 1+    Vitamin D, 25-Hydroxy, Total      30 - 100 ng/mL 49    Thyroid Stimulating Hormone      0.44 - 3.98 mIU/L 1.21    Vitamin B12      211 - 911 pg/mL 515           Assessment and Plan:  Problem List Items Addressed This Visit          High    Routine adult health maintenance - Primary (Chronic)    Overview     Pfizer COVID 19 vaccine 2/26/21, 3/19/21, 10/7/21, 5/11/22,11/08/2022, 9/7/23  Influenza Seasonal 10/9/2017, 9/30/2016, 10/16/2015, 9/24/21, 9/7/23  Pneumococcal-13 Vac Conjugate9/1/2015   Pneumovax 8/2/2011, 9/14/20   RSV 9/7/23  Tdap (Age 7+)5/30/2006   Tetanus Diphtheria Booster (Age >7) Pres Free 6/20/2016   Zostavax5/22/2008  Shingrix 2/23/19, 5/13/19  CScope 1/3/12 (8yrs); 9/19 (6yrs)  Mamm 7/18; 10/30/19; 11/2/20, 12/20/21, 12/22/22  BMD 6/7/18, 12/20/21  Hep C Ab (-) 8/24/15  S/p TAHBSO         Current Assessment & Plan     Annual Wellness exam completed   Preventive Health History reviewed  Ordered:   Labs    Mammogram   BMD   Cscope   Plan for PCV 20 in 2025           Relevant Orders    DNR and No Intubation (Completed)       Medium    Advanced directives, counseling/discussion    Overview     11/2/23: HCPOA: Trav Munson (Brother)  DNR Arrest         Anxiety    Overview     Diazepam made anxiety worse  On lexapro  monitor         Current Assessment & Plan     Increase Lexapro to 15mg         Relevant Medications    escitalopram (Lexapro) 10 mg tablet    Other Relevant Orders    TSH with reflex to Free T4 if abnormal    Cardiac risk counseling    Overview     11/2/23: Current 10-Year ASCVD Risk:  17.1% - Intermediate Risk    Avoid ASA given gastrtis/esophagitis         CKD (chronic kidney disease) stage 2, GFR 60-89 ml/min    Relevant Orders    Lipid Panel    Current moderate episode of major depressive disorder without prior episode (CMS/HCC)    Overview     On Lexapro           Current Assessment & Plan     Sx better but not 100%  controlled  Will try increasing dose SSRI         Relevant Orders    TSH with reflex to Free T4 if abnormal    Emphysema lung (CMS/HCC)    Overview     PFTs 11/22; no obstruction, air trapping and reduced DLCO.   CXR in 9/2022 shows emphysematous changes.   CT/PE was normal.   Normal echo  S/p Pulm consult  A1AT testing (-)  Asymptomatic  Monitor         Encounter for screening mammogram for breast cancer    Relevant Orders    BI mammo bilateral screening tomosynthesis    Essential tremor    Overview     On BB  Primidone interacts with Eliquis  Tried Gabapentin for sleep and thinks she had GI side effects         Relevant Orders    Referral to Neurology    Fecal urgency    Current Assessment & Plan     Will get EGD/Colonoscopy  Is increasing PPI to BID for cough - if symptoms worsen it could be cause for symptoms  Will have her see GI         Relevant Orders    TSH with reflex to Free T4 if abnormal    Comprehensive Metabolic Panel    CBC and Auto Differential    Referral to Gastroenterology    H/O esophagitis    Overview     EGD 9/19: Impression:     - LA Grade A reflux esophagitis. Biopsied.             - Benign-appearing esophageal stenosis. Dilated.             - Chronic gastritis. Biopsied.  On PPI         Menopause    Relevant Orders    XR DEXA bone density    Obstructive sleep apnea    Overview     6/23 PSG: Mild SHIMA, O2  90%, AHI 8.4. AutoPAP [id number] rec'd   DOes not use CPAP and doesn't want to         Relevant Orders    Referral to ENT    Paroxysmal atrial fibrillation (CMS/HCC)    Overview     7/5/22: Atrial fibrillation with rapid ventricular response, ? Secondary to colitis/infection  Holter x 40 hours 8/22 did not see AF, but [id number] score of 3  On Eliquis and BB  Monitor         Relevant Orders    Urinalysis with Reflex Microscopic    Lipid Panel    Paroxysmal supraventricular tachycardia    Overview     Holter 10/22: Sinus karen/tachycardia. , average 74. 741 PVCs (0.05% burden),  8565 PSVCs (0.58%), 23 occurrences of PSVT, longest 34 beats, fastest 162  1 reported event  On BB (and Eliquis due to hx PAF)  Monitor         Relevant Orders    Urinalysis with Reflex Microscopic    Lipid Panel    Follow Up In [facility] - Pharmacy    Screening for multiple conditions    Overview     Depression screening completed           Vitamin B12 deficiency    Relevant Orders    Vitamin B12    Vitamin D deficiency    Overview     Goal 40-50  on supplement         Relevant Orders    Vitamin D 25-Hydroxy,Total (for eval of Vitamin D levels)     Other Visit Diagnoses       Chronic cough        Increase PPI to BID  If sx persist see ENT    Relevant Orders    Referral to ENT    Chronic nonintractable headache, unspecified headache type        Will get MRI brain   ? med side effect (SSRI or PPI)  ? stress, untreated SHIMA?  See if ENT has any thoughts    Relevant Orders    Referral to ENT    Sedimentation Rate    C-reactive protein    MR brain wo IV contrast    Referral to Neurology    Encounter for general adult medical examination without abnormal findings        Anxiety disorder, unspecified        Obstructive sleep apnea (adult) (pediatric)        Major depressive disorder, single episode, moderate (CMS/HCC)        Encounter for screening mammogram for malignant neoplasm of breast        Asymptomatic menopausal state        Essential tremor        Paroxysmal atrial fibrillation (CMS/HCC)        Supraventricular tachycardia, unspecified        Personal history of other diseases of the digestive system        Chronic cough        Headache, unspecified        Fecal urgency        Vitamin D deficiency, unspecified        Deficiency of other specified B group vitamins        Encounter for screening for other disorder        Other specified counseling        Other emphysema (CMS/HCC)        Chronic kidney disease, stage 2 (mild)        Other chronic pain                Active Problem List  Patient Active  Problem List   Diagnosis    Abnormal bladder cytology    Acquired cyst of kidney    Anxiety    Atrial flutter (CMS/HCC)    Cervical disc disease    Thoracic disc disease    Chronic GERD    Diastolic dysfunction, left ventricle    Disorder of bone density and structure, unspecified    Diverticulosis of colon    Elevated homocysteine    Emphysema lung (CMS/HCC)    Essential tremor    Grade I hemorrhoids    Hepatic cyst    Lumbar spondylosis    Neuropathic pain of left lower extremity    Nocturia    Obstructive sleep apnea    Osteoarthritis, knee    Paroxysmal atrial fibrillation (CMS/HCC)    Paroxysmal supraventricular tachycardia    Spondylosis of cervical region without myelopathy or radiculopathy    Urethral stricture    Vitamin B12 deficiency    Vitamin D deficiency    Routine adult health maintenance    Advanced directives, counseling/discussion    Cardiac risk counseling    H/O esophagitis    H/O gastritis    Screening for multiple conditions    CKD (chronic kidney disease) stage 2, GFR 60-89 ml/min    Current moderate episode of major depressive disorder without prior episode (CMS/HCC)    Lumbar disc disease    Fecal urgency    Encounter for screening mammogram for breast cancer    Menopause       Comprehensive Medical/Surgical/Social/Family History  Past Medical History:   Diagnosis Date    Abnormal MRI, musculoskeletal     MRI hip 6/22: 1. Atrophy of the left gluteus medius and minimus consistent with chronic tendon tears with associated trochanteric bursitis. 2. Mild osteoarthrosis of the left hip joint and small left joint effusion. 3. Mild feathery edema involving the left rectus femoris and adductor longus with adjacent intermuscular edema suggestive of mild component of muscular injury.    Abnormal MRI, musculoskeletal     pt 2: MRI Tib/Fib 1/2020: Benign appearing area in the proximal tibial metaphysis, without suspicious features, likely postsurgical defect    Abnormal x-ray of extremity     XR Tib/fib  5/7/21: IMPRESSION: Cortical based lucency with irregularity in the posterior cortex of the proximal tibial diaphysis. This may represent a prior fibrous lesions. Comparison with prior studies is advised. Followup in 6 months advised for stability if no priors available    Abnormal x-ray of extremity     pt 2: XR Knee: lytic lesion proximal third tibia, knee compartments normal (MRI done that shows postsurgical lesion) 1/16/21: Benign appearing areas in the proximal tibial metaphysis, without suspicious features, likley postsurgical defect give pts hx    H/O bone density study 12/20/2021    Ten Year Major Osteoporotic Fracture Risk: 22.52% Ten Year Hip Fracture Risk: 13.6% TScore: 2.2 6/18: THE LOWEST TSCORE IS 1.7, FRAX 10/2%    H/O cervical spine x-ray 04/2019    Mild decrease in the intervertebral disc space at the C56 and C6C7. Moderate facet arthropathy with uncovertebral hypertrophy at C4C5 through C6C7    H/O chest x-ray 08/2014    normal 10/22/21: Mild discogenic degenerative changes are seen throughout the thoracic spine. 7/22: normal    H/O CT scan of abdomen     2/13: liver and kidney cysts 7/22: Stomach is decompressed, slightly limiting its evaluation with equivocal mucosal hyperemia and submucosal edema, somewhat suspicious for acute gastritis. There is mild prominence of mucosal folds and subjective mucosal hyperemia in small bowel, with mesenteric edema, suspicious for infectious/inflammatory enteritis.    H/O CT scan of abdomen     pt 2: Colon is mostly decompressed, with small volume of stool in the proximal colon. Mucosal hyperemia in the colon, most pronounced involving the descending and rectosigmoid colon, with mesocolonic and mesorectal fat stranding, suspicious for acute proctocolitis. No bowel dilation. Mild pelvic ascites, probably reactive in etiology. No pneumoperitoneum or loculated intraperitoneal collection.    H/O CT scan of abdomen     pt 3: Small hepatic and renal cortical cysts.  Thoracolumbar scoliosis and lumbar spine degenerative changes. Repeat CT 7/21/22: Imp approved hyperemia of small bowel and distal colon. Mild generalized mesenteric edema persists with small volume fluid which    H/O CT scan of brain 11/2021    There are scattered dural ossifications most prominently along the left frontal lobe where there is a 34 mm thick 15 mm diameter ossification. No associated soft tissue mass to suggest meningioma is noted.    H/O CT scan of chest 08/2020    CT: NO PE    H/O echocardiogram 04/2005    Trivial MR with no mitral valve prolapse. Normal LV fx, neg for ischemia 11/20: 1.left ventricular systolic function normal,60-65% est ejection fraction. 2. Poorly visualized anatomical struct due to subopt image. 3. Spectral Doppler shows an impaired relaxation pattern of left ventricular diastolic filling. 4. RVSP within normal limits. 5. No significant valvular pathology seen. 6. No prior echo    H/O spine x-ray 06/22/2023    XR Sacrum: Acute angulation at the sacrococcygeal junction which may reflect a prior injury/fracture.    H/O x-ray of lumbar spine 06/22/2023    There is mild anterolisthesis of L3 on L4 and L4 on L5. There is mild multilevel endplate sclerosis and osteophytosis. There is moderate facet disease lower lumbar spine as well    History of colitis     CT 7/22 Stomach is decompressed, slightly limiting its evaluation with equivocal mucosal hyperemia and submucosal edema, somewhat suspicious for acute gastritis. There is mild prominence of mucosal folds and subjective mucosal hyperemia in small bowel, with mesenteric edema, suspicious for infectious/inflammatory enteritis.    History of colitis     pt 2: Colon is mostly decompressed, with small volume of stool in the proximal colon. Mucosal hyperemia in the colon, most pronounced involving the descending and rectosigmoid colon, with m    History of PFTs 11/2022    DLCO substantially reduced, air trapping also    Holter monitor,  abnormal 10/2022    Sinus karen/tachycardia. 63465, average 74. 741 PVCs (0.05% burden), 8565 PSVCs (0.58%), 23 occurrences of PSVT, longest 34 beats, fastest 162 1 reported event 8/22: HR 73837, average 94. Rare PACs, PVCs, no AF (40hours) 7 events recorded, all autotriggered     Past Surgical History:   Procedure Laterality Date    APPENDECTOMY      BREAST BIOPSY      Biopsy Breast Percutaneous Needle Core, 10/18: Right breast mass, needle localized excisional biopsy (A)  Proliferative epithelial changes including radial sclerosing lesions, sclerosing adenosis and usual ductal hyperplasia (please see comment)  Microcalcifications present in proliferative epithelial changes and unremarkable lobules    BREAST BIOPSY  10/2018    sclerosing lesions, sclerosing adenosis and usual ductal hyperplasia 1. Right breast mass, needle localized excisional biopsy (A)  Proliferative epithelial changes including radial sclerosing lesions, sclerosing adenosis and usual ductal hyperplasia. Microcalcifications present in proliferative epithelial changes and unremarkable lobules/Biopsy clip and biopsy site reparative changes identified    BREAST BIOPSY      pt 2:2. Right breast, new medial margin, excision (B)  Fibrofatty breast tissue 3. Left breast, needle localized excisional biopsy (C)  Proliferative epithelial changes including radial sclerosing lesions, sclerosing adenosis and usual ductal hyperplasia (please see comment)  Microcalcifications present in proliferative epithelial changes and unremarkable lobules    BREAST BIOPSY      pt 3: Biopsy clip and biopsy site reparative changes identified 4. Left breast, new medial margin, excision (D)  Proliferative epithelial changes    CARDIAC CATHETERIZATION  09/14/2020 9/13: normal POSTOPERATIVE DIAGNOSES: 1. Normal LEFT MAIN. 2. Normal LAD. 3. Normal CIRCUMFLEX. 4. Normal RIGHT CORONARY ARTERY. 5. Normal left ventricular function, ejection fraction of 70%.    COLONOSCOPY   10/05/2021    1/11: Hemorrhoids; normal (8); diverticulosis, biopsy: (-) 2019: Diverticulosis, polyp:  Ascending colon, polypectomy (C) - Fragments of tubular adenoma.    CYSTOSCOPY  10/05/2021    6/21: Cystoscopy Findings: atrophic vaginitis.  Cystoscopy today reveals a urethral stricture dilated to 20 I[place] with minimal pain. Once inside the bladder there is severe erythema in the trigone. Mild trabeculations. No evidence of stones or tumors. Flow rate 9 cc/s, total volume 172 cc, PVR 61 cc.    ESOPHAGOGASTRODUODENOSCOPY  10/05/2021    6/21: Cystoscopy Findings: atrophic vaginitis.  Cystoscopy today reveals a urethral stricture dilated to 20 [place] with minimal pain. Once inside the bladder there is severe erythema in the trigone. Mild trabeculations. No evidence of stones or tumors. Flow rate 9 cc/s, total volume 172 cc, PVR 61 cc.    FLEXOR TENDON OF FOREARM / WRIST REPAIR      OTHER SURGICAL HISTORY      Adjacent Tissue Transfer    POLYSOMNOGRAPHY  2023    PSG: Mild SHIMA, O2  90%, AHI 8.4. 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 (no drug eruption, lupus, etc)    TOTAL ABDOMINAL HYSTERECTOMY      Hysterectomy, TAHBSO (endometriosis and fibroids)    TUMOR EXCISION      Excision Of Leg Soft Tissue Tumor     Social History     Social History Narrative    Never     No kids    Retired    Nonsmoker    Social ETOH    ---    Family History:    M:  Alzheimer's Disease, Fell, broke hip/OP, tremor ( age 90)    F:  'old age' at 92    B:  None                                                          B:  Lipids, HTN                     B: Hearing Loss, HTN    S:  Anemia    S: oral CA, stroke ( age 55)    MUncle:  Colon Cancer    PAunts:Breast Cancer       x2 and cousin     Tobacco/Alcohol/Opioid use, as well as Illicit Drug Use was screened for/reviewed and documented in Social Documentation section of the chart and medication list  as appropriate    Allergies and Medications  Amoxicillin-pot clavulanate, Celecoxib, Diazepam, Enoxaparin, Gabapentin, Meloxicam, Mometasone, Naproxen, Nitrofurantoin macrocrystal, Piroxicam, Propranolol, Ranitidine hcl, Amitriptyline, and Medrol [methylprednisolone]  Current Outpatient Medications   Medication Instructions    apixaban (Eliquis) 5 mg tablet 1 tablet, oral, 2 times daily    calcium carbonate-vitamin D3 600 mg-20 mcg (800 unit) tablet 2 tablets, oral, Daily    escitalopram (LEXAPRO) 15 mg, oral, Daily    esomeprazole (NEXIUM) 20 mg, oral, Daily    metoprolol succinate XL (TOPROL-XL) 25 mg, oral, Daily    multivitamin with minerals iron-free (Multivitamin 50 Plus) 1 tablet, oral, Daily    omega 3-dha-epa-fish oil (Fish OiL) 100-160-1,000 mg capsule 1 capsule, oral, Daily     Medications and Supplements  prescribed by me and other practitioners or clinical pharmacist (such as prescriptions, OTC's, herbal therapies and supplements) were reviewed and documented in the medical record.      Activities of Daily Living  In your present state of health, do you have any difficulty performing the following activities?:   Preparing food and eating?: No  Bathing yourself: No  Getting dressed: No  Using the toilet:No  Moving around from place to place: No  In the past year have you fallen or had a near fall?:No  Able to manage finances independently: Yes  Able to perform grocery shopping: Yes  Able to manage medications independently: Yes  Able to do housework independently: Yes  Patient self-assessment of health status? Good    Patient Care Team:  Gracie Tim MD as PCP - General  Deloris Bryant RN as Care Manager (Case Management)  Ash Burnett MD as Consulting Physician (Cardiology)  Stevenson Crowell MD as Consulting Physician (Cardiology)   ENT: [facility]    Current exercise habits: walking   Dietary issues discussed: Yes  Hearing difficulties: No  Safe in current home environment: Yes  Visual Acuity  assessed: No  Cognitive Impairment No    Depression Screening  Depression screening (15m) completed using the PHQ-2 questions with results documented in the chart/encounter  (Rooming Screening section for documentation, or note for additional information)    Cardiac Risk Assessment  Cardiovascular risk was discussed and, if needed, lifestyle modifications recommended, including nutritional choices, exercise, and elimination of habits contributing to risk.   We agreed on a plan to reduce the current cardiovascular risk based on above discussion as needed.     Aspirin use/disuse was discussed and documented in the Problem List of the medical record (under Cardiac Risk Counseling) after reviewing the updated guidelines below:  Consider low dose Aspirin ( mg) use if the benefit for cardiovascular disease prevention outweighs risk for bleeding complications.   In general, low dose ASA should be considered:  In patients WITHOUT prior MI/stroke/PAD (primary prevention):   a. Age <60: Use if 10-year cardiovascular disease risk >20%, with discussion of risks and benefits with patient  b. Age 60-<70: Use if 10-year cardiovascular disease risk >20% and low bleeding (e.g., gastrointestinal) risk, with discussion of risks and benefits with patient  c. Age >=70: Do not use    In patients WITH prior MI/stroke/PAD (secondary prevention):   Generally use unless extremely high bleeding (e.g., gastrointenstinal) risk, with discussion of risks and benefits with patient    Advance Directives Discussion  Advanced Care Planning (including a Living Will, Healthcare POA, as well as specific end of life choices and/or directives), was discussed with the patient and/or surrogate, voluntarily, and details of that discussion documented in the Problem List (under Advanced Directives Discussion) of the medical record.    (~16 min spent discussing above)     ROS otherwise negative aside from what was mentioned above in HPI.    Vitals  BP  "117/68   Pulse 61   Temp 36.6 °C (97.8 °F)   Ht 1.676 m (5' 6\")   Wt 67.3 kg (148 lb 6 oz)   BMI 23.95 kg/m²   Body mass index is 23.95 kg/m².  Physical Exam  Gen: Alert, NAD  HEENT:  Unremarkable, throat and palate look normal  Neck:  No PRITESH  Respiratory:  Lungs CTAB  Cardiovascular:  Heart RRR  Neuro:  Gross motor and sensory intact; + Head and arm/hand tremor  Skin:  No suspicious lesions present  Breast: No masses, or axillary lymphadenopathy    During the course of the visit the patient was educated and counseled about age appropriate screening and preventive services. Completed preventive screenings were documented in the chart and orders were placed for outstanding screenings/procedures as documented in the Assessment and Plan.    Time spent prepping/preparing for visit as well as pre/post-visit charting: 10 minutes  Time spent directly with Patient: 49 minutes  Total Time: 59 minutes    Patient Instructions (the written plan) was given to the patient at check out.  "

## 2023-11-02 NOTE — ASSESSMENT & PLAN NOTE
Will get EGD/Colonoscopy  Is increasing PPI to BID for cough - if symptoms worsen it could be cause for symptoms  Will have her see GI

## 2023-11-06 ENCOUNTER — TELEMEDICINE (OUTPATIENT)
Dept: PHARMACY | Facility: HOSPITAL | Age: 78
End: 2023-11-06
Payer: MEDICARE

## 2023-11-06 DIAGNOSIS — I47.10 PAROXYSMAL SUPRAVENTRICULAR TACHYCARDIA (CMS-HCC): ICD-10-CM

## 2023-11-06 NOTE — PROGRESS NOTES
"Subjective   Patient ID: Adelia Munson is a 78 y.o. female who presents for cost assistance with Eliquis    Referring Provider: Gracie Tim MD       Objective     There were no vitals taken for this visit.     Labs  Lab Results   Component Value Date    BILITOT 0.6 10/16/2023    CALCIUM 9.7 10/16/2023    CO2 31 10/16/2023     10/16/2023    CREATININE 0.92 10/16/2023    GLUCOSE 77 10/16/2023    ALKPHOS 23 (L) 10/16/2023    K 4.3 10/16/2023    PROT 6.4 10/16/2023     10/16/2023    AST 17 10/16/2023    ALT 12 10/16/2023    BUN 15 10/16/2023    ANIONGAP 11 10/16/2023    MG 1.94 07/08/2022     09/12/2022    ALBUMIN 4.1 10/16/2023    AMYLASE 61 08/07/2018    LIPASE 17 07/23/2022    GFRF 71 10/04/2022     Lab Results   Component Value Date    TRIG 88 10/16/2023    CHOL 183 10/16/2023    LDLCALC 101 (H) 10/16/2023    HDL 64.6 10/16/2023     No results found for: \"HGBA1C\"    Current Outpatient Medications on File Prior to Visit   Medication Sig Dispense Refill    calcium carbonate-vitamin D3 600 mg-20 mcg (800 unit) tablet Take 2 tablets by mouth once daily.      escitalopram (Lexapro) 10 mg tablet Take 1.5 tablets (15 mg) by mouth once daily. 135 tablet 3    esomeprazole (NexIUM) 20 mg DR capsule TAKE ONE CAPSULE BY MOUTH DAILY 90 capsule 0    metoprolol succinate XL (Toprol-XL) 25 mg 24 hr tablet Take 1 tablet (25 mg) by mouth once daily.      multivitamin with minerals iron-free (Multivitamin 50 Plus) Take 1 tablet by mouth once daily.      omega 3-dha-epa-fish oil (Fish OiL) 100-160-1,000 mg capsule Take 1 capsule by mouth once daily.      [DISCONTINUED] apixaban (Eliquis) 5 mg tablet Take 1 tablet (5 mg) by mouth 2 times a day.      [DISCONTINUED] b complex 0.4 mg tablet Take 1 tablet by mouth every other day.      [DISCONTINUED] cyanocobalamin (Vitamin B-12) 1,000 mcg tablet Take 100 mcg by mouth every other day.      [DISCONTINUED] escitalopram (Lexapro) 10 mg tablet Take 1 tablet (10 mg) by " mouth once daily. 90 tablet 3     No current facility-administered medications on file prior to visit.        Assessment/Plan   Problem List Items Addressed This Visit       Paroxysmal supraventricular tachycardia      Patient Assistance Screening (VAF)    Patient verbally reports monthly or yearly income which is less than 400% federal poverty level     Application for program has been submitted for the following medications: Eliquis 5mg    Patient has been informed that program team will be reaching out to them to discuss necessary documentation, instructed to answer phone/return voicemail. Patient will plan to mail her tax information into the specialty precert team.     Patient aware this process may take up to 6 weeks.     If approved medication must be filled through [facility] pharmacy and may be picked up or mailed to patient.            Relevant Medications    apixaban (Eliquis) 5 mg tablet     Pharmacy Follow Up: 12/4 @ 3:30 or sooner with PAP determination.     Nelia Ndiaye, PharmD    Continue all meds under the continuation of care with the referring provider and clinical pharmacy team.

## 2023-11-06 NOTE — ASSESSMENT & PLAN NOTE
Patient Assistance Screening (VAF)    Patient verbally reports monthly or yearly income which is less than 400% federal poverty level     Application for program has been submitted for the following medications: Eliquis 5mg    Patient has been informed that program team will be reaching out to them to discuss necessary documentation, instructed to answer phone/return voicemail. Patient will plan to mail her tax information into the specialty precert team.     Patient aware this process may take up to 6 weeks.     If approved medication must be filled through [facility] pharmacy and may be picked up or mailed to patient.

## 2023-11-08 ENCOUNTER — TELEMEDICINE (OUTPATIENT)
Dept: PRIMARY CARE | Facility: CLINIC | Age: 78
End: 2023-11-08
Payer: MEDICARE

## 2023-11-08 ENCOUNTER — PHARMACY VISIT (OUTPATIENT)
Dept: PHARMACY | Facility: CLINIC | Age: 78
End: 2023-11-08
Payer: COMMERCIAL

## 2023-11-08 DIAGNOSIS — U07.1 COVID-19: Primary | ICD-10-CM

## 2023-11-08 PROCEDURE — RXMED WILLOW AMBULATORY MEDICATION CHARGE

## 2023-11-08 PROCEDURE — 99442 PR PHYS/QHP TELEPHONE EVALUATION 11-20 MIN: CPT | Performed by: NURSE PRACTITIONER

## 2023-11-08 RX ORDER — ALBUTEROL SULFATE 90 UG/1
2 AEROSOL, METERED RESPIRATORY (INHALATION) EVERY 4 HOURS PRN
Qty: 18 G | Refills: 11 | Status: SHIPPED | OUTPATIENT
Start: 2023-11-08 | End: 2023-12-06 | Stop reason: WASHOUT

## 2023-11-08 RX ORDER — BENZONATATE 200 MG/1
200 CAPSULE ORAL 3 TIMES DAILY PRN
Qty: 42 CAPSULE | Refills: 0 | Status: SHIPPED | OUTPATIENT
Start: 2023-11-08 | End: 2023-12-06 | Stop reason: WASHOUT

## 2023-11-08 ASSESSMENT — PATIENT HEALTH QUESTIONNAIRE - PHQ9
1. LITTLE INTEREST OR PLEASURE IN DOING THINGS: NOT AT ALL
SUM OF ALL RESPONSES TO PHQ9 QUESTIONS 1 AND 2: 0
2. FEELING DOWN, DEPRESSED OR HOPELESS: NOT AT ALL

## 2023-11-08 NOTE — PROGRESS NOTES
An audio telecommunication system which permits real time communications between the patient (at the originating site) and provider (at the distant site) was utilized to provide this telehealth service.  Verbal consent was requested and obtained from the patient for this telehealth visit.     Subjective   Patient ID: Adelia Munson is a 78 y.o. female who presents for Sick Visit (Covid positive /Tested positive this morning/).    Sx onset maybe last night  Fatigued  Woke up coughing  Breathing heavily  Runny nose  Ate breakfast  Took covid test at 9am, positive  Last covid was 1 year ago, end July  No SOB or wheeze        Review of Systems  ROS completely negative except what was mentioned in the HPI.  Problem List, surgical, social, and family histories which were reviewed and updated as necessary within the EMR. I also personally reviewed the notes, labs, and imaging that pertained to what was documented or discussed in the HPI.    Objective   Physical Exam  Pulmonary:      Comments: Speaking in complete sentences.  Respirations are audibly regular and unlabored  Neurological:      Mental Status: She is oriented to person, place, and time.   Psychiatric:         Thought Content: Thought content normal.         Judgment: Judgment normal.       There were no vitals taken for this visit.    Assessment/Plan    Problem List Items Addressed This Visit    None  Visit Diagnoses       COVID-19    -  Primary    Discussed risks and benefits to antivirals.  Sx onselt 11/7. Covid postive 11/8.  Pt desires molnupiravir.  Ordere to  pharmacy    Relevant Medications    molnupiravir 200 mg capsule    albuterol 90 mcg/actuation inhaler    inhalational spacing device inhaler    benzonatate (Tessalon) 200 mg capsule

## 2023-11-16 ENCOUNTER — PATIENT OUTREACH (OUTPATIENT)
Dept: PRIMARY CARE | Facility: CLINIC | Age: 78
End: 2023-11-16
Payer: MEDICARE

## 2023-11-16 ENCOUNTER — TELEPHONE (OUTPATIENT)
Dept: CARDIOLOGY | Facility: CLINIC | Age: 78
End: 2023-11-16

## 2023-11-16 DIAGNOSIS — G47.33 OBSTRUCTIVE SLEEP APNEA: ICD-10-CM

## 2023-11-16 DIAGNOSIS — F32.1 CURRENT MODERATE EPISODE OF MAJOR DEPRESSIVE DISORDER WITHOUT PRIOR EPISODE (MULTI): ICD-10-CM

## 2023-11-16 DIAGNOSIS — F41.9 ANXIETY: ICD-10-CM

## 2023-11-16 DIAGNOSIS — I48.0 PAROXYSMAL ATRIAL FIBRILLATION (MULTI): ICD-10-CM

## 2023-11-16 PROCEDURE — 99490 CHRNC CARE MGMT STAFF 1ST 20: CPT | Performed by: INTERNAL MEDICINE

## 2023-11-16 NOTE — PROGRESS NOTES
Recovering from Covid-19  Overall doing well  Lingering cough still  This has been ongoing and very irritating  She is working on seeing ENT for this    11/29 having colonoscopy and endoscopy    Working on getting lower price on Eliquis  Will get medication mailed to her from [facility] pharmacy    Doing PT exercises  These are helping back hip    Elbow is about the same  Going to have cataract surgery   Getting measured on Monday    Confirmed appointment with Arleth Lawler 12/14/23

## 2023-11-16 NOTE — TELEPHONE ENCOUNTER
Patient called and having a colonoscopy and EDG on Nov. 29 pt on eliquis when should she stop these please advise    [phone]

## 2023-11-17 NOTE — TELEPHONE ENCOUNTER
Patient informed stop taking blood thinners 2 days prior to procedure and then ask the Dr. Doing the procedure when to start again.

## 2023-11-24 ENCOUNTER — ANCILLARY PROCEDURE (OUTPATIENT)
Dept: RADIOLOGY | Facility: CLINIC | Age: 78
End: 2023-11-24
Payer: MEDICARE

## 2023-11-24 DIAGNOSIS — R51.9 CHRONIC NONINTRACTABLE HEADACHE, UNSPECIFIED HEADACHE TYPE: ICD-10-CM

## 2023-11-24 DIAGNOSIS — G89.29 CHRONIC NONINTRACTABLE HEADACHE, UNSPECIFIED HEADACHE TYPE: ICD-10-CM

## 2023-11-24 PROCEDURE — 70551 MRI BRAIN STEM W/O DYE: CPT | Performed by: RADIOLOGY

## 2023-11-24 PROCEDURE — 70551 MRI BRAIN STEM W/O DYE: CPT

## 2023-11-27 ENCOUNTER — TELEPHONE (OUTPATIENT)
Dept: PRIMARY CARE | Facility: CLINIC | Age: 78
End: 2023-11-27
Payer: MEDICARE

## 2023-11-27 NOTE — TELEPHONE ENCOUNTER
Patient is having a colonoscopy and endoscopy on 11/29 and wants to know when and if she can stop eliquis.

## 2023-11-28 ENCOUNTER — PATIENT MESSAGE (OUTPATIENT)
Dept: PRIMARY CARE | Facility: CLINIC | Age: 78
End: 2023-11-28
Payer: MEDICARE

## 2023-11-28 DIAGNOSIS — F41.9 ANXIETY: ICD-10-CM

## 2023-11-28 RX ORDER — ESCITALOPRAM OXALATE 20 MG/1
20 TABLET ORAL DAILY
Qty: 90 TABLET | Refills: 3 | Status: SHIPPED | OUTPATIENT
Start: 2023-11-28 | End: 2023-12-27 | Stop reason: SDUPTHER

## 2023-11-28 NOTE — PATIENT COMMUNICATION
Spoke to pt she will do the 20 mg tablet once daily  and that way she can use up the 10 mg tabs she has

## 2023-12-04 ENCOUNTER — APPOINTMENT (OUTPATIENT)
Dept: PHARMACY | Facility: HOSPITAL | Age: 78
End: 2023-12-04
Payer: MEDICARE

## 2023-12-06 ENCOUNTER — OFFICE VISIT (OUTPATIENT)
Dept: CARDIOLOGY | Facility: CLINIC | Age: 78
End: 2023-12-06
Payer: MEDICARE

## 2023-12-06 VITALS
DIASTOLIC BLOOD PRESSURE: 70 MMHG | BODY MASS INDEX: 23.53 KG/M2 | HEART RATE: 65 BPM | HEIGHT: 66 IN | WEIGHT: 146.4 LBS | SYSTOLIC BLOOD PRESSURE: 130 MMHG

## 2023-12-06 DIAGNOSIS — I48.0 PAROXYSMAL ATRIAL FIBRILLATION (MULTI): Primary | ICD-10-CM

## 2023-12-06 PROCEDURE — 1036F TOBACCO NON-USER: CPT | Performed by: INTERNAL MEDICINE

## 2023-12-06 PROCEDURE — 99213 OFFICE O/P EST LOW 20 MIN: CPT | Performed by: INTERNAL MEDICINE

## 2023-12-06 PROCEDURE — 1159F MED LIST DOCD IN RCRD: CPT | Performed by: INTERNAL MEDICINE

## 2023-12-06 PROCEDURE — 1160F RVW MEDS BY RX/DR IN RCRD: CPT | Performed by: INTERNAL MEDICINE

## 2023-12-06 ASSESSMENT — PATIENT HEALTH QUESTIONNAIRE - PHQ9
SUM OF ALL RESPONSES TO PHQ9 QUESTIONS 1 AND 2: 0
1. LITTLE INTEREST OR PLEASURE IN DOING THINGS: NOT AT ALL
2. FEELING DOWN, DEPRESSED OR HOPELESS: NOT AT ALL
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
## 2023-12-13 PROCEDURE — RXMED WILLOW AMBULATORY MEDICATION CHARGE

## 2023-12-14 ENCOUNTER — APPOINTMENT (OUTPATIENT)
Dept: PRIMARY CARE | Facility: CLINIC | Age: 78
End: 2023-12-14
Payer: MEDICARE

## 2023-12-18 ENCOUNTER — TELEMEDICINE (OUTPATIENT)
Dept: PHARMACY | Facility: HOSPITAL | Age: 78
End: 2023-12-18
Payer: MEDICARE

## 2023-12-18 DIAGNOSIS — I48.0 PAROXYSMAL ATRIAL FIBRILLATION (MULTI): Primary | ICD-10-CM

## 2023-12-18 NOTE — ASSESSMENT & PLAN NOTE
Informed patient today that she was approved for  Patient Assistance for her Eliquis. This prescription will come to her from  for the next year for no charge. Informed patient that we will follow up in 1 year to re-enroll her in the program. We can also add any other prescriptions to the program if she gets any other brand name prescriptions.    Plan:  CONTINUE Eliquis per cardiology    Pharmacy Follow Up: 12/2024 for VAF renewal

## 2023-12-18 NOTE — PROGRESS NOTES
"Subjective   Patient ID: Adelia Munson is a 78 y.o. female who presents for Eliquis Cost Assistance.    Referring Provider: Gracie Tim MD       Objective     There were no vitals taken for this visit.     Labs  Lab Results   Component Value Date    BILITOT 0.6 10/16/2023    CALCIUM 9.7 10/16/2023    CO2 31 10/16/2023     10/16/2023    CREATININE 0.92 10/16/2023    GLUCOSE 77 10/16/2023    ALKPHOS 23 (L) 10/16/2023    K 4.3 10/16/2023    PROT 6.4 10/16/2023     10/16/2023    AST 17 10/16/2023    ALT 12 10/16/2023    BUN 15 10/16/2023    ANIONGAP 11 10/16/2023    MG 1.94 07/08/2022     09/12/2022    ALBUMIN 4.1 10/16/2023    AMYLASE 61 08/07/2018    LIPASE 17 07/23/2022    GFRF 71 10/04/2022     Lab Results   Component Value Date    TRIG 88 10/16/2023    CHOL 183 10/16/2023    LDLCALC 101 (H) 10/16/2023    HDL 64.6 10/16/2023     No results found for: \"HGBA1C\"    Current Outpatient Medications on File Prior to Visit   Medication Sig Dispense Refill    apixaban (Eliquis) 5 mg tablet Take 1 tablet (5 mg) by mouth 2 times a day. 60 tablet 3    calcium carbonate-vitamin D3 600 mg-20 mcg (800 unit) tablet Take 2 tablets by mouth once daily.      escitalopram (Lexapro) 20 mg tablet Take 1 tablet (20 mg) by mouth once daily. 90 tablet 3    esomeprazole (NexIUM) 20 mg DR capsule TAKE ONE CAPSULE BY MOUTH DAILY 90 capsule 0    metoprolol succinate XL (Toprol-XL) 25 mg 24 hr tablet Take 1 tablet (25 mg) by mouth once daily.      multivitamin with minerals iron-free (Multivitamin 50 Plus) Take 1 tablet by mouth once daily.      omega 3-dha-epa-fish oil (Fish OiL) 100-160-1,000 mg capsule Take 1 capsule by mouth once daily.       No current facility-administered medications on file prior to visit.        Assessment/Plan   Problem List Items Addressed This Visit       Paroxysmal atrial fibrillation (CMS/HCC) - Primary     Informed patient today that she was approved for  Patient Assistance for her Eliquis. " This prescription will come to her from  for the next year for no charge. Informed patient that we will follow up in 1 year to re-enroll her in the program. We can also add any other prescriptions to the program if she gets any other brand name prescriptions.    Plan:  CONTINUE Eliquis per cardiology    Pharmacy Follow Up: 12/2024 for [facility] renewal            Nelia Ndiaye, PharmD    Continue all meds under the continuation of care with the referring provider and clinical pharmacy team.

## 2023-12-21 ENCOUNTER — PHARMACY VISIT (OUTPATIENT)
Dept: PHARMACY | Facility: CLINIC | Age: 78
End: 2023-12-21
Payer: COMMERCIAL

## 2023-12-26 ENCOUNTER — PATIENT OUTREACH (OUTPATIENT)
Dept: PRIMARY CARE | Facility: CLINIC | Age: 78
End: 2023-12-26
Payer: MEDICARE

## 2023-12-26 DIAGNOSIS — I48.0 PAROXYSMAL ATRIAL FIBRILLATION (MULTI): ICD-10-CM

## 2023-12-26 DIAGNOSIS — F32.1 CURRENT MODERATE EPISODE OF MAJOR DEPRESSIVE DISORDER WITHOUT PRIOR EPISODE (MULTI): ICD-10-CM

## 2023-12-26 DIAGNOSIS — G47.33 OBSTRUCTIVE SLEEP APNEA: ICD-10-CM

## 2023-12-26 PROCEDURE — 99490 CHRNC CARE MGMT STAFF 1ST 20: CPT | Performed by: INTERNAL MEDICINE

## 2023-12-26 NOTE — PROGRESS NOTES
July 22- diagnosed with colitis  Ended up being treated for afib  Patient states she has multiple bowel movements per day  As many as 10 some days  Some of these are small and some of them are loose  Her brother recently diagnosed with c-diff and she was reading about this.   We discussed requirements for c-diff testing  She will consider this    Is going to increase her Lexapro to 20mg dose either this week or next week  Scheduled 2/22 with Dr. Barone for recurrent headaches  Typically gets a headache everyday    Confirmed 6 month follow up tomorrow with ZOILA Lawler CNP    CHART REVIEW     -MRI RESULTS 11/24/23  IMPRESSION:  Mild white-matter changes are nonspecific but most likely represent  small-vessel ischemic disease in a patient of this age and also  involve the gonzalo. No evidence of acute ischemic injury or  intracranial mass effect.  -COLONOSCOPY RESULTS:  Impression:              - Moderate diverticulosis in the sigmoid colon and in the descending colon. There was no evidence of diverticular bleeding.   - Biopsies were taken with a cold forceps from the entire colon for evaluation of microscopic colitis.   Recommendation:          - Patient has a contact number available for emergencies. The signs and symptoms of potential delayed complications were discussed with the patient. Return to normal activities tomorrow.   Written discharge instructions were provided to the patient.   - Resume previous diet.   - Continue present medications.   - Await pathology results.   - No repeat colonoscopy due to age.   - Discharge patient to home (ambulatory).

## 2023-12-27 ENCOUNTER — OFFICE VISIT (OUTPATIENT)
Dept: PRIMARY CARE | Facility: CLINIC | Age: 78
End: 2023-12-27
Payer: MEDICARE

## 2023-12-27 VITALS
DIASTOLIC BLOOD PRESSURE: 70 MMHG | TEMPERATURE: 97.7 F | OXYGEN SATURATION: 97 % | WEIGHT: 147.4 LBS | SYSTOLIC BLOOD PRESSURE: 115 MMHG | HEART RATE: 59 BPM | BODY MASS INDEX: 23.79 KG/M2

## 2023-12-27 DIAGNOSIS — G25.0 ESSENTIAL TREMOR: ICD-10-CM

## 2023-12-27 DIAGNOSIS — H26.9 CATARACT OF BOTH EYES, UNSPECIFIED CATARACT TYPE: ICD-10-CM

## 2023-12-27 DIAGNOSIS — F41.9 ANXIETY: Primary | ICD-10-CM

## 2023-12-27 DIAGNOSIS — R15.2 FECAL URGENCY: ICD-10-CM

## 2023-12-27 PROCEDURE — 1159F MED LIST DOCD IN RCRD: CPT | Performed by: NURSE PRACTITIONER

## 2023-12-27 PROCEDURE — 99214 OFFICE O/P EST MOD 30 MIN: CPT | Performed by: NURSE PRACTITIONER

## 2023-12-27 PROCEDURE — 1160F RVW MEDS BY RX/DR IN RCRD: CPT | Performed by: NURSE PRACTITIONER

## 2023-12-27 PROCEDURE — 1036F TOBACCO NON-USER: CPT | Performed by: NURSE PRACTITIONER

## 2023-12-27 RX ORDER — ESCITALOPRAM OXALATE 20 MG/1
10 TABLET ORAL DAILY
Qty: 45 TABLET | Refills: 3
Start: 2023-12-27 | End: 2024-01-07 | Stop reason: SDUPTHER

## 2023-12-27 ASSESSMENT — PATIENT HEALTH QUESTIONNAIRE - PHQ9
SUM OF ALL RESPONSES TO PHQ9 QUESTIONS 1 AND 2: 0
2. FEELING DOWN, DEPRESSED OR HOPELESS: NOT AT ALL
1. LITTLE INTEREST OR PLEASURE IN DOING THINGS: NOT AT ALL

## 2023-12-27 NOTE — PROGRESS NOTES
Problem List Items Addressed This Visit       Anxiety - Primary    Overview     Diazepam made anxiety worse  On lexapro  12/2023: patient  request to decrease from lexapro 15 mg every day back down to 10 mg every day  Addendum: after our visit patient called office and would like to try 20 mg every day   monitor         Relevant Medications    escitalopram (Lexapro) 20 mg tablet    Cataract of both eyes    Current Assessment & Plan     Surgery planned for 2/24         Essential tremor    Overview     On BB  Primidone interacts with Eliquis  Tried Gabapentin for sleep and thinks she had GI side effects         Current Assessment & Plan     Scheduled with neuro 2/22/24           Fecal urgency    Current Assessment & Plan     Dr Tim previously referred to GI  Schedule          Relevant Orders    Referral to Gastroenterology        Subjective   Patient ID: Adelia Munson is a 78 y.o. female who presents for Follow-up (6 week follow up/Has not seen ENT /Appt with Neuro in January/Bone density and mammogram in January/Lexapro taking the 15 till uses them up).  HPI  Anxiety  Lexapro 15 mg every day  Hasn't started 20 mg every day  Would like to go back to 10 mg every day  Addendum: after our visit patient called office and would like to try 20 mg every day   Clenches her teeth x year  Sees dentist  Biting inside of her cheek  Mild SHIMA  - isn't wearing pap device     Essential tremor  Scheduled with neuro  Worse with grasping    Fecal urgency  Hasn't seen GI yet for a visit   Stool is yellowy  Can be sticky  EGD 11/29/23:         - LA Grade A reflux esophagitis with no bleeding.                          Biopsied.                          - Benign-appearing esophageal stenosis. Dilated.                          - Normal stomach. Biopsied.                          - Normal examined duodenum. Biopsied.     Cscope 11/29/23:  - Moderate diverticulosis in the sigmoid colon and                          in the descending colon.  There was no evidence of                          diverticular bleeding.                          - Biopsies were taken with a cold forceps from the                          entire colon for evaluation of microscopic colitis.     FINAL DIAGNOSIS    A. Duodenum, biopsy:  -Duodenal mucosa with no diagnostic abnormality.     B. Stomach, biopsy:  -Gastric oxyntic mucosa with no diagnostic abnormality.      C. Esophagus, biopsy:  -Gastric cardio-oxyntic and squamous mucosa with no diagnostic abnormality.      D. Colon, random, biopsy:  -Colonic mucosa with no diagnostic abnormality.     Bilat cataracts  Plan is for surgery in Feb 2024    Component      Latest Ref Rng 10/16/2023   WBC      4.4 - 11.3 x10*3/uL 4.5    nRBC      0.0 - 0.0 /100 WBCs 0.0    RBC      4.00 - 5.20 x10*6/uL 4.28    HEMOGLOBIN      12.0 - 16.0 g/dL 12.9    HEMATOCRIT      36.0 - 46.0 % 39.4    MCV      80 - 100 fL 92    MCH      26.0 - 34.0 pg 30.1    MCHC      32.0 - 36.0 g/dL 32.7    RED CELL DISTRIBUTION WIDTH      11.5 - 14.5 % 13.2    Platelets      150 - 450 x10*3/uL 193    MEAN PLATELET VOLUME      7.5 - 11.5 fL 10.4    Neutrophils %      40.0 - 80.0 % 67.3    Immature Granulocytes %, Automated      0.0 - 0.9 % 0.4    Lymphocytes %      13.0 - 44.0 % 20.3    Monocytes %      2.0 - 10.0 % 10.4    Eosinophils %      0.0 - 6.0 % 0.9    Basophils %      0.0 - 2.0 % 0.7    Neutrophils Absolute      1.60 - 5.50 x10*3/uL 3.05    Immature Granulocytes Absolute, Automated      0.00 - 0.50 x10*3/uL 0.02    Lymphocytes Absolute      0.80 - 3.00 x10*3/uL 0.92    Monocytes Absolute      0.05 - 0.80 x10*3/uL 0.47    Eosinophils Absolute      0.00 - 0.40 x10*3/uL 0.04    Basophils Absolute      0.00 - 0.10 x10*3/uL 0.03    GLUCOSE      74 - 99 mg/dL 77    SODIUM      136 - 145 mmol/L 140    POTASSIUM      3.5 - 5.3 mmol/L 4.3    CHLORIDE      98 - 107 mmol/L 102    Bicarbonate      21 - 32 mmol/L 31    Anion Gap      10 - 20 mmol/L 11    Blood Urea  Nitrogen      6 - 23 mg/dL 15    Creatinine      0.50 - 1.05 mg/dL 0.92    EGFR      >60 mL/min/1.73m*2 64    Calcium      8.6 - 10.3 mg/dL 9.7    Albumin      3.4 - 5.0 g/dL 4.1    Alkaline Phosphatase      33 - 136 U/L 23 (L)    Total Protein      6.4 - 8.2 g/dL 6.4    AST      9 - 39 U/L 17    Bilirubin Total      0.0 - 1.2 mg/dL 0.6    ALT      7 - 45 U/L 12    Color, Urine      Straw, Yellow  Yellow    Appearance, Urine      Clear  Clear    Specific Gravity, Urine      1.005 - 1.035  1.010    pH, Urine      5.0, 5.5, 6.0, 6.5, 7.0, 7.5, 8.0  6.0    Protein, Urine      NEGATIVE mg/dL NEGATIVE    Glucose, Urine      NEGATIVE mg/dL NEGATIVE    Blood, Urine      NEGATIVE  SMALL (1+) !    Ketones, Urine      NEGATIVE mg/dL NEGATIVE    Bilirubin, Urine      NEGATIVE  NEGATIVE    Urobilinogen, Urine      <2.0 mg/dL <2.0    Nitrite, Urine      NEGATIVE  NEGATIVE    Leukocyte Esterase, Urine      NEGATIVE  NEGATIVE    CHOLESTEROL      0 - 199 mg/dL 183    HDL CHOLESTEROL      mg/dL 64.6    Cholesterol/HDL Ratio 2.8    LDL Calculated      <=99 mg/dL 101 (H)    VLDL      0 - 40 mg/dL 18    TRIGLYCERIDES      0 - 149 mg/dL 88    Non HDL Cholesterol      0 - 149 mg/dL 118    WBC, Urine      1-5, NONE /HPF NONE    RBC, Urine      NONE, 1-2, 3-5 /HPF 1-2    Squamous Epithelial Cells, Urine      Reference range not established. /HPF 1-9 (SPARSE)    Bacteria, Urine      NONE SEEN /HPF 1+ !    Mucus, Urine      Reference range not established. /LPF 1+    Vitamin D, 25-Hydroxy, Total      30 - 100 ng/mL 49    Thyroid Stimulating Hormone      0.44 - 3.98 mIU/L 1.21    Vitamin B12      211 - 911 pg/mL 515         Review of Systems   All other systems reviewed and are negative.      BP Readings from Last 3 Encounters:   12/27/23 115/70   12/06/23 130/70   11/02/23 117/68      Wt Readings from Last 3 Encounters:   12/27/23 66.9 kg (147 lb 6.4 oz)   12/06/23 66.4 kg (146 lb 6.4 oz)   11/02/23 67.3 kg (148 lb 6 oz)      BMI:  "  Estimated body mass index is 23.79 kg/m² as calculated from the following:    Height as of 12/6/23: 1.676 m (5' 6\").    Weight as of this encounter: 66.9 kg (147 lb 6.4 oz).    Objective   Physical Exam  Constitutional:       General: She is not in acute distress.  HENT:      Head: Normocephalic and atraumatic.      Right Ear: Tympanic membrane and external ear normal.      Left Ear: Tympanic membrane and external ear normal.      Nose: Nose normal.      Mouth/Throat:      Mouth: Mucous membranes are moist.   Eyes:      Extraocular Movements: Extraocular movements intact.      Conjunctiva/sclera: Conjunctivae normal.   Cardiovascular:      Rate and Rhythm: Normal rate and regular rhythm.      Pulses: Normal pulses.   Pulmonary:      Effort: Pulmonary effort is normal.      Breath sounds: Normal breath sounds.   Abdominal:      General: Bowel sounds are normal.      Palpations: Abdomen is soft.   Musculoskeletal:         General: Normal range of motion.      Cervical back: Normal range of motion and neck supple.   Skin:     General: Skin is warm and dry.   Neurological:      General: No focal deficit present.      Mental Status: She is alert.   Psychiatric:         Mood and Affect: Mood normal.       "

## 2023-12-31 PROBLEM — H26.9 CATARACT OF BOTH EYES: Status: ACTIVE | Noted: 2023-12-31

## 2024-01-04 ENCOUNTER — PATIENT OUTREACH (OUTPATIENT)
Dept: PRIMARY CARE | Facility: CLINIC | Age: 79
End: 2024-01-04
Payer: COMMERCIAL

## 2024-01-04 DIAGNOSIS — F32.1 CURRENT MODERATE EPISODE OF MAJOR DEPRESSIVE DISORDER WITHOUT PRIOR EPISODE (MULTI): ICD-10-CM

## 2024-01-04 DIAGNOSIS — I48.0 PAROXYSMAL ATRIAL FIBRILLATION (MULTI): ICD-10-CM

## 2024-01-04 PROCEDURE — 99490 CHRNC CARE MGMT STAFF 1ST 20: CPT | Performed by: INTERNAL MEDICINE

## 2024-01-04 PROCEDURE — 99439 CHRNC CARE MGMT STAF EA ADDL: CPT | Performed by: INTERNAL MEDICINE

## 2024-01-04 NOTE — PROGRESS NOTES
If recent would think Lexapro is the cause  Dose was recently increased  Would reduce dose down to 10mg daily (if on 20mg as per last correspondence in [facility])    Looking at possible side effects in UTD:  Nervous system:  agitated depression, akathisia, delirium, delusion, hallucination, hyperactive behavior  melony myoclonus, nervousness,  nightmares, panic, paranoid ideation,  psychosis

## 2024-01-04 NOTE — PROGRESS NOTES
Patient has had 4 different episodes in which she is having either dreams or hallucinations in the middle of the night  Will wake up to her own self talking  And then she will see people in her bedroom or walking into/out of her room  Unsure if these are dreams or hallucinations    Scheduled to see Clinical psychologist 1/20/24    Issue with teeth that she is working on-   Biting her cheek really bad  Having to sleep sitting up or lying on her back  Which she hasn't been sleeping as well  Needs new mouth guard    Also, feeling a lot of pressure on herself  Going through a lot of papers  Does not feel she is properly cleaning her home

## 2024-01-05 NOTE — PROGRESS NOTES
Spoke with patient.  Currently she is taking 10mg daily since end of December.  She is going to consider decreasing dose or possibly weaning off completely.

## 2024-01-13 PROCEDURE — RXMED WILLOW AMBULATORY MEDICATION CHARGE

## 2024-01-15 ENCOUNTER — PATIENT OUTREACH (OUTPATIENT)
Dept: PRIMARY CARE | Facility: CLINIC | Age: 79
End: 2024-01-15
Payer: COMMERCIAL

## 2024-01-15 DIAGNOSIS — F32.1 CURRENT MODERATE EPISODE OF MAJOR DEPRESSIVE DISORDER WITHOUT PRIOR EPISODE (MULTI): ICD-10-CM

## 2024-01-15 DIAGNOSIS — F41.9 ANXIETY: ICD-10-CM

## 2024-01-15 DIAGNOSIS — I48.0 PAROXYSMAL ATRIAL FIBRILLATION (MULTI): ICD-10-CM

## 2024-01-15 RX ORDER — BUTYROSPERMUM PARKII(SHEA BUTTER), SIMMONDSIA CHINENSIS (JOJOBA) SEED OIL, ALOE BARBADENSIS LEAF EXTRACT .01; 1; 3.5 G/100G; G/100G; G/100G
250 LIQUID TOPICAL 2 TIMES DAILY
COMMUNITY
End: 2024-05-28 | Stop reason: WASHOUT

## 2024-01-15 NOTE — PROGRESS NOTES
Patient calling with questions about weaning off 5mg Lexapro  She is not sure she is able to cut them in 1/2  She will take them every other day for several days  Aware that it can take 2 weeks for it to fully leave her system    Restarted probiotic - Oropeza brand    Updated MyChart message preferences

## 2024-01-16 ENCOUNTER — PHARMACY VISIT (OUTPATIENT)
Dept: PHARMACY | Facility: CLINIC | Age: 79
End: 2024-01-16
Payer: MEDICARE

## 2024-01-24 ENCOUNTER — OFFICE VISIT (OUTPATIENT)
Dept: OTOLARYNGOLOGY | Facility: CLINIC | Age: 79
End: 2024-01-24
Payer: COMMERCIAL

## 2024-01-24 DIAGNOSIS — J39.2 THROAT IRRITATION: Primary | ICD-10-CM

## 2024-01-24 PROCEDURE — 1036F TOBACCO NON-USER: CPT | Performed by: OTOLARYNGOLOGY

## 2024-01-24 PROCEDURE — 1160F RVW MEDS BY RX/DR IN RCRD: CPT | Performed by: OTOLARYNGOLOGY

## 2024-01-24 PROCEDURE — 99203 OFFICE O/P NEW LOW 30 MIN: CPT | Performed by: OTOLARYNGOLOGY

## 2024-01-24 PROCEDURE — 1157F ADVNC CARE PLAN IN RCRD: CPT | Performed by: OTOLARYNGOLOGY

## 2024-01-24 PROCEDURE — 31575 DIAGNOSTIC LARYNGOSCOPY: CPT | Performed by: OTOLARYNGOLOGY

## 2024-01-24 NOTE — PROGRESS NOTES
Adelia Munson is a 78 y.o. year old female patient with Sleep Apnea and THROAT IRRITATION     Patient presents to the office today for assessment of throat.  Patient has known history of sleep apnea and is here today for complaints of throat irritation.  The patient denies history of postnasal drainage or reflux.  She denies difficulty with swallow.  All other ENT concerns are negative.    Review of Systems   All other systems reviewed and are negative.        Physical Exam:   General appearance: No acute distress. Normal facies. Symmetric facial movement. No gross lesions of the face are noted.  The external ear structures appear normal. The ear canals patent and the tympanic membranes are intact without evidence of air-fluid levels, retraction, or congenital defects.  Anterior rhinoscopy notes essentially a midline nasal septum. Examination is noted for normal healthy mucosal membranes without any evidence of lesions, polyps, or exudate. The tongue is normally mobile. There are no lesions on the gingiva, buccal, or oral mucosa. There are no oral cavity masses.  The neck is negative for mass lymphadenopathy. The trachea and parotid are clear. The thyroid bed is grossly unremarkable. The salivary gland structures are grossly unremarkable.  The laryngeal structures are noted for grossly normal appearance. The true vocal cords, the false focal cords, and the remaining structures including the epiglottis, piriform sinuses, and base of tongue are all grossly normal. There is no evidence of gross mass nodule, polyp, tumor or other defect.    Assessment/Plan   1.  Throat irritation  Patient seen in the office today for assessment of throat with complaints for irritation.  The patient at this time has unremarkable exam including mirror examination of the larynx.  The patient does not have postnasal drainage or reflux.  There examination of the larynx was unremarkable.  I favor observation and see us back as needed.    All  questions were answered in this regard accordingly.

## 2024-01-29 ENCOUNTER — PATIENT MESSAGE (OUTPATIENT)
Dept: PRIMARY CARE | Facility: CLINIC | Age: 79
End: 2024-01-29
Payer: COMMERCIAL

## 2024-01-29 DIAGNOSIS — I48.0 PAROXYSMAL ATRIAL FIBRILLATION (MULTI): ICD-10-CM

## 2024-01-29 DIAGNOSIS — I47.10 PAROXYSMAL SUPRAVENTRICULAR TACHYCARDIA (CMS-HCC): Primary | ICD-10-CM

## 2024-01-29 RX ORDER — METOPROLOL SUCCINATE 25 MG/1
25 TABLET, EXTENDED RELEASE ORAL DAILY
Qty: 90 TABLET | Refills: 3 | Status: SHIPPED | OUTPATIENT
Start: 2024-01-29 | End: 2024-02-22 | Stop reason: SDUPTHER

## 2024-01-29 NOTE — TELEPHONE ENCOUNTER
From: Adelia Munson  To: Gracie Tim MD  Sent: 1/29/2024 11:24 AM EST  Subject: Refill Metoprolol    I need a refill for the Metoprolol Succinate Er 24hr 25Mg Tab Oryz. After this week, I just have 5 pills left. The pharmacy is the [facility] on [address] in [place].    I've gotten my second month of the free Eliquis from the  pharmacy. Thank you for telling me about the program.    I plan on talking to Gia today or tomorrow.    Adelia Munson   Mary is a 57 year old who is being evaluated via a billable telephone visit.      What phone number would you like to be contacted at? [phone]   How would you like to obtain your AVS? Cesia  Phone call duration: 5 minutes    Ethan Jimenez    REASON FOR VISIT: Follow-up breast cancer     HISTORY OF PRESENT ILLNESS:  Josefina Bennett was self referred to our clinic for clinical trial recommendations for newly diagnosed stage II breast cancer. She had her last mammogram approximately a year prior. She did notice in early January 2015 that she was having some distortion of the right breast, and she went to see her gynecologist and had a mammogram performed. She had a screening mammogram performed on 01/15/2015. Breast tissue was heterogeneously dense, which might compromise the mammography. There was architectural distortion in the right breast approximately 11-12 o'clock position, zone 2, posterior depth, and a CC MLO spot compression was performed and an ultrasound was planned. The diagnostic mammogram from 01/28/2015 showed right breast architectural distortion centered at the 12 o'clock position, zone 2, suspicious for neoplasm. Breast tomosynthesis was used in the interpretation. Ultrasound findings included targeted ultrasound of the right upper breast demonstrating a band-like area of abnormal echogenicity between 11 and 12 o'clock position in the right breast at zone 2. This measures 4 cm transversely x 1.1 cm AP, and there was only a 1.5 cm measurement in the radial direction. There was blood flow suspicious for neoplasm at the 11 o'clock position in zone 2. There is a 1.4 cm hypoechoic nodule slightly  from the larger area of altered echogenicity that is also suspicious. A biopsy was performed. The biopsy showed infiltrating lobular carcinoma, grade 2, and a clip was placed at specimen [id number]. There were a few signet cells present. The tumor was ER-positive, WI low positive, and HER2 was negative  by immunohistochemistry. She subsequently underwent an MRI showing a tumor that was 4.8 x 1.7 x 3.2 cm in size. Overall, her clinical stage was T2 NX MX.       She was enrolled in the I-SPY-2 clinical trial, and qualified with high-risk MammaPrint score. She was randomized to ganetespib and paclitaxel, and she was treated with 12 cycles of treatment, and then started on AC on 05/26/2015. She developed intractable nausea and vomiting after the first cycle, which required hospitalization on the second cycle. She then developed Pneumocystis pneumonia, which resulted in intubation and a 2-week hospitalization during the course of AC. She was discharged on 07/02/2015, and decided she did not want to pursue any further chemotherapy. She had a right lumpectomy and sentinel lymph node procedure 08/12/2015. She had a positive margin, underwent a re-excision, and had a positive margin. Ultimately, she underwent a right mastectomy with clear margins. She began radiation treatment with Dr. Bill Chauhan, and completed radiation therapy on 12/04/2015. Pathologic stage was III-A, ypT3 N1a MX. Of concern, she had what could be considered an RCB3 tumor.  She started adjuvant letrozole on 1/28/16.     TREATMENT HISTORY:  A.  Neoadjuvant therapy on I SPY-2 with ganetespib and paclitaxel.  AC x 2 complicated by PCP pneumonia.    B.  Right lumpectomy, followed by right margin resections, followed by rigth mastectomy.   B.  Oophorectomy 10-16-15.   C.  Tamoxifen after 10-6-15.   D.  Radiation therapy. 5,040 cGy in 180 cGy/fraction to the chest wall and regional lymphatics followed by 720 cGy axillary lymph node boost and 1000 cGy mastectomy scar boost. Completed on 12/3/2015.  E.   Adjuvant letrozole begun 1-28-16.  Plan was to continue through 2026.  F.  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  A mix of TNBC and ER- HER2+.  Clinic conference consensus was for the modified Vandana with paclitaxel weekly to treat both clones. T-DXd could be a next regimen.  G.  Metastatic carcinoma is HER2 negative (score 1+) by immunohistochemistry.  She is also a candidate for T-DXd.  H.  Recurrent tumor with 22C3 antibody for PD-L1 and hold this in reserve for a Keynote 355 metastatic TNBC approach if the Vandana regimen does not control her metastatic disease.  Keynote 355 could also be an active regimen.     TELEPHONE VISIT     INTERVAL HISTORY:    Josefina Verma returns to clinic by phone visit.  She is doing quite well.  She is tolerating the VANDANA regimen without difficulty.  She denies pain, fatigue, depression or anxiety.  She has no evidence of neuropathy at this time.  She has had COVID vaccination x1.  I did recommend a booster.    She is eating a healthful diet.  She is exercising about 150 minutes a week.  She is taking vitamin D3 and calcium.     REVIEW OF SYSTEMS:  A 10-point review of systems is negative.     PHYSICAL EXAMINATION:  None today.  Mood normal.      LABORATORY DATA:    CBC showed WBC 9.0 hemoglobin 11.3 platelets 460,000.  Absolute neutrophil count 4400.  CMP normal.  CA 27-29 continues to decline and is 388.  CEA continues to decline and is 354.     IMAGING:    None today     ASSESSMENT AND PLAN:   1.  Mary Bennett is a 57-year-old woman with a history of a clinical stage II right breast cancer, ER positive, AZ positive, HER2 negative by immunohistochemistry.  She had a lobular histology.  She was on the I-SPY 2 clinical trial and was randomized to ganetespib and paclitaxel, then went on to AC.  She had intractable nausea and vomiting the first cycle of AC, and the second cycle of AC was complicated by Pneumocystis pneumonia requiring intubation and a 2-week hospitalization.  She did not complete the AC treatment.  She did undergo a  right lumpectomy and sentinel node procedure.  She had a positive margin and underwent resection.  She still had a positive margin.  She underwent a right lumpectomy with clear margins.  She completed radiation in 12/2015.  Pathologic stage was IIIA  [id number] and of concern, she had an RCB3 histology meaning significant risk of recurrence of her breast cancer during the 5-year period following primary therapy.  There were 2 lymph nodes involved with mammary carcinoma with extracapsular extension.  She went on to have radiation therapy and then began hormonal therapy with tamoxifen in October 2015.  She had oophorectomy and started letrozole in January 2016.  She continued on letrozole for adjuvant hormonal therapy at the time of diagnosis of recurrence.   2.  Breast conference recommended starting with HER2 directed therapy.  Biphenotypic cancer with majority TNBC component and a HER2 component.  I discussed the 22C3 PD-L1 CPS of 40 while 20% of the tumor is HER2+ by FISH and the overall ASCO  CAP assignment is negative for the bulk tumor with the qualifier that there is a significant HER2 clonal component that would be best treated with HER2 directed therapy.  The Tumor Board consensus was to continue with HER2 directed therapy to treat the new and HER2+ clone till best response and then change over to Keynote 355 with pembrolizumab and chemotherapy.  Pembrolizumab and gem/carbo or pembro and Abraxane could be potential options. We continuing with the recommended regimen of Vandana with every 3 week pertuzumab and trastuzumab but weekly paclitaxel because it is better tolerated than every 3 week docetaxel.  The weekly paclitaxel may be more effective vs the TNBC component.  The regimen will be load of trastuzumab of 8 mg/kg followed by 4 mg/kg every 3 weeks and then load of pertuzumab of 840 mg and 420 every 3 weeks and weekly paclitaxel.    3.  She is tolerating treatment well.  We are continuing with the  TERESA regimen with weekly paclitaxel in place of docetaxel.  The plan is for her to have a PET/CT 06/27 and then follow up with me to assess response to therapy.  We will add zoledronic acid because of bone metastases and we will send an information sheet on Zometa.  The plan I discussed with Josefina Verma is to continue the TERESA regimen with paclitaxel weekly for a total of 4 months and then stop the paclitaxel and continue with pertuzumab and trastuzumab alone.  All of her questions were answered.  4.  No hormonal therapy because the recurrent tumor is ER negative.   5.  Restaging with PET CT, brain MRI in about 1 month.    6.  Follow up plan:  6-3 paclitaxel and Zometa, CBC, CMP.  6-10 follow up with Sandra Coughlin with pertuzumab, trastuzumab, paclitaxel CBC, CMP, CA27.29 and CEA.  Paclitaxel 6-17 and 6-24 with CBC, CMP no provider visit.  PET CT 6-27.  6-30 follow up with me with pertuzumab, trastuzumab, paclitaxel, Zometa CBC, CMP, CA27.29 and CEA.     Thank you for allowing us to continue to participate in Josefina Bennett' care.      Sincerely,       Evangelista Meza MD  Professor  [facility]  [phone]        I spent 5 minutes with the patient more than 50% of which was in counseling and coordination of care and then 30 minutes reviewing results from her inpatient hospitalization, 40 minutes total time.

## 2024-01-31 ENCOUNTER — TELEPHONE (OUTPATIENT)
Dept: OTOLARYNGOLOGY | Facility: CLINIC | Age: 79
End: 2024-01-31
Payer: COMMERCIAL

## 2024-01-31 NOTE — TELEPHONE ENCOUNTER
Patient called and is returning your phone call from yesterday. She mentioned she has a couple surgeries coming up and would not be ready to have the MBS done most likely around April. Patient just wanted to make sure that this was okay? And if it is, can she just give us a call when she is ready so you can put the order in. Thank you.

## 2024-02-06 ENCOUNTER — PATIENT OUTREACH (OUTPATIENT)
Dept: PRIMARY CARE | Facility: CLINIC | Age: 79
End: 2024-02-06
Payer: COMMERCIAL

## 2024-02-06 DIAGNOSIS — I47.10 PAROXYSMAL SUPRAVENTRICULAR TACHYCARDIA (CMS-HCC): ICD-10-CM

## 2024-02-06 DIAGNOSIS — I48.0 PAROXYSMAL ATRIAL FIBRILLATION (MULTI): ICD-10-CM

## 2024-02-06 DIAGNOSIS — F32.1 CURRENT MODERATE EPISODE OF MAJOR DEPRESSIVE DISORDER WITHOUT PRIOR EPISODE (MULTI): ICD-10-CM

## 2024-02-06 PROCEDURE — 99490 CHRNC CARE MGMT STAFF 1ST 20: CPT | Performed by: INTERNAL MEDICINE

## 2024-02-06 NOTE — PROGRESS NOTES
Tomorrow morning- Cataract repair on right eye  L eye scheduled for 2/28    Restarted Oropeza Probiotic on 1/13    Weaning off lexapro-  Was down to 5mg every other day  On 1/27 middle of night  Woke up to use bathroom  Could feel/hear heart thumping  Could hear clocks ticking loudly  L hand and arm shaking  At this point had gone 3 days without lexapro  Took 5mg tablet   Bad headache when she got back to bed  Very warm (no fever)    Since then she was able to get pill cutter  Has been taking 2.5mg every other day  For past week  Ideally would like to stop Lexapro completely  Discussed that this will really take a full 3-4 weeks  To clear her system     Overall feels better since lowering Lexapro  -her sleep has improved  -Less teeth clenching  -Side sleeping again  -Feels more energetic (less lethargic)    Neighbor situation has improved a bit  Supposed to have appointment psychologist  Cancelled due to weather (very cold/snow)    Scheduled with Dr. Lindsay 3/1- now end of March  Due to cataract repair    1/29 -noticed 2 small red bumps  On Lower abdomen (Left of umbilicus)  Slightly itchy- Spider bite?  Seems to be doing better  Will watch

## 2024-02-12 PROCEDURE — RXMED WILLOW AMBULATORY MEDICATION CHARGE

## 2024-02-15 ENCOUNTER — PHARMACY VISIT (OUTPATIENT)
Dept: PHARMACY | Facility: CLINIC | Age: 79
End: 2024-02-15
Payer: MEDICARE

## 2024-02-22 ENCOUNTER — TELEPHONE (OUTPATIENT)
Dept: NEUROLOGY | Facility: CLINIC | Age: 79
End: 2024-02-22

## 2024-02-22 ENCOUNTER — OFFICE VISIT (OUTPATIENT)
Dept: NEUROLOGY | Facility: CLINIC | Age: 79
End: 2024-02-22
Payer: COMMERCIAL

## 2024-02-22 VITALS
WEIGHT: 151.8 LBS | HEART RATE: 71 BPM | BODY MASS INDEX: 24.4 KG/M2 | HEIGHT: 66 IN | DIASTOLIC BLOOD PRESSURE: 80 MMHG | TEMPERATURE: 96.9 F | SYSTOLIC BLOOD PRESSURE: 110 MMHG

## 2024-02-22 DIAGNOSIS — R51.9 NONINTRACTABLE HEADACHE, UNSPECIFIED CHRONICITY PATTERN, UNSPECIFIED HEADACHE TYPE: ICD-10-CM

## 2024-02-22 DIAGNOSIS — G25.0 ESSENTIAL TREMOR: Primary | ICD-10-CM

## 2024-02-22 DIAGNOSIS — K21.9 CHRONIC GERD: ICD-10-CM

## 2024-02-22 DIAGNOSIS — I47.10 PAROXYSMAL SUPRAVENTRICULAR TACHYCARDIA (CMS-HCC): ICD-10-CM

## 2024-02-22 DIAGNOSIS — I48.0 PAROXYSMAL ATRIAL FIBRILLATION (MULTI): ICD-10-CM

## 2024-02-22 PROCEDURE — 1159F MED LIST DOCD IN RCRD: CPT | Performed by: PSYCHIATRY & NEUROLOGY

## 2024-02-22 PROCEDURE — 1036F TOBACCO NON-USER: CPT | Performed by: PSYCHIATRY & NEUROLOGY

## 2024-02-22 PROCEDURE — 1157F ADVNC CARE PLAN IN RCRD: CPT | Performed by: PSYCHIATRY & NEUROLOGY

## 2024-02-22 PROCEDURE — 99204 OFFICE O/P NEW MOD 45 MIN: CPT | Performed by: PSYCHIATRY & NEUROLOGY

## 2024-02-22 RX ORDER — METOPROLOL SUCCINATE 50 MG/1
50 TABLET, EXTENDED RELEASE ORAL DAILY
Qty: 30 TABLET | Refills: 6 | Status: SHIPPED | OUTPATIENT
Start: 2024-02-22 | End: 2024-05-06 | Stop reason: SDUPTHER

## 2024-02-22 ASSESSMENT — LIFESTYLE VARIABLES
AUDIT-C TOTAL SCORE: 1
SKIP TO QUESTIONS 9-10: 1
HOW OFTEN DO YOU HAVE A DRINK CONTAINING ALCOHOL: MONTHLY OR LESS
HOW MANY STANDARD DRINKS CONTAINING ALCOHOL DO YOU HAVE ON A TYPICAL DAY: 1 OR 2
HOW OFTEN DO YOU HAVE SIX OR MORE DRINKS ON ONE OCCASION: NEVER

## 2024-02-22 ASSESSMENT — PATIENT HEALTH QUESTIONNAIRE - PHQ9
SUM OF ALL RESPONSES TO PHQ9 QUESTIONS 1 & 2: 0
1. LITTLE INTEREST OR PLEASURE IN DOING THINGS: NOT AT ALL
2. FEELING DOWN, DEPRESSED OR HOPELESS: NOT AT ALL

## 2024-02-22 NOTE — PROGRESS NOTES
Consulting Physician: Dr. Tim    Chief Complaint: Headaches, Tremors    History Of Present Illness  Adelia Munson is a 78 y.o. female presenting with headaches and tremors.    At the beginning of November, she was having frequent headaches.  She attributes this to being on Lexapro.  During the weaning off of the medication, the headaches dissipated.  Her last headache was 2 weeks ago.     She also has tremors for atleast 10 years.  She has a strong family history (mother, 3 siblings, and maternal grandmother).  She describes shaking of her left hand primarily when using her hand.  It is difficult to write or eat due to the tremor.  She denies any significant tremors on her right hand.  She also denies any resting hand tremor.  On October 2016, she developed head tremors (side to side).  She does note that alcohol does lessen her tremors.  She is currently on Metoprolol for hypertension.       Past Medical History  Past Medical History:   Diagnosis Date    Abnormal MRI, musculoskeletal     MRI hip 6/22: 1. Atrophy of the left gluteus medius and minimus consistent with chronic tendon tears with associated trochanteric bursitis. 2. Mild osteoarthrosis of the left hip joint and small left joint effusion. 3. Mild feathery edema involving the left rectus femoris and adductor longus with adjacent intermuscular edema suggestive of mild component of muscular injury.    Abnormal MRI, musculoskeletal     pt 2: MRI Tib/Fib 1/2020: Benign appearing area in the proximal tibial metaphysis, without suspicious features, likely postsurgical defect    Abnormal x-ray of extremity     XR Tib/fib 5/7/21: IMPRESSION: Cortical based lucency with irregularity in the posterior cortex of the proximal tibial diaphysis. This may represent a prior fibrous lesions. Comparison with prior studies is advised. Followup in 6 months advised for stability if no priors available    Abnormal x-ray of extremity     pt 2: XR Knee: lytic lesion proximal  third tibia, knee compartments normal (MRI done that shows postsurgical lesion) 1/16/21: Benign appearing areas in the proximal tibial metaphysis, without suspicious features, likley postsurgical defect give pts hx    H/O bone density study 12/20/2021    Ten Year Major Osteoporotic Fracture Risk: 22.52% Ten Year Hip Fracture Risk: 13.6% TScore: 2.2 6/18: THE LOWEST TSCORE IS 1.7, FRAX 10/2%    H/O cervical spine x-ray 04/2019    Mild decrease in the intervertebral disc space at the C56 and C6C7. Moderate facet arthropathy with uncovertebral hypertrophy at C4C5 through C6C7    H/O chest x-ray 08/2014    normal 10/22/21: Mild discogenic degenerative changes are seen throughout the thoracic spine. 7/22: normal    H/O CT scan of abdomen     2/13: liver and kidney cysts 7/22: Stomach is decompressed, slightly limiting its evaluation with equivocal mucosal hyperemia and submucosal edema, somewhat suspicious for acute gastritis. There is mild prominence of mucosal folds and subjective mucosal hyperemia in small bowel, with mesenteric edema, suspicious for infectious/inflammatory enteritis.    H/O CT scan of abdomen     pt 2: Colon is mostly decompressed, with small volume of stool in the proximal colon. Mucosal hyperemia in the colon, most pronounced involving the descending and rectosigmoid colon, with mesocolonic and mesorectal fat stranding, suspicious for acute proctocolitis. No bowel dilation. Mild pelvic ascites, probably reactive in etiology. No pneumoperitoneum or loculated intraperitoneal collection.    H/O CT scan of abdomen     pt 3: Small hepatic and renal cortical cysts. Thoracolumbar scoliosis and lumbar spine degenerative changes. Repeat CT 7/21/22: Imp approved hyperemia of small bowel and distal colon. Mild generalized mesenteric edema persists with small volume fluid which    H/O CT scan of brain 11/2021    There are scattered dural ossifications most prominently along the left frontal lobe where there is  a 34 mm thick 15 mm diameter ossification. No associated soft tissue mass to suggest meningioma is noted.    H/O CT scan of chest 08/2020    CT: NO PE    H/O echocardiogram 04/2005    Trivial MR with no mitral valve prolapse. Normal LV fx, neg for ischemia 11/20: 1.left ventricular systolic function normal,60-65% est ejection fraction. 2. Poorly visualized anatomical struct due to subopt image. 3. Spectral Doppler shows an impaired relaxation pattern of left ventricular diastolic filling. 4. RVSP within normal limits. 5. No significant valvular pathology seen. 6. No prior echo    H/O magnetic resonance imaging of brain and brain stem 11/24/2023    Mild white-matter changes are nonspecific but most likely represent small-vessel ischemic disease in a patient of this age and also involve the gonzalo. No evidence of acute ischemic injury or intracranial mass effect.    H/O spine x-ray 06/22/2023    XR Sacrum: Acute angulation at the sacrococcygeal junction which may reflect a prior injury/fracture.    H/O x-ray of lumbar spine 06/22/2023    There is mild anterolisthesis of L3 on L4 and L4 on L5. There is mild multilevel endplate sclerosis and osteophytosis. There is moderate facet disease lower lumbar spine as well    History of colitis     CT 7/22 Stomach is decompressed, slightly limiting its evaluation with equivocal mucosal hyperemia and submucosal edema, somewhat suspicious for acute gastritis. There is mild prominence of mucosal folds and subjective mucosal hyperemia in small bowel, with mesenteric edema, suspicious for infectious/inflammatory enteritis.    History of colitis     pt 2: Colon is mostly decompressed, with small volume of stool in the proximal colon. Mucosal hyperemia in the colon, most pronounced involving the descending and rectosigmoid colon, with m    History of PFTs 11/2022    DLCO substantially reduced, air trapping also    Holter monitor, abnormal 10/2022    Sinus karen/tachycardia. 93860,  average 74. 741 PVCs (0.05% burden), 8565 PSVCs (0.58%), 23 occurrences of PSVT, longest 34 beats, fastest 162 1 reported event 8/22: HR 97360, average 94. Rare PACs, PVCs, no AF (40hours) 7 events recorded, all autotriggered       Surgical History  Past Surgical History:   Procedure Laterality Date    APPENDECTOMY      BREAST BIOPSY      Biopsy Breast Percutaneous Needle Core, 10/18: Right breast mass, needle localized excisional biopsy (A)  Proliferative epithelial changes including radial sclerosing lesions, sclerosing adenosis and usual ductal hyperplasia (please see comment)  Microcalcifications present in proliferative epithelial changes and unremarkable lobules    BREAST BIOPSY  10/2018    sclerosing lesions, sclerosing adenosis and usual ductal hyperplasia 1. Right breast mass, needle localized excisional biopsy (A)  Proliferative epithelial changes including radial sclerosing lesions, sclerosing adenosis and usual ductal hyperplasia. Microcalcifications present in proliferative epithelial changes and unremarkable lobules/Biopsy clip and biopsy site reparative changes identified    BREAST BIOPSY      pt 2:2. Right breast, new medial margin, excision (B)  Fibrofatty breast tissue 3. Left breast, needle localized excisional biopsy (C)  Proliferative epithelial changes including radial sclerosing lesions, sclerosing adenosis and usual ductal hyperplasia (please see comment)  Microcalcifications present in proliferative epithelial changes and unremarkable lobules    BREAST BIOPSY      pt 3: Biopsy clip and biopsy site reparative changes identified 4. Left breast, new medial margin, excision (D)  Proliferative epithelial changes    CARDIAC CATHETERIZATION  09/14/2020 9/13: normal POSTOPERATIVE DIAGNOSES: 1. Normal LEFT MAIN. 2. Normal LAD. 3. Normal CIRCUMFLEX. 4. Normal RIGHT CORONARY ARTERY. 5. Normal left ventricular function, ejection fraction of 70%.    COLONOSCOPY  10/05/2021    1/11: Hemorrhoids; normal  (8); diverticulosis, biopsy: (-) 9/2019: Diverticulosis, polyp:  Ascending colon, polypectomy (C) - Fragments of tubular adenoma.    CYSTOSCOPY  10/05/2021    6/21: Cystoscopy Findings: atrophic vaginitis.  Cystoscopy today reveals a urethral stricture dilated to 20 [place] with minimal pain. Once inside the bladder there is severe erythema in the trigone. Mild trabeculations. No evidence of stones or tumors. Flow rate 9 cc/s, total volume 172 cc, PVR 61 cc.    ESOPHAGOGASTRODUODENOSCOPY  10/05/2021    6/21: Cystoscopy Findings: atrophic vaginitis.  Cystoscopy today reveals a urethral stricture dilated to 20 [place] with minimal pain. Once inside the bladder there is severe erythema in the trigone. Mild trabeculations. No evidence of stones or tumors. Flow rate 9 cc/s, total volume 172 cc, PVR 61 cc.    FLEXOR TENDON OF FOREARM / WRIST REPAIR      OTHER SURGICAL HISTORY      Adjacent Tissue Transfer    POLYSOMNOGRAPHY  06/14/2023    PSG: Mild SHIMA, O2  90%, AHI 8.4. 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 She has been exposed to tobacco smoke. She has never used smokeless tobacco. She reports current alcohol use. She reports that she does not use drugs.     Allergies  Amoxicillin-pot clavulanate, Celecoxib, Diazepam, Enoxaparin, Gabapentin, Meloxicam, Mometasone, Naproxen, Nitrate analogues, Nitrofurantoin  "macrocrystal, Piroxicam, Propranolol, Ranitidine hcl, Amitriptyline, and Medrol [methylprednisolone]    Medications    Current Outpatient Medications:     apixaban (Eliquis) 5 mg tablet, Take 1 tablet (5 mg) by mouth 2 times a day., Disp: 60 tablet, Rfl: 3    calcium carbonate-vitamin D3 600 mg-20 mcg (800 unit) tablet, Take 2 tablets by mouth once daily., Disp: , Rfl:     escitalopram (Lexapro) 5 mg tablet, Take 1 tablet (5 mg) by mouth once daily., Disp: 30 tablet, Rfl: 0    esomeprazole (NexIUM) 20 mg DR capsule, TAKE ONE CAPSULE BY MOUTH DAILY, Disp: 90 capsule, Rfl: 0    metoprolol succinate XL (Toprol-XL) 25 mg 24 hr tablet, Take 1 tablet (25 mg) by mouth once daily., Disp: 90 tablet, Rfl: 3    multivitamin with minerals iron-free (Multivitamin 50 Plus), Take 1 tablet by mouth once daily., Disp: , Rfl:     saccharomyces boulardii (Florastor) 250 mg capsule, Take 1 capsule (250 mg) by mouth 2 times a day., Disp: , Rfl:       Last Recorded Vitals   Blood pressure 110/80, pulse 71, temperature 36.1 °C (96.9 °F), temperature source Temporal, height 1.676 m (5' 6\"), weight 68.9 kg (151 lb 12.8 oz).    Objective:  Gen: NAD  Neuro:  --HIF: A&O X 3, repetition and naming intact  --CN:  PERRLA, EOMI, VFF, no visible facial asymmetry, facial sensation intact, no tongue or palatal deviation, SCM intact  --Motor: Moves all 4 extremities equally; no focal deficits; side to side head tremor; bilateral (left>right) postural hand tremor which worsens with writing  --Sensory: Intact to light touch, intact to pinprick  --Reflex: 2+ symmetric, toes down  --Cerebellum: FTN and HTS intact  --Gait: Normal, narrow based.  Toe and Heal Walking Intact.  Tandem Intact    Relevant Results  Lab Results   Component Value Date    WBC 4.5 10/16/2023    HGB 12.9 10/16/2023    HCT 39.4 10/16/2023    MCV 92 10/16/2023     10/16/2023       Lab Results   Component Value Date    GLUCOSE 77 10/16/2023    CALCIUM 9.7 10/16/2023     " "10/16/2023    K 4.3 10/16/2023    CO2 31 10/16/2023     10/16/2023    BUN 15 10/16/2023    CREATININE 0.92 10/16/2023       No results found for: \"HGBA1C\"    Lab Results   Component Value Date    CHOL 183 10/16/2023    CHOL 195 09/12/2022    CHOL 197 09/13/2021     Lab Results   Component Value Date    HDL 64.6 10/16/2023    HDL 61.0 09/12/2022    HDL 74.0 09/13/2021     Lab Results   Component Value Date    LDLCALC 101 (H) 10/16/2023     Lab Results   Component Value Date    TRIG 88 10/16/2023    TRIG 150 (H) 09/12/2022    TRIG 106 09/13/2021     No components found for: \"CHOLHDL\"    MRI Brain (I personally reviewed the images/tracings with the following interpretation)  No evidence of acute stroke  No mass lesion    ESR, CRP     Assessment:   Essential Tremor  - she has a longstanding history of ET affecting her left hand and head  - reviewed treatment options including beta-blockers, primidone (interacts with Eliquis), or topamax  - she is already on metoprolol XL 25 mg/day  - will discuss with her PCP and cardiologist about titrating Metoprolol up  - if we're unable to, then I will consider adding Topamax    2.  Headaches  - improved after weaning off Lexapro  - reviewed imaging and labs - both are unremarkable    Follow up in 4 months        Lalo Barone MD  [facility]  Department of Neurology      A copy of this note was sent to the referring provider.    "

## 2024-02-22 NOTE — TELEPHONE ENCOUNTER
I called the patient and left a VM.  Recommend increasing Metoprolol XL to 50 mg/day.  Follow up with Dr. Tim in 3-4 weeks  Follow up with me in July 2024.

## 2024-02-22 NOTE — TELEPHONE ENCOUNTER
----- Message from Gracie Tim MD sent at 2/22/2024 11:27 AM EST -----  Regarding: RE: re: [facility] Patient  Yes  Just have her schedule f/up with us in 3-4 weeks  ----- Message -----  From: Lalo Barone MD  Sent: 2/22/2024  11:26 AM EST  To: Stevenson Crowell MD; Gracie Tim MD  Subject: re: [facility] Patient                               Hello  -  I wanted to touch base with you guys regarding our mutual patient.    I saw her today for Essential Tremor.      She is currently on Metoprolol ER 25 mg/day.  Beta blockers are a treatment for essential tremor.  Are you okay with me increasing the dose to 50 mg/day?    Thanks  MK

## 2024-03-13 PROCEDURE — RXMED WILLOW AMBULATORY MEDICATION CHARGE

## 2024-03-14 ENCOUNTER — PATIENT OUTREACH (OUTPATIENT)
Dept: PRIMARY CARE | Facility: CLINIC | Age: 79
End: 2024-03-14
Payer: COMMERCIAL

## 2024-03-14 DIAGNOSIS — I47.10 PAROXYSMAL SUPRAVENTRICULAR TACHYCARDIA (CMS-HCC): ICD-10-CM

## 2024-03-14 DIAGNOSIS — I48.0 PAROXYSMAL ATRIAL FIBRILLATION (MULTI): ICD-10-CM

## 2024-03-14 DIAGNOSIS — F32.1 CURRENT MODERATE EPISODE OF MAJOR DEPRESSIVE DISORDER WITHOUT PRIOR EPISODE (MULTI): ICD-10-CM

## 2024-03-14 PROCEDURE — 99490 CHRNC CARE MGMT STAFF 1ST 20: CPT | Performed by: INTERNAL MEDICINE

## 2024-03-14 NOTE — PROGRESS NOTES
Saw psychologist on 2/22  Has stopped lexapro  -suggested having more quiet time   (Puzzles, reading, adult coloring book)    Saw Dr. Barone on 2/22  Discussed increasing metoprolol  To reduce tremors  She is unsure about doing this  She would prefer to finish drops   After cataract repairs  Dr. Tim recommended she keep track of daily bps    2 spots on lower abdomen  First noticed end of January  Spider bites?  Got bigger and worse but now looking better    Scheduled with Dr. Lindsay at [facility]  Goldish/orange mucus in bowel movement  Large amounts of gas  Noticed a lot of improvement since stopping Lexapro  Jaw clenching & cough at night has also lessened   Feels more energetic  Going to bed at 10:30 and waking at 7:30      CHART REVIEW-   2/22 Dr. Barone  Assessment:   Essential Tremor  - she has a longstanding history of ET affecting her left hand and head  - reviewed treatment options including beta-blockers, primidone (interacts with Eliquis), or topamax  - she is already on metoprolol XL 25 mg/day  - will discuss with her PCP and cardiologist about titrating Metoprolol up  - if we're unable to, then I will consider adding Topamax     2.  Headaches  - improved after weaning off Lexapro  - reviewed imaging and labs - both are unremarkable     Follow up in 4 months

## 2024-03-15 ENCOUNTER — PHARMACY VISIT (OUTPATIENT)
Dept: PHARMACY | Facility: CLINIC | Age: 79
End: 2024-03-15
Payer: MEDICARE

## 2024-03-17 ENCOUNTER — PATIENT MESSAGE (OUTPATIENT)
Dept: PRIMARY CARE | Facility: CLINIC | Age: 79
End: 2024-03-17
Payer: COMMERCIAL

## 2024-03-17 DIAGNOSIS — L98.9 CHANGING SKIN LESION: Primary | ICD-10-CM

## 2024-03-18 NOTE — PATIENT COMMUNICATION
Relayed to patient referral was placed. Patient verbalized understanding. Let patient know to call back if assistance with scheduling is needed

## 2024-03-27 DIAGNOSIS — R92.8 ABNORMAL MAMMOGRAM OF LEFT BREAST: Primary | ICD-10-CM

## 2024-03-27 NOTE — Clinical Note
See note. She will likely need to  order or to mail the ultrasound order to her so she can go to [facility]

## 2024-03-27 NOTE — PROGRESS NOTES
3/26/2024 F [facility]  Mammogram Screening w/ skip    Focal asymmetry in left breast in indeterminate. Additional view with possible ultrasound are recommended    Ultrasound breast ordered.

## 2024-03-28 ENCOUNTER — TELEPHONE (OUTPATIENT)
Dept: PRIMARY CARE | Facility: CLINIC | Age: 79
End: 2024-03-28
Payer: COMMERCIAL

## 2024-03-28 NOTE — TELEPHONE ENCOUNTER
Pt called for bone density results   Osteopenia dx  Per mason herndon   3-4 calciums a day  weight bearing exercises- walking, yoga   vit D with meal   Bone density in 2 years

## 2024-03-29 ENCOUNTER — TELEPHONE (OUTPATIENT)
Dept: PRIMARY CARE | Facility: CLINIC | Age: 79
End: 2024-03-29
Payer: COMMERCIAL

## 2024-03-29 NOTE — TELEPHONE ENCOUNTER
Pt called in and left VM regarding referral for US of left Breast. Pt was unsure if order was correct an had questions regarding what she picked up. I called Pt back and spoke with her. She was unaware that US on the order meant Ultrasound . Nothing more needed at this time.

## 2024-04-07 ENCOUNTER — PATIENT MESSAGE (OUTPATIENT)
Dept: NEUROLOGY | Facility: CLINIC | Age: 79
End: 2024-04-07
Payer: COMMERCIAL

## 2024-04-08 DIAGNOSIS — I47.10 PAROXYSMAL SUPRAVENTRICULAR TACHYCARDIA (CMS-HCC): ICD-10-CM

## 2024-04-08 PROCEDURE — RXMED WILLOW AMBULATORY MEDICATION CHARGE

## 2024-04-10 ENCOUNTER — PHARMACY VISIT (OUTPATIENT)
Dept: PHARMACY | Facility: CLINIC | Age: 79
End: 2024-04-10
Payer: MEDICARE

## 2024-04-11 ENCOUNTER — PATIENT OUTREACH (OUTPATIENT)
Dept: PRIMARY CARE | Facility: CLINIC | Age: 79
End: 2024-04-11
Payer: COMMERCIAL

## 2024-04-11 DIAGNOSIS — I47.10 PAROXYSMAL SUPRAVENTRICULAR TACHYCARDIA (CMS-HCC): ICD-10-CM

## 2024-04-11 DIAGNOSIS — N18.2 CKD (CHRONIC KIDNEY DISEASE) STAGE 2, GFR 60-89 ML/MIN: ICD-10-CM

## 2024-04-11 DIAGNOSIS — I48.0 PAROXYSMAL ATRIAL FIBRILLATION (MULTI): ICD-10-CM

## 2024-04-11 PROCEDURE — 99490 CHRNC CARE MGMT STAFF 1ST 20: CPT | Performed by: INTERNAL MEDICINE

## 2024-04-11 NOTE — PROGRESS NOTES
Recovering well from cataract procedure  Now has new glasses    BMD discussed results  Mammogram results discussed     Copied from 3/27/24   There is a focal asymmetry in the left breast at 1 o'clock posterior depth.  Finding is best noted on tomographic CC slice 23 and MLO slice 22.   No other significant masses, calcifications, or other findings are seen in either breast.     Scheduled diagnostic/ultrasound 5/15/24    Saw Dr. Lindsay for RLQ pain  Copied from 3/29  Assessment: This is an elderly woman with diverticulosis of the colon but no evidence of diverticulitis. She is giving a good description for medication induced constipation now transforming into obstipation. I suspect her current symptoms are due to a large amount of solid stool burden on the right side. Will get a KUB to document this. She will most likely do the 4-day cleanout. After that we will start her on a regimen of fiber and MiraLAX to prevent this from happening again. All of her questions were answered to her satisfaction. Will get blood work in addition to the KUB to make sure were not missing anything.

## 2024-05-06 ENCOUNTER — PATIENT MESSAGE (OUTPATIENT)
Dept: PRIMARY CARE | Facility: CLINIC | Age: 79
End: 2024-05-06
Payer: COMMERCIAL

## 2024-05-06 DIAGNOSIS — I47.10 PAROXYSMAL SUPRAVENTRICULAR TACHYCARDIA (CMS-HCC): ICD-10-CM

## 2024-05-06 DIAGNOSIS — I48.0 PAROXYSMAL ATRIAL FIBRILLATION (MULTI): ICD-10-CM

## 2024-05-06 DIAGNOSIS — K21.9 CHRONIC GERD: ICD-10-CM

## 2024-05-06 RX ORDER — HYDROGEN PEROXIDE 3 %
20 SOLUTION, NON-ORAL MISCELLANEOUS DAILY
Qty: 90 CAPSULE | Refills: 3 | Status: SHIPPED | OUTPATIENT
Start: 2024-05-06

## 2024-05-06 RX ORDER — METOPROLOL SUCCINATE 25 MG/1
25 TABLET, EXTENDED RELEASE ORAL DAILY
Qty: 30 TABLET | Refills: 5 | Status: SHIPPED | OUTPATIENT
Start: 2024-05-06 | End: 2024-11-02

## 2024-05-08 PROCEDURE — RXMED WILLOW AMBULATORY MEDICATION CHARGE

## 2024-05-13 ENCOUNTER — PHARMACY VISIT (OUTPATIENT)
Dept: PHARMACY | Facility: CLINIC | Age: 79
End: 2024-05-13
Payer: MEDICARE

## 2024-05-14 ENCOUNTER — PATIENT OUTREACH (OUTPATIENT)
Dept: PRIMARY CARE | Facility: CLINIC | Age: 79
End: 2024-05-14
Payer: COMMERCIAL

## 2024-05-14 DIAGNOSIS — I47.10 PAROXYSMAL SUPRAVENTRICULAR TACHYCARDIA (CMS-HCC): ICD-10-CM

## 2024-05-14 DIAGNOSIS — M25.552 PAIN OF LEFT HIP: ICD-10-CM

## 2024-05-14 DIAGNOSIS — I48.92 ATRIAL FLUTTER, UNSPECIFIED TYPE (MULTI): ICD-10-CM

## 2024-05-14 DIAGNOSIS — N18.2 CKD (CHRONIC KIDNEY DISEASE) STAGE 2, GFR 60-89 ML/MIN: ICD-10-CM

## 2024-05-14 PROCEDURE — 99490 CHRNC CARE MGMT STAFF 1ST 20: CPT | Performed by: INTERNAL MEDICINE

## 2024-05-14 PROCEDURE — 99439 CHRNC CARE MGMT STAF EA ADDL: CPT | Performed by: INTERNAL MEDICINE

## 2024-05-14 NOTE — PROGRESS NOTES
Saw dermatology    Supposed to be doing daily bowel protocol  Continues to have RLQ pain   tender to external pressure  She thinks that the benefiber may be causing her to be bloated   Considering eliminating benefiber and just taking miralax  Has follow up with Dr. Lindsay on 7/19    Having some mild aches and pains  Not sleeping well at night  Lays on L side - hip pain  Tendonitis in lower leg  L breast-has mole is painful  Laying on R side has aches/pains  Then finds she gets anxious and irritable    We discussed seeing ortho for hip pain  She is open to this   And would like Dr. Tim's recommendations    Sees Dr. Burnett on 5/28  Will discuss metoprolol and eliquis doses with him  Will determine if she is in sinus rhythm or a. flutter

## 2024-05-15 DIAGNOSIS — R92.8 ABNORMAL MAMMOGRAM: Primary | ICD-10-CM

## 2024-05-15 NOTE — PROGRESS NOTES
Reviewed Left mammogram and US  IMPRESSION: PROBABLY BENIGN - SHORT TERM INTERVAL FOLLOW-UP RECOMMENDED  The 0.9 cm x 0.5 cm x 0.8 cm oval cyst in the left breast is probably  benign. A follow-up in 6 months is recommended. Short interval  follow-up is recommended as the cyst is new and patient is postmenopausal  and denies exogenous hormones.  A follow-up mammogram in 6 months is recommended to demonstrate stability.     US and Mammogram ordered for mid Nov.

## 2024-05-20 ENCOUNTER — PATIENT MESSAGE (OUTPATIENT)
Dept: PRIMARY CARE | Facility: CLINIC | Age: 79
End: 2024-05-20
Payer: COMMERCIAL

## 2024-05-20 DIAGNOSIS — M25.552 LEFT HIP PAIN: ICD-10-CM

## 2024-05-20 NOTE — PATIENT COMMUNICATION
Faxed requisitions to [facility] [phone].  Spoke with patient relayed that it was done.

## 2024-05-20 NOTE — TELEPHONE ENCOUNTER
From: Adelia Munson  To: Gracie Tim  Sent: 5/20/2024 2:22 PM EDT  Subject: Requisitions    Dr. Tim,    I need a requisition from you to schedule the next follow-up visit for another diagnostic mammogram/ultrasound at the Clinic in 6 months. The number to fax that requisition to is: [phone] ATTN: Central Scheduling. Please ask whoever is sending the fax to let me know (call me), so I can then call and make the appointment.    I will also need a requisition for an appointment with Dr. Wm. Do. That one can be mailed to me.    Thanks so much.    Adelia Munson

## 2024-05-28 ENCOUNTER — OFFICE VISIT (OUTPATIENT)
Dept: CARDIOLOGY | Facility: CLINIC | Age: 79
End: 2024-05-28
Payer: COMMERCIAL

## 2024-05-28 VITALS
SYSTOLIC BLOOD PRESSURE: 110 MMHG | WEIGHT: 152 LBS | BODY MASS INDEX: 24.43 KG/M2 | DIASTOLIC BLOOD PRESSURE: 68 MMHG | HEIGHT: 66 IN | OXYGEN SATURATION: 92 % | HEART RATE: 71 BPM

## 2024-05-28 DIAGNOSIS — I48.0 PAROXYSMAL ATRIAL FIBRILLATION (MULTI): Primary | ICD-10-CM

## 2024-05-28 PROCEDURE — 99214 OFFICE O/P EST MOD 30 MIN: CPT | Performed by: INTERNAL MEDICINE

## 2024-05-28 PROCEDURE — 1160F RVW MEDS BY RX/DR IN RCRD: CPT | Performed by: INTERNAL MEDICINE

## 2024-05-28 PROCEDURE — 1036F TOBACCO NON-USER: CPT | Performed by: INTERNAL MEDICINE

## 2024-05-28 PROCEDURE — 1157F ADVNC CARE PLAN IN RCRD: CPT | Performed by: INTERNAL MEDICINE

## 2024-05-28 PROCEDURE — 93000 ELECTROCARDIOGRAM COMPLETE: CPT | Performed by: INTERNAL MEDICINE

## 2024-05-28 PROCEDURE — 1159F MED LIST DOCD IN RCRD: CPT | Performed by: INTERNAL MEDICINE

## 2024-05-28 NOTE — PROGRESS NOTES
Chief Complaint:   No chief complaint on file.     History Of Present Illness:    Adelia Munson is a 79 y.o. female with a history of atrial fibrillation/flutter, esophagitis, and degenerative joint disease here for follow-up.     Has been doing well from a cardiovascular standpoint.  Denies any chest pain, shortness of breath or palpitations.    She has had several other issues over the last year including issues with constipation, the need for cataract surgery, and unusual eye injury following a haircut, and tremors.     She tells me she is friends with Pippa Perez who was a friend of my wife's late grandmother.     14-day monitor October 2022 revealing predominantly sinus rhythm with no evidence of atrial fibrillation or flutter. Episodes of SVT with longest of 34 beats and fastest at 162 bpm.     Nuclear stress test 9/9/2022: No evidence of ischemia or scar. EF greater than 65%.     48-hour monitor August 2022: Predominantly sinus rhythm with rare PACs and PVCs.    Echocardiogram 7/6/2022: EF 60 to 65%.  RVSP 32 mmHg.     Catheterization September 2013: Angiographically normal right dominant system, EF 70%.      Past Medical History:  She has a past medical history of Abnormal MRI, musculoskeletal, Abnormal MRI, musculoskeletal, Abnormal x-ray of extremity, Abnormal x-ray of extremity, H/O abdominal x-ray series (03/29/2024), H/O bone density study (12/20/2021), H/O bone density study (03/27/2024), H/O cervical spine x-ray (04/2019), H/O chest x-ray (08/2014), H/O CT scan of abdomen, H/O CT scan of abdomen, H/O CT scan of abdomen, H/O CT scan of brain (11/2021), H/O CT scan of chest (08/2020), H/O echocardiogram (04/2005), H/O magnetic resonance imaging of brain and brain stem (11/24/2023), H/O spine x-ray (06/22/2023), H/O x-ray of lumbar spine (06/22/2023), History of colitis, History of colitis, History of PFTs (11/2022), and Holter monitor, abnormal (10/2022).    Past Surgical History:  She has a past  "surgical history that includes Tumor excision; Flexor tendon of forearm / wrist repair; Skin biopsy (07/2022); Appendectomy; Total abdominal hysterectomy; Cardiac catheterization (09/14/2020); Other surgical history; Cystoscopy (10/05/2021); Breast biopsy; Esophagogastroduodenoscopy (10/05/2021); Colonoscopy (10/05/2021); Breast biopsy (10/2018); Breast biopsy; Breast biopsy; Polysomnography (06/14/2023); Colonoscopy (03/28/2024); and Esophagogastroduodenoscopy (03/28/2024).      Social History:  She reports that she has never smoked. She has been exposed to tobacco smoke. She has never used smokeless tobacco. She reports current alcohol use. She reports that she does not use drugs.    Family History:  Family History   Problem Relation Name Age of Onset    Alzheimer's disease Mother      Anemia Sister      Transient ischemic attack Sister      Hypertension Brother      Hyperlipidemia Brother      Colon cancer Mother's Brother      Breast cancer Father's Sister      Breast cancer Paternal Cousin          Allergies:  Amoxicillin-pot clavulanate, Celecoxib, Diazepam, Enoxaparin, Gabapentin, Lexapro [escitalopram oxalate], Meloxicam, Mometasone, Naproxen, Nitrate analogues, Nitrofurantoin macrocrystal, Piroxicam, Propranolol, Ranitidine hcl, Amitriptyline, and Medrol [methylprednisolone]    Outpatient Medications:  Current Outpatient Medications   Medication Instructions    calcium carbonate-vitamin D3 600 mg-20 mcg (800 unit) tablet 2 tablets, oral, Daily    Eliquis 5 mg, oral, 2 times daily    esomeprazole (NEXIUM) 20 mg, oral, Daily    metoprolol succinate XL (TOPROL-XL) 25 mg, oral, Daily, Do not crush or chew.    multivitamin with minerals iron-free (Multivitamin 50 Plus) 1 tablet, oral, Daily       Last Recorded Vitals:  Visit Vitals  /68 (BP Location: Left arm, Patient Position: Sitting)   Pulse 71   Ht 1.676 m (5' 6\")   Wt 68.9 kg (152 lb)   SpO2 92%   BMI 24.53 kg/m²   Smoking Status Never   BSA 1.79 m² "      LASTWT(3):   Wt Readings from Last 3 Encounters:   05/28/24 68.9 kg (152 lb)   02/22/24 68.9 kg (151 lb 12.8 oz)   12/27/23 66.9 kg (147 lb 6.4 oz)       Physical Exam:  In general: alert and in no acute distress.   HEENT: Carotid upstrokes normal with no bruits. JVP is normal.   Pulmonary: Clear to auscultation bilaterally.  Cardiovascular: S1,S2, regular. No appreciable murmurs, rubs or gallops.   Lower extremities: Warm. 2+ distal pulses. No edema.     Last Labs:  CBC -  Recent Labs     10/16/23  1022 11/28/22  0904 10/04/22  1032   WBC 4.5 3.3* 3.8*   HGB 12.9 13.5 12.2   HCT 39.4 41.2 39.3    183 209   MCV 92 88 88       CMP -  Recent Labs     10/16/23  1022 10/04/22  1032 09/12/22  0950 07/09/22  2333 07/08/22  1946/22  0547 07/05/22  1739    140 141   < > 133* 135* 130*   K 4.3 4.6 4.7   < > 4.1 4.1 3.8    102 102   < > 93* 98 93*   CO2 31 32 31   < > 30 28 28   ANIONGAP 11 11 13   < > 14 13 13   BUN 15 13 12   < > 11 9 10   CREATININE 0.92 0.85 0.83   < > 0.75 0.82 0.84   EGFR 64  --   --   --   --   --   --    MG  --   --   --   --  1.94 1.80 1.80    < > = values in this interval not displayed.     Recent Labs     10/16/23  1022 10/04/22  1032 09/12/22  0950   ALBUMIN 4.1 4.1 3.8   ALKPHOS 23* 25* 26*   ALT 12 15 15   AST 17 17 19   BILITOT 0.6 0.5 0.5       LIPID PANEL -   Recent Labs     10/16/23  1022 09/12/22  0950 09/13/21  1007 08/14/20  1651   CHOL 183 195 197 164   LDLCALC 101*  --   --   --    LDLF  --  104* 102* 89   HDL 64.6 61.0 74.0 55.0   TRIG 88 150* 106 99       Recent Labs     07/23/22  1200 07/05/22  1739   * 74           Assessment/Plan   1) paroxysmal atrial fibrillation/flutter: In sinus rhythm today by ECG and exam.  Would continue with rate control and anticoagulation.    If she is interested in increasing the metoprolol to see if it helps with her tremors I would recommend that she wait until her constipation has resolved before increasing the  metoprolol.    2) follow-up: 1 year or sooner if needed      Ash Burnett MD

## 2024-06-05 ENCOUNTER — PATIENT MESSAGE (OUTPATIENT)
Dept: PRIMARY CARE | Facility: CLINIC | Age: 79
End: 2024-06-05
Payer: COMMERCIAL

## 2024-06-06 PROCEDURE — RXMED WILLOW AMBULATORY MEDICATION CHARGE

## 2024-06-06 NOTE — PATIENT COMMUNICATION
Relayed to patient   Verbally understands.     She will wait until her upcoming appt  Declines vv she doesn't do virtuals

## 2024-06-10 ENCOUNTER — TELEPHONE (OUTPATIENT)
Dept: PRIMARY CARE | Facility: CLINIC | Age: 79
End: 2024-06-10
Payer: COMMERCIAL

## 2024-06-10 DIAGNOSIS — N28.1 ACQUIRED CYST OF KIDNEY: ICD-10-CM

## 2024-06-10 DIAGNOSIS — N35.82 OTHER STRICTURE OF URETHRA IN FEMALE: ICD-10-CM

## 2024-06-10 DIAGNOSIS — R89.6 ABNORMAL BLADDER CYTOLOGY: ICD-10-CM

## 2024-06-10 DIAGNOSIS — N18.2 CKD (CHRONIC KIDNEY DISEASE) STAGE 2, GFR 60-89 ML/MIN: ICD-10-CM

## 2024-06-10 NOTE — TELEPHONE ENCOUNTER
Patient left VM stating she needs a referral to Urology. That is formatted. Patient stated she needs a follow up MRI per PCP. Patient stated its a follow up from the one done on 6/3/2024.  Please advise

## 2024-06-12 ENCOUNTER — OFFICE VISIT (OUTPATIENT)
Dept: ORTHOPEDIC SURGERY | Facility: CLINIC | Age: 79
End: 2024-06-12
Payer: COMMERCIAL

## 2024-06-12 ENCOUNTER — HOSPITAL ENCOUNTER (OUTPATIENT)
Dept: RADIOLOGY | Facility: CLINIC | Age: 79
Discharge: HOME | End: 2024-06-12
Payer: COMMERCIAL

## 2024-06-12 ENCOUNTER — PHARMACY VISIT (OUTPATIENT)
Dept: PHARMACY | Facility: CLINIC | Age: 79
End: 2024-06-12
Payer: MEDICARE

## 2024-06-12 DIAGNOSIS — M53.3 SI (SACROILIAC) JOINT DYSFUNCTION: Primary | ICD-10-CM

## 2024-06-12 DIAGNOSIS — M25.552 LEFT HIP PAIN: ICD-10-CM

## 2024-06-12 DIAGNOSIS — M25.551 RIGHT HIP PAIN: ICD-10-CM

## 2024-06-12 PROCEDURE — 1159F MED LIST DOCD IN RCRD: CPT | Performed by: ORTHOPAEDIC SURGERY

## 2024-06-12 PROCEDURE — 73522 X-RAY EXAM HIPS BI 3-4 VIEWS: CPT | Mod: BILATERAL PROCEDURE | Performed by: ORTHOPAEDIC SURGERY

## 2024-06-12 PROCEDURE — 99213 OFFICE O/P EST LOW 20 MIN: CPT | Performed by: ORTHOPAEDIC SURGERY

## 2024-06-12 PROCEDURE — 1157F ADVNC CARE PLAN IN RCRD: CPT | Performed by: ORTHOPAEDIC SURGERY

## 2024-06-12 PROCEDURE — 99204 OFFICE O/P NEW MOD 45 MIN: CPT | Performed by: ORTHOPAEDIC SURGERY

## 2024-06-12 PROCEDURE — 73522 X-RAY EXAM HIPS BI 3-4 VIEWS: CPT

## 2024-06-12 RX ORDER — METHYLPREDNISOLONE 4 MG/1
TABLET ORAL
Qty: 21 TABLET | Refills: 0 | Status: SHIPPED | OUTPATIENT
Start: 2024-06-12

## 2024-06-12 NOTE — PROGRESS NOTES
"  Subjective    Patient ID: Adelia    Chief Complaint:   Chief Complaint   Patient presents with    Left Hip - Pain, New Patient Visit     Xrays  today    Right Hip - Pain, New Patient Visit     Xrays today\       79-year-old female presented to clinic today as a new patient to Dr. Do.  She seen Dr. Beltrán in the past.  Chief complaint today is bilateral hip pain.  She had MRI back in 2022 which showed mild osteoarthritis and tendinosis of her gluteal tendons.  She states that both \"hips\" have been bothering her for the last few years but getting worse recently.  She notices that she is walking with more of a waddle at this time.  She has difficulty with sitting for long periods such as getting out of the car.  Difficulty sitting on the sofa or anything soft.  She does better with rigid chairs.  She is fine when she is up and walking.  No numbness tingling no fevers chills reported.  No radicular symptoms.  Most of her symptoms seem to be centered around her pelvis.        Past medical history        The patient's past medical history, family history, social history, and review of systems were documented on the patient's medical intake form.  The medical intake form was reviewed and scanned into the electronic medical record for future use.  History is otherwise negative except as stated in the HPI.     Physical exam     General: Alert and oriented to place, person, and time.  No acute distress and breathing comfortably; pleasant and cooperative with the examination.  HEENT: Head is normocephalic and atraumatic.  Neck: Supple, no visible swelling.  Cardiovascular: Good perfusion to the affected extremity.  Lungs: No audible wheezing or labored breathing.  Abdomen: Nondistended     Extremity:  Right hip neurovascular intact.  Good range of motion on exam.  No instability.  No pain with passive range of motion but she does have some mild hip flexor tightness and SI pain on the right side with active range of " motion.  Negative straight leg raise test.  2+ pulses bilateral lower extremity.  Cap refill time is distally.  Mild tenderness palpation over the greater trochanteric bursa.    Left hip is neurovascular intact.  Good range of motion on exam.  No instability.  No pain with passive range of motion but she does have some mild hip flexor tightness and SI pain on the left side with active range of motion.  Negative straight leg raise test.  2+ pulses bilateral lower extremity.  Capillary refill within normal limits distally.  Mild tenderness palpation over the greater trochanteric bursa.     Diagnostics:  Please see dictated x-ray report     Procedures  [none   ]     Assessment:  Bilateral SI joint dysfunction     Treatment plan:  1.  We a long discussion today in regards to overall symptomatology as well as her biomechanics.  2.  We will continue with conservative management.  She has had a Medrol Dosepak years ago which gave her some decent relief.  Will go ahead and represcribe this medication and this was sent over to [facility] [place] [facility].  Prescription for outpatient physical therapy was given for pelvic stability core strength.  She will follow-up with us in approximately 6 weeks for reevaluation without x-ray.  3.  For complete plan and/or surgical details, please refer to Dr. Do's portion of the split/shared dictation.  4.  All of the patient's questions were answered.     This note was prepared using voice recognition software.  The details of this note are correct and have been reviewed, and corrected to the best of my ability.  Some grammatical areas may persist related to the Dragon software     Zhao De Jesus PA-C, [facility]  [facility] [place]     [phone]        In a face-to-face encounter performed today, LOGAN Do MD performed a history and physical examination, discussed pertinent diagnostic studies if indicated, and discussed diagnosis and management  strategies with both the patient and the midlevel provider.  I reviewed the midlevel's note and agree with the documented findings and plan of care.  Greater than 50% of the evaluation and treatment decision was performed by the physician myself during today's visit.    New patient to me 79-year-old female presents complaining bilateral hip pain she states at some point she was just diagnosed as having avascular necrosis but she is not sure how or when or who did that which has been having intermittent pain in the posterior of her hip for quite some time she has some tightness in her hip flexors but no specific injury.  On examination minimal pain with internal ex rotation hip most of her tenderness along the SI joint posteriorly brisk cap refill good muscle strength distally but x-rays are obtained today and reviewed by myself which show no evidence of avascular necrosis impression is SI joint dysfunction with pelvic instability.  Plan is physical therapy Medrol Dosepak follow-up 6 weeks.      Patient has severe impairment related to her presenting condition.  It is significantly impairing bodily function.  We did discuss surgical and nonsurgical options.    This note was prepared using voice recognition software.  The details of this note are correct and have been reviewed, and corrected to the best of my ability.  Some grammatical areas may persist related to the Dragon software    Joey Do MD  Senior Attending Physician  [facility]    [phone]

## 2024-06-24 RX ORDER — SUCRALFATE 1 G/1
1 TABLET ORAL
COMMUNITY
Start: 2023-11-29 | End: 2024-06-25 | Stop reason: ALTCHOICE

## 2024-06-24 RX ORDER — TIZANIDINE 2 MG/1
2 TABLET ORAL EVERY 6 HOURS PRN
COMMUNITY
Start: 2022-12-06 | End: 2024-06-25 | Stop reason: ALTCHOICE

## 2024-06-24 RX ORDER — ACETAMINOPHEN 500 MG
1 TABLET ORAL DAILY
COMMUNITY
Start: 2024-05-19

## 2024-06-25 ENCOUNTER — APPOINTMENT (OUTPATIENT)
Dept: PRIMARY CARE | Facility: CLINIC | Age: 79
End: 2024-06-25
Payer: COMMERCIAL

## 2024-06-25 ENCOUNTER — TELEPHONE (OUTPATIENT)
Dept: PHARMACY | Facility: HOSPITAL | Age: 79
End: 2024-06-25

## 2024-06-25 VITALS
WEIGHT: 153 LBS | HEART RATE: 64 BPM | SYSTOLIC BLOOD PRESSURE: 123 MMHG | OXYGEN SATURATION: 96 % | BODY MASS INDEX: 24.69 KG/M2 | DIASTOLIC BLOOD PRESSURE: 74 MMHG

## 2024-06-25 DIAGNOSIS — M53.3 SACROILIAC JOINT DYSFUNCTION: ICD-10-CM

## 2024-06-25 DIAGNOSIS — F32.1 CURRENT MODERATE EPISODE OF MAJOR DEPRESSIVE DISORDER WITHOUT PRIOR EPISODE (MULTI): Primary | ICD-10-CM

## 2024-06-25 DIAGNOSIS — R35.1 NOCTURIA: ICD-10-CM

## 2024-06-25 DIAGNOSIS — N60.02 CYST OF LEFT BREAST: ICD-10-CM

## 2024-06-25 DIAGNOSIS — K59.00 CONSTIPATION, UNSPECIFIED CONSTIPATION TYPE: ICD-10-CM

## 2024-06-25 DIAGNOSIS — R10.31 RLQ ABDOMINAL PAIN: ICD-10-CM

## 2024-06-25 DIAGNOSIS — N18.2 CKD (CHRONIC KIDNEY DISEASE) STAGE 2, GFR 60-89 ML/MIN: ICD-10-CM

## 2024-06-25 DIAGNOSIS — Z79.899 MEDICATION MANAGEMENT: ICD-10-CM

## 2024-06-25 DIAGNOSIS — J43.8 OTHER EMPHYSEMA (MULTI): ICD-10-CM

## 2024-06-25 DIAGNOSIS — F41.9 ANXIETY: ICD-10-CM

## 2024-06-25 PROBLEM — N60.09 BREAST CYST: Status: ACTIVE | Noted: 2024-06-25

## 2024-06-25 PROBLEM — R15.2 FECAL URGENCY: Status: RESOLVED | Noted: 2023-06-30 | Resolved: 2024-06-25

## 2024-06-25 PROBLEM — R10.30 GROIN PAIN: Status: ACTIVE | Noted: 2024-06-25

## 2024-06-25 PROBLEM — J39.2 THROAT IRRITATION: Status: RESOLVED | Noted: 2024-01-24 | Resolved: 2024-06-25

## 2024-06-25 PROBLEM — K58.9 IBS (IRRITABLE BOWEL SYNDROME): Status: ACTIVE | Noted: 2023-02-05

## 2024-06-25 PROCEDURE — 99215 OFFICE O/P EST HI 40 MIN: CPT | Performed by: INTERNAL MEDICINE

## 2024-06-25 PROCEDURE — G2211 COMPLEX E/M VISIT ADD ON: HCPCS | Performed by: INTERNAL MEDICINE

## 2024-06-25 PROCEDURE — 1158F ADVNC CARE PLAN TLK DOCD: CPT | Performed by: INTERNAL MEDICINE

## 2024-06-25 PROCEDURE — 1160F RVW MEDS BY RX/DR IN RCRD: CPT | Performed by: INTERNAL MEDICINE

## 2024-06-25 PROCEDURE — 1036F TOBACCO NON-USER: CPT | Performed by: INTERNAL MEDICINE

## 2024-06-25 PROCEDURE — 1159F MED LIST DOCD IN RCRD: CPT | Performed by: INTERNAL MEDICINE

## 2024-06-25 PROCEDURE — 1157F ADVNC CARE PLAN IN RCRD: CPT | Performed by: INTERNAL MEDICINE

## 2024-06-25 PROCEDURE — 1123F ACP DISCUSS/DSCN MKR DOCD: CPT | Performed by: INTERNAL MEDICINE

## 2024-06-25 RX ORDER — VIBEGRON 75 MG/1
1 TABLET, FILM COATED ORAL DAILY
Qty: 30 TABLET | Refills: 11 | Status: SHIPPED | OUTPATIENT
Start: 2024-06-25

## 2024-06-25 RX ORDER — VIBEGRON 75 MG/1
1 TABLET, FILM COATED ORAL DAILY
Qty: 30 TABLET | Refills: 11 | Status: SHIPPED | OUTPATIENT
Start: 2024-06-25 | End: 2024-06-25 | Stop reason: SDUPTHER

## 2024-06-25 NOTE — PROGRESS NOTES
CC/HPI: Multiple issues  6 month follow up (Ongoing right groin pains since last summer, saw GI /Saw Ortho () Hips are uneven , starting PT in July).  In 11/23 increased Lexapro 15mg  She didn't do that  Weaned off it instead  AE  Cough went away so didn't have to increase PPI to BID  HA went away also    Saw Urology 6/18/24: (copied)  IMPRESSION:  (N28.1) Renal cyst  (primary encounter diagnosis)  Comment: complex renal cyst possibly hemorrhagic- new    Plan: MR kidney will be obtain to evalulate    Saw Ortho 6/12/24 (copied)  Bilateral SI joint dysfunction  Treatment plan:  1.  We a long discussion today in regards to overall symptomatology as well as her biomechanics.  2.  We will continue with conservative management.  She has had a Medrol Dosepak years ago which gave her some decent relief.  Will go ahead and represcribe this medication and this was [facility].  Prescription for outpatient physical therapy was given for pelvic stability core strength.  She will follow-up with us in approximately 6 weeks for reevaluation without x-ray.  3.  For complete plan and/or surgical details, please refer to Dr. Do's portion of the split/shared dictation.  4.  All of the patient's questions were answered.    Medrol helped the groin pain while on it  Sx are back  Starting PT 7/11/24    Saw Cardiology in May (copied)  1) paroxysmal atrial fibrillation/flutter: In sinus rhythm today by ECG and exam.  Would continue with rate control and anticoagulation.  If she is interested in increasing the metoprolol to see if it helps with her tremors I would recommend that she wait until her constipation has resolved before increasing the metoprolol.    Saw GI 3/29/24 (Copied)  Assessment: This is an elderly woman with diverticulosis of the colon but no evidence of diverticulitis.  She is giving a good description for medication induced constipation now transforming into obstipation.  I  suspect her current symptoms are due to a large amount of solid stool burden on the right side.  Will get a KUB to document this.  She will most likely do the 4-day cleanout.  After that we will start her on a regimen of fiber and MiraLAX to prevent this from happening again.  All of her questions were answered to her satisfaction.  Will get blood work in addition to the KUB to make sure were not missing anything.     Abd pain never resolved  RLQ in location  Also gets bloated  If sleeps on right side it hurts 10/10  If gets up and walks around it goes away  Any pressure on abdomen cuases the pain.pressure  Wearing larger paints  Hx appendectomy and TAHBSO  Hips off alignment  BMS daily now  Cscope 11/23: Multiple small and large-mouthed diverticula were found in the        sigmoid colon and descending colon. There was no evidence of        diverticular bleeding.   CT 5/30/24 done to luis further: No acute abdominopelvic process.   Left lower pole renal cyst with nodular high density component, favoring   hemorrhagic cyst, though should be further assessed via renal CT/MRI.     At night when has to get up to urinate  Pressure is different in the bladder  More intense  Feels like something is pressing on bladder    Mamm reviewed:  There is a 0.9 cm x 0.5 cm x 0.8 cm oval cyst in the left breast at 3 o'clock posterior depth 3 cm from the nipple.  This oval cyst is anechoic.  This correlates with mammography findings   Repeat scheduled    Assessment and Plan:  Problem List Items Addressed This Visit          Medium    Anxiety    Overview     Diazepam made anxiety worse  On lexapro in past (caused AE)  Declines additional medication         Breast cyst    Overview     Dx mamm/US 5/24: There is a 0.9 cm x 0.5 cm x 0.8 cm oval cyst in the left breast at 3   o'clock posterior depth 3 cm from the nipple.  This oval cyst is   anechoic.  This correlates with mammography findings          CKD (chronic kidney disease) stage 2,  GFR 60-89 ml/min    Overview     GFR 61 in 6/24  Avoid NSAIDs  Hydrate well daily  Consider Jardiance to preserve kidney function         Constipation    Overview     S/p GI consult 3/24: medication induced constipation transforming into obstipation.  XR done: nonobstructive bowel gas pattern.  No evidence of pneumoperitoneum.  Visualized portions of the lung are unremarkable.  No acute bony abnormality.  S-shaped curvature of the thoracolumbar spine.  Slight widening of the pubic symphysis   S/p 4-day cleanout.   Continue fiber and MiraLAX to prevent this from happening again          Current moderate episode of major depressive disorder without prior episode (Multi) - Primary    Overview     Lexapro caused AE  Declines medical therapy now  Sees psychiatry  Consider Genesight testing         Emphysema lung (Multi)    Overview     PFTs 11/22; no obstruction, air trapping and reduced DLCO.   CXR in 9/2022 shows emphysematous changes.   CT/PE was normal.   Normal echo  S/p Pulm consult  A1AT testing (-)  Asymptomatic  Monitor         Nocturia    Overview     Myrbetriq too $$  S/p urology consult:  Consider PM dose Lasix at 6pm IF voiding log shows >30% output at night  possible other OAB trials eg Botox vs Interstim         Current Assessment & Plan     Consider gemtesa if affordable         Relevant Medications    vibegron (Gemtesa) 75 mg tablet    RLQ abdominal pain    Overview     Sx better after Medrol, which makes me think sports hernia or psoas tendonitis  Other thought would be true hernia or adhesions from previous abd surgery  Avoiding NSAIDs due to CKD             Current Assessment & Plan     PT eval and treat  Get US abd to r/o hernia  See if can help her OAB symptoms at night also           Relevant Orders    US abdomen limited    Sacroiliac joint dysfunction    Overview     With LLD           Current Assessment & Plan     PT eval to start  May need chiro also          Other Visit Diagnoses        Medication management        Relevant Orders    Referral to Clinical Pharmacy            ROS otherwise negative aside from what was mentioned above in HPI.    Vitals  /74   Pulse 64   Wt 69.4 kg (153 lb)   SpO2 96%   BMI 24.69 kg/m²   Body mass index is 24.69 kg/m².  Physical Exam  Gen: Alert, NAD  HEENT: Unremarkable  Respiratory:  Lungs CTAB  CV: RRR  Neuro:  Gross motor and sensory intact, chronic tremor  Abd: Soft, Tender in RLQ, no R/G/R  She has two small erythehmatous lesions on her lower abdomen (saw [facility])    Allergies and Medications  Amoxicillin-pot clavulanate, Celecoxib, Diazepam, Enoxaparin, Gabapentin, Lexapro [escitalopram oxalate], Meloxicam, Mometasone, Naproxen, Nitrate analogues, Nitrofurantoin macrocrystal, Piroxicam, Propranolol, Ranitidine hcl, and Amitriptyline  Current Outpatient Medications   Medication Instructions    calcium carbonate-vitamin D3 600 mg-20 mcg (800 unit) tablet 2 tablets, oral, Daily    Eliquis 5 mg, oral, 2 times daily    esomeprazole (NEXIUM) 20 mg, oral, Daily    Gemtesa 75 mg, oral, Daily    Lactobacillus acidophilus (Probiotic) 10 billion cell capsule 1 capsule, oral, Daily    metoprolol succinate XL (TOPROL-XL) 25 mg, oral, Daily, Do not crush or chew.    multivitamin with minerals iron-free (Multivitamin 50 Plus) 1 tablet, oral, Daily     Time spent prepping/preparing for visit as well as pre/post-visit charting: 10 minutes  Time spent directly with Patient: 30 minutes  Total Time: 40 minutes which included preparing to see the patient, face-to-face patient care, completing clinical documentation, obtaining and/or reviewing separately obtained history, performing a medically appropriate examination, ordering medications, tests, or procedures and discussing the plan of care..

## 2024-06-25 NOTE — TELEPHONE ENCOUNTER
Patient and I spoke today to add Ailin to her [employer] profile. Approved per Dr. Tim.     Nelia Ndiaye, PharmD

## 2024-06-27 ENCOUNTER — HOSPITAL ENCOUNTER (OUTPATIENT)
Dept: RADIOLOGY | Facility: CLINIC | Age: 79
Discharge: HOME | End: 2024-06-27
Payer: COMMERCIAL

## 2024-06-27 ENCOUNTER — APPOINTMENT (OUTPATIENT)
Dept: RADIOLOGY | Facility: CLINIC | Age: 79
End: 2024-06-27
Payer: COMMERCIAL

## 2024-06-27 DIAGNOSIS — R10.31 RLQ ABDOMINAL PAIN: ICD-10-CM

## 2024-06-27 PROCEDURE — 76705 ECHO EXAM OF ABDOMEN: CPT

## 2024-06-27 PROCEDURE — 76705 ECHO EXAM OF ABDOMEN: CPT | Performed by: RADIOLOGY

## 2024-07-03 ENCOUNTER — APPOINTMENT (OUTPATIENT)
Dept: RADIOLOGY | Facility: CLINIC | Age: 79
End: 2024-07-03
Payer: COMMERCIAL

## 2024-07-05 ENCOUNTER — PATIENT OUTREACH (OUTPATIENT)
Dept: PRIMARY CARE | Facility: CLINIC | Age: 79
End: 2024-07-05
Payer: COMMERCIAL

## 2024-07-05 DIAGNOSIS — R10.31 RLQ ABDOMINAL PAIN: ICD-10-CM

## 2024-07-05 DIAGNOSIS — N18.2 CKD (CHRONIC KIDNEY DISEASE) STAGE 2, GFR 60-89 ML/MIN: ICD-10-CM

## 2024-07-05 DIAGNOSIS — M25.552 PAIN OF LEFT HIP: ICD-10-CM

## 2024-07-05 PROCEDURE — RXMED WILLOW AMBULATORY MEDICATION CHARGE

## 2024-07-05 NOTE — PROGRESS NOTES
Spoke with Adelia  Starting PT next week at [facility]  Will see if this improves lower abdominal pain  Dr. Do prescribed steroid pack which initially helped  But then pain returned  Especially painful to bend over    Remains active  Walking every other day  Continues exercise classes    Discussed tremor  Dr. Barone had recommended increasing metoprolol  To 50mg   She was hesitant to do this  We reviewed benefits vs risks  She may consider trying it    Reviewed patient's chart  Copied from Dr. Barone 2/22/24 Office Visit  Assessment:   Essential Tremor  - she has a longstanding history of ET affecting her left hand and head  - reviewed treatment options including beta-blockers, primidone (interacts with Eliquis), or topamax  - she is already on metoprolol XL 25 mg/day  - will discuss with her PCP and cardiologist about titrating Metoprolol up  - if we're unable to, then I will consider adding Topamax

## 2024-07-08 ENCOUNTER — PHARMACY VISIT (OUTPATIENT)
Dept: PHARMACY | Facility: CLINIC | Age: 79
End: 2024-07-08
Payer: MEDICARE

## 2024-07-12 DIAGNOSIS — N94.89 FEMALE PELVIC CONGESTION SYNDROME: ICD-10-CM

## 2024-07-22 ENCOUNTER — APPOINTMENT (OUTPATIENT)
Dept: NEUROLOGY | Facility: CLINIC | Age: 79
End: 2024-07-22
Payer: COMMERCIAL

## 2024-07-26 ENCOUNTER — APPOINTMENT (OUTPATIENT)
Dept: DERMATOLOGY | Facility: CLINIC | Age: 79
End: 2024-07-26
Payer: COMMERCIAL

## 2024-07-30 ENCOUNTER — OFFICE VISIT (OUTPATIENT)
Dept: ORTHOPEDIC SURGERY | Facility: CLINIC | Age: 79
End: 2024-07-30
Payer: COMMERCIAL

## 2024-07-30 DIAGNOSIS — M53.3 SI (SACROILIAC) JOINT DYSFUNCTION: Primary | ICD-10-CM

## 2024-07-30 PROCEDURE — 1157F ADVNC CARE PLAN IN RCRD: CPT | Performed by: ORTHOPAEDIC SURGERY

## 2024-07-30 PROCEDURE — 99213 OFFICE O/P EST LOW 20 MIN: CPT | Performed by: ORTHOPAEDIC SURGERY

## 2024-07-30 PROCEDURE — 1159F MED LIST DOCD IN RCRD: CPT | Performed by: ORTHOPAEDIC SURGERY

## 2024-07-30 PROCEDURE — 1036F TOBACCO NON-USER: CPT | Performed by: ORTHOPAEDIC SURGERY

## 2024-07-30 PROCEDURE — 1123F ACP DISCUSS/DSCN MKR DOCD: CPT | Performed by: ORTHOPAEDIC SURGERY

## 2024-07-30 PROCEDURE — 1160F RVW MEDS BY RX/DR IN RCRD: CPT | Performed by: ORTHOPAEDIC SURGERY

## 2024-07-30 NOTE — PROGRESS NOTES
Subjective    Patient ID: Adelia    Chief Complaint:   Chief Complaint   Patient presents with    Left Hip - Follow-up     SI joint dysfunction, s/p Medrol Dosepak, physical therapy    Right Hip - Follow-up     SI joint dysfunction, s/p Medrol Dosepak, physical therapy       History of present illness    79-year-old female presented clinic today for follow-up evaluation right SI joint dysfunction.  She states that physical therapy seems to be helping the most.  She is gone a few times and has gotten home exercise program from her therapist which seems to be working.  The Medrol Dosepak also seem to help.  Seems to be very encouraged with the progress that she has made.  She is ambulating without assistance.  She has less pain.  Improved range of motion.      Past medical , Surgical, Family and social history reviewed.      Physical exam  General: No acute distress and breathing comfortably.  Patient is pleasant and cooperative with the examination.    Extremity  Right hip is neurovascular intact.  Demonstrates good range of motion.  No groin pain.  Compartments soft.  Capillary refill within normal is distally.  No instability.  No sign of infection.  No DVT.  Improved strength.    Diagnostics  [ none]  No results found.     Procedure  [ none]    Assessment  Right-sided SI joint dysfunction    Treatment plan  1.  She was encouraged to continue with weightbearing activity as tolerated.  2.  Continue with her physical therapy program at this time.  3.  She will call us if she needs anything in the future otherwise follow-up as needed.  For complete plan and/or surgical details, please refer to Dr. Do's portion of the split/shared dictation.  4.  All of the patient's questions were answered.    No orders of the defined types were placed in this encounter.      This note was prepared using voice recognition software.  The details of this note are correct and have been reviewed, and corrected to the best of my  ability.  Some grammatical areas may persist related to the Dragon software    Zhao De Jesus PA-C, [facility]  [facility]  [facility] [place]    [phone]    In a face-to-face encounter performed today, I Joey Do MD performed a history and physical examination, discussed pertinent diagnostic studies if indicated, and discussed diagnosis and management strategies with both the patient and the midlevel provider.  I reviewed the midlevel's note and agree with the documented findings and plan of care.  Greater than 50% of the evaluation and treatment decision was performed by the physician myself during today's visit.    79-year-old female here for follow-up of her hip pain she states that physical therapy seems to be helping hip is feeling pretty good much less pain that she was having previously she did take the Medrol Dosepak without issue.  On examination he has good range of motion of both hips she is ambulating with good muscle strength distally.  Brisk cap refill compartments are soft.  Impression is resolving hip pain due to SI joint dysfunction.  Plan is continue with her physical therapy continue work on range of motion strengthening of the symptoms return to let me know otherwise follow-up as needed.      Patient has severe impairment related to her presenting condition.  It is significantly impairing bodily function.  We did discuss surgical and nonsurgical options.    This note was prepared using voice recognition software.  The details of this note are correct and have been reviewed, and corrected to the best of my ability.  Some grammatical areas may persist related to the Dragon software    Joey Do MD  Senior Attending Physician  [facility]    [phone]

## 2024-08-04 DIAGNOSIS — I47.10 PAROXYSMAL SUPRAVENTRICULAR TACHYCARDIA (CMS-HCC): ICD-10-CM

## 2024-08-05 PROCEDURE — RXMED WILLOW AMBULATORY MEDICATION CHARGE

## 2024-08-07 ENCOUNTER — PHARMACY VISIT (OUTPATIENT)
Dept: PHARMACY | Facility: CLINIC | Age: 79
End: 2024-08-07
Payer: MEDICARE

## 2024-08-15 ENCOUNTER — APPOINTMENT (OUTPATIENT)
Dept: OBSTETRICS AND GYNECOLOGY | Facility: CLINIC | Age: 79
End: 2024-08-15
Payer: COMMERCIAL

## 2024-08-17 ENCOUNTER — PATIENT MESSAGE (OUTPATIENT)
Dept: PRIMARY CARE | Facility: CLINIC | Age: 79
End: 2024-08-17
Payer: COMMERCIAL

## 2024-08-17 ASSESSMENT — ENCOUNTER SYMPTOMS
ABDOMINAL PAIN: 1
HEADACHES: 1
FREQUENCY: 1

## 2024-08-19 ENCOUNTER — APPOINTMENT (OUTPATIENT)
Dept: OBSTETRICS AND GYNECOLOGY | Facility: CLINIC | Age: 79
End: 2024-08-19
Payer: COMMERCIAL

## 2024-08-19 VITALS
WEIGHT: 153.2 LBS | DIASTOLIC BLOOD PRESSURE: 72 MMHG | BODY MASS INDEX: 24.04 KG/M2 | HEIGHT: 67 IN | SYSTOLIC BLOOD PRESSURE: 120 MMHG

## 2024-08-19 DIAGNOSIS — N32.81 OAB (OVERACTIVE BLADDER): Primary | ICD-10-CM

## 2024-08-19 DIAGNOSIS — N94.89 FEMALE PELVIC CONGESTION SYNDROME: ICD-10-CM

## 2024-08-19 PROCEDURE — 99205 OFFICE O/P NEW HI 60 MIN: CPT | Performed by: OBSTETRICS & GYNECOLOGY

## 2024-08-19 PROCEDURE — 1123F ACP DISCUSS/DSCN MKR DOCD: CPT | Performed by: OBSTETRICS & GYNECOLOGY

## 2024-08-19 PROCEDURE — 1159F MED LIST DOCD IN RCRD: CPT | Performed by: OBSTETRICS & GYNECOLOGY

## 2024-08-19 PROCEDURE — 1157F ADVNC CARE PLAN IN RCRD: CPT | Performed by: OBSTETRICS & GYNECOLOGY

## 2024-08-19 RX ORDER — MIRABEGRON 25 MG/1
25 TABLET, FILM COATED, EXTENDED RELEASE ORAL DAILY
Qty: 90 TABLET | Refills: 2 | Status: SHIPPED | OUTPATIENT
Start: 2024-08-19

## 2024-08-19 ASSESSMENT — ENCOUNTER SYMPTOMS
DEPRESSION: 0
OCCASIONAL FEELINGS OF UNSTEADINESS: 0
LOSS OF SENSATION IN FEET: 0

## 2024-08-19 NOTE — PROGRESS NOTES
GYN PROGRESS NOTE          Chief complaint: follow up    HPI:  Answers submitted by the patient for this visit:  Abdominal Pain Questionnaire (Submitted on 8/17/2024)  Chief Complaint: Abdominal pain  Chronicity: chronic  Onset: more than 1 year ago  Onset quality: gradual  Frequency: daily  Episode duration: 1 Hours  Progression since onset: waxing and waning  Pain location: RLQ, suprapubic region, right flank  Pain - numeric: 9/10  Pain quality: aching, cramping  Radiates to: RLQ, suprapubic region, right flank  frequency: Yes  headaches: Yes  Aggravated by: being still, certain positions, movement  Relieved by: movement, recumbency  Diagnostic workup: CT scan, GI consult, ultrasound      HPI       New Patient Visit     Additional comments: Pain Right low abdomen x 1 year intermittent. Can only sleep on left side or sitting up. After getting up and walking around makes it feel better, Sitting too long, bending over causes pain to come back. CT scan in May- showed renal cyst. MRI done last Thursday. PT x 1.5 months for hip pain. Right hip is lower than the left.     Colonoscopy in November- normal           Last edited by Anastasiya Guan CMA on 8/19/2024 10:50 AM.      - She has 3-4 episodes of nocturia nightly.  - Her last colonoscopy was in November 2023 and was normal.  - In March she saw her GI who prescribed Benefiber and Miralax for constipation without improvement.  - She saw Dr. Do for right hip pain which she has started physical therapy for.  - Noted that she had a hysterectomy and appendectomy in 1990.    Reviewed case with patient, reviewed plans.    Extended conversation review of previous history and physical exam visit over 60 minutes    ROS:  GEN - no fevers or chills  RESP - no SOB or cough  GYN - see HPI      HISTORY:  Past Medical History:   Diagnosis Date    Abnormal MRI, musculoskeletal     MRI hip 6/22: 1. Atrophy of the left gluteus medius and minimus consistent with chronic tendon  tears with associated trochanteric bursitis. 2. Mild osteoarthrosis of the left hip joint and small left joint effusion. 3. Mild feathery edema involving the left rectus femoris and adductor longus with adjacent intermuscular edema suggestive of mild component of muscular injury.    Abnormal MRI, musculoskeletal     pt 2: MRI Tib/Fib 1/2020: Benign appearing area in the proximal tibial metaphysis, without suspicious features, likely postsurgical defect    Abnormal Pap smear of cervix 1990    Abnormal x-ray of extremity     XR Tib/fib 5/7/21: IMPRESSION: Cortical based lucency with irregularity in the posterior cortex of the proximal tibial diaphysis. This may represent a prior fibrous lesions. Comparison with prior studies is advised. Followup in 6 months advised for stability if no priors available    Abnormal x-ray of extremity     pt 2: XR Knee: lytic lesion proximal third tibia, knee compartments normal (MRI done that shows postsurgical lesion) 1/16/21: Benign appearing areas in the proximal tibial metaphysis, without suspicious features, likley postsurgical defect give pts hx    Anemia     Endometriosis 1990    Fibroid 1990    H/O abdominal ultrasound 06/29/2024    US abd soft tissue: No mass or hernia noted    H/O abdominal x-ray series 03/29/2024    nonobstructive bowel gas pattern.  No evidence of pneumoperitoneum.  Visualized portions of the lung are unremarkable.  No acute bony abnormality.  S-shaped curvature of the thoracolumbar spine.  Slight widening of the pubic symphysi    H/O bone density study 12/20/2021    Ten Year Major Osteoporotic Fracture Risk: 22.52% Ten Year Hip Fracture Risk: 13.6% TScore: 2.2 6/18: THE LOWEST TSCORE IS 1.7, FRAX 10/2%    H/O bone density study 03/27/2024    Lowest T score -2.3. FRACTURE RISK (based on FRAX):                       10-year absolute fracture risk:                           - major osteoporotic fracture = 32.1 %                           - hip fracture = 21.7  %    H/O cervical spine x-ray 04/2019    Mild decrease in the intervertebral disc space at the C56 and C6C7. Moderate facet arthropathy with uncovertebral hypertrophy at C4C5 through C6C7    H/O chest x-ray 08/2014    normal 10/22/21: Mild discogenic degenerative changes are seen throughout the thoracic spine. 7/22: normal    H/O CT scan of abdomen     2/13: liver and kidney cysts 7/22: Stomach is decompressed, slightly limiting its evaluation with equivocal mucosal hyperemia and submucosal edema, somewhat suspicious for acute gastritis. There is mild prominence of mucosal folds and subjective mucosal hyperemia in small bowel, with mesenteric edema, suspicious for infectious/inflammatory enteritis.    H/O CT scan of abdomen     pt 2: Colon is mostly decompressed, with small volume of stool in the proximal colon. Mucosal hyperemia in the colon, most pronounced involving the descending and rectosigmoid colon, with mesocolonic and mesorectal fat stranding, suspicious for acute proctocolitis. No bowel dilation. Mild pelvic ascites, probably reactive in etiology. No pneumoperitoneum or loculated intraperitoneal collection.    H/O CT scan of abdomen     pt 3: Small hepatic and renal cortical cysts. Thoracolumbar scoliosis and lumbar spine degenerative changes. Repeat CT 7/21/22: Imp approved hyperemia of small bowel and distal colon. Mild generalized mesenteric edema persists with small volume fluid which    H/O CT scan of abdomen 05/31/2024    Left lower pole renal cyst with nodular high density component, favoring  hemorrhagic cyst, though should be further assessed via renal CT/MRI. 0.6cm lung nodule right base, stable from 2013, benign. Diverticulosis    H/O CT scan of brain 11/2021    There are scattered dural ossifications most prominently along the left frontal lobe where there is a 34 mm thick 15 mm diameter ossification. No associated soft tissue mass to suggest meningioma is noted.    H/O CT scan of chest  08/2020    CT: NO PE    H/O echocardiogram 04/2005    Trivial MR with no mitral valve prolapse. Normal LV fx, neg for ischemia 11/20: 1.left ventricular systolic function normal,60-65% est ejection fraction. 2. Poorly visualized anatomical struct due to subopt image. 3. Spectral Doppler shows an impaired relaxation pattern of left ventricular diastolic filling. 4. RVSP within normal limits. 5. No significant valvular pathology seen. 6. 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  No suspicious renal mass lesions are  noted in either kidney.    H/O magnetic resonance imaging of brain and brain stem 11/24/2023    Mild white-matter changes are nonspecific but most likely represent small-vessel ischemic disease in a patient of this age and also involve the gonzalo. No evidence of acute ischemic injury or intracranial mass effect.    H/O spine x-ray 06/22/2023    XR Sacrum: Acute angulation at the sacrococcygeal junction which may reflect a prior injury/fracture.    H/O x-ray of lumbar spine 06/22/2023    There is mild anterolisthesis of L3 on L4 and L4 on L5. There is mild multilevel endplate sclerosis and osteophytosis. There is moderate facet disease lower lumbar spine as well    History of colitis     CT 7/22 Stomach is decompressed, slightly limiting its evaluation with equivocal mucosal hyperemia and submucosal edema, somewhat suspicious for acute gastritis. There is mild prominence of mucosal folds and subjective mucosal hyperemia in small bowel, with mesenteric edema, suspicious for infectious/inflammatory enteritis.    History of colitis     pt 2: Colon is mostly decompressed, with small volume of stool in the proximal colon. Mucosal hyperemia in the colon, most pronounced involving the descending and rectosigmoid colon, with m    History of PFTs 11/2022    DLCO substantially reduced, air  trapping also    Holter monitor, abnormal 10/2022    Sinus karen/tachycardia. 42517, average 74. 741 PVCs (0.05% burden), 8565 PSVCs (0.58%), 23 occurrences of PSVT, longest 34 beats, fastest 162 1 reported event 8/22: HR 58381, average 94. Rare PACs, PVCs, no AF (40hours) 7 events recorded, all autotriggered    HPV (human papilloma virus) infection 1980    Urinary tract infection     Varicella      Past Surgical History:   Procedure Laterality Date    APPENDECTOMY      BREAST BIOPSY      Biopsy Breast Percutaneous Needle Core, 10/18: Right breast mass, needle localized excisional biopsy (A)  Proliferative epithelial changes including radial sclerosing lesions, sclerosing adenosis and usual ductal hyperplasia (please see comment)  Microcalcifications present in proliferative epithelial changes and unremarkable lobules    BREAST BIOPSY  10/2018    sclerosing lesions, sclerosing adenosis and usual ductal hyperplasia 1. Right breast mass, needle localized excisional biopsy (A)  Proliferative epithelial changes including radial sclerosing lesions, sclerosing adenosis and usual ductal hyperplasia. Microcalcifications present in proliferative epithelial changes and unremarkable lobules/Biopsy clip and biopsy site reparative changes identified    BREAST BIOPSY      pt 2:2. Right breast, new medial margin, excision (B)  Fibrofatty breast tissue 3. Left breast, needle localized excisional biopsy (C)  Proliferative epithelial changes including radial sclerosing lesions, sclerosing adenosis and usual ductal hyperplasia (please see comment)  Microcalcifications present in proliferative epithelial changes and unremarkable lobules    BREAST BIOPSY      pt 3: Biopsy clip and biopsy site reparative changes identified 4. Left breast, new medial margin, excision (D)  Proliferative epithelial changes    BREAST LUMPECTOMY      CARDIAC CATHETERIZATION  09/14/2020 9/13: normal POSTOPERATIVE DIAGNOSES: 1. Normal LEFT MAIN. 2. Normal  LAD. 3. Normal CIRCUMFLEX. 4. Normal RIGHT CORONARY ARTERY. 5. Normal left ventricular function, ejection fraction of 70%.    COLONOSCOPY  10/05/2021    1/11: Hemorrhoids; normal (8); diverticulosis, biopsy: (-) 9/2019: Diverticulosis, polyp:  Ascending colon, polypectomy (C) - Fragments of tubular adenoma.    COLONOSCOPY  03/28/2024    divertiuculosis    CONDYLOMA EXCISION/FULGURATION  1980    CYSTOSCOPY  10/05/2021    6/21: Cystoscopy Findings: atrophic vaginitis.  Cystoscopy today reveals a urethral stricture dilated to 20 Kenyan with minimal pain. Once inside the bladder there is severe erythema in the trigone. Mild trabeculations. No evidence of stones or tumors. Flow rate 9 cc/s, total volume 172 cc, PVR 61 cc.    ENDOMETRIAL ABLATION      ESOPHAGOGASTRODUODENOSCOPY  10/05/2021    6/21: Cystoscopy Findings: atrophic vaginitis.  Cystoscopy today reveals a urethral stricture dilated to 20 Kenyan with minimal pain. Once inside the bladder there is severe erythema in the trigone. Mild trabeculations. No evidence of stones or tumors. Flow rate 9 cc/s, total volume 172 cc, PVR 61 cc.    ESOPHAGOGASTRODUODENOSCOPY  03/28/2024    gastritis, healthy gastric bypass    FLEXOR TENDON OF FOREARM / WRIST REPAIR      OTHER SURGICAL HISTORY      Adjacent Tissue Transfer    POLYSOMNOGRAPHY  06/14/2023    PSG: Mild SHIMA, O2  90%, AHI 8.4. 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 (no drug eruption, lupus, etc)    TOTAL ABDOMINAL HYSTERECTOMY      Hysterectomy, TAHBSO (endometriosis and fibroids)    TUMOR EXCISION      Excision Of Leg Soft Tissue Tumor     Social History     Socioeconomic History    Marital status: Single     Spouse name: Not on file    Number of children: Not on file    Years of education: Not on file    Highest education level: Not on file   Occupational History    Not on file   Tobacco Use    Smoking status: Never     Passive exposure: Past     "Smokeless tobacco: Never   Vaping Use    Vaping status: Never Used   Substance and Sexual Activity    Alcohol use: Yes     Alcohol/week: 2.0 standard drinks of alcohol     Types: 2 Glasses of wine per week     Comment: Drink occasionally wine/beer with meals/friends.    Drug use: Never    Sexual activity: Not Currently     Partners: Male     Birth control/protection: Post-menopausal, Surgical     Comment: Complete hysterectomy in  when age 45.   Other Topics Concern    Not on file   Social History Narrative    Never     No kids    Retired    Nonsmoker    Social ETOH    ---    Family History:    M:  Alzheimer's Disease, Fell, broke hip/OP, tremor ( age 90)    F:  'old age' at 92    B:  None                                                          B:  Lipids, HTN                     B: Hearing Loss, HTN    S:  Anemia, dementia onset around 77 yo     S: oral CA, stroke ( age 55)    MUncle:  Colon Cancer    PAunts:Breast Cancer       x2 and cousin     Social Determinants of Health     Financial Resource Strain: Not on file   Food Insecurity: Not on file   Transportation Needs: Not on file   Physical Activity: Not on file   Stress: Not on file   Social Connections: Not on file   Intimate Partner Violence: Not on file   Housing Stability: Not on file     Cancer-related family history includes Breast cancer in her father's sister, father's sister, and paternal cousin; Cancer in her mother's brother; Colon cancer in her mother's brother.       PHYSICAL EXAM:  /72   Ht 1.702 m (5' 7\")   Wt 69.5 kg (153 lb 3.2 oz)   BMI 23.99 kg/m²   Physical examination:  General: No distress  Neck: No masses  Respiratory: No respiratory distress  Abdomen: soft nontender no hernias  No psoas tenderness  GYN: Normal vulvar skin normal clitoral hanna and clitoris labia majora and minora normal vaginal mucosa no lesions right-sided myofascial pelvic floor tenderness that reproduces symptoms limited hypertonicity " on the left side perianal area: without lesions        IMPRESSION/PLAN:  79 y.o. overactive bladder, nocturia, myofascial pelvic pain right pelvis    Start Myrbetriq  Pelvic floor therapy  Consider pelvic floor Botox or Greg Miller am scribing for virtually, and in the presence of Dr. Mac Dietz on  08/19/24 at 4:21 PM      Mac Dietz MD

## 2024-08-20 DIAGNOSIS — M62.89 HIGH-TONE PELVIC FLOOR DYSFUNCTION IN FEMALE: ICD-10-CM

## 2024-08-20 DIAGNOSIS — M53.3 SACROILIAC JOINT DYSFUNCTION: ICD-10-CM

## 2024-08-27 ENCOUNTER — PATIENT OUTREACH (OUTPATIENT)
Dept: PRIMARY CARE | Facility: CLINIC | Age: 79
End: 2024-08-27
Payer: COMMERCIAL

## 2024-08-27 DIAGNOSIS — I47.10 PAROXYSMAL SUPRAVENTRICULAR TACHYCARDIA (CMS-HCC): ICD-10-CM

## 2024-08-27 DIAGNOSIS — N18.2 CKD (CHRONIC KIDNEY DISEASE) STAGE 2, GFR 60-89 ML/MIN: ICD-10-CM

## 2024-08-27 DIAGNOSIS — R10.31 RLQ ABDOMINAL PAIN: ICD-10-CM

## 2024-08-27 PROCEDURE — 99490 CHRNC CARE MGMT STAFF 1ST 20: CPT | Performed by: INTERNAL MEDICINE

## 2024-08-27 NOTE — PROGRESS NOTES
Spoke with Adelia Dietz prescribed Myrbetriq  Very expensive and she is concerned for side effects  Will be starting pelvic floor therapy   Follow up with Dr. Dietz in October    Has not tried increase of metoprolol yet  Tremors are about the same  Has follow up with Dr. Barone in November  She will consider trying increased dose this month  Currently getting 30 days of 25mg metoprolol XL  We discussed sending 90 day supply instead  But also discussed she may want to trial the 50mg  Will discuss again next month

## 2024-08-29 ENCOUNTER — PATIENT MESSAGE (OUTPATIENT)
Dept: PRIMARY CARE | Facility: CLINIC | Age: 79
End: 2024-08-29
Payer: COMMERCIAL

## 2024-08-30 ENCOUNTER — OFFICE VISIT (OUTPATIENT)
Dept: PRIMARY CARE | Facility: CLINIC | Age: 79
End: 2024-08-30
Payer: COMMERCIAL

## 2024-08-30 ENCOUNTER — APPOINTMENT (OUTPATIENT)
Dept: PRIMARY CARE | Facility: CLINIC | Age: 79
End: 2024-08-30
Payer: COMMERCIAL

## 2024-08-30 VITALS
SYSTOLIC BLOOD PRESSURE: 107 MMHG | TEMPERATURE: 96.8 F | WEIGHT: 155.6 LBS | DIASTOLIC BLOOD PRESSURE: 62 MMHG | OXYGEN SATURATION: 97 % | BODY MASS INDEX: 24.37 KG/M2 | HEART RATE: 68 BPM

## 2024-08-30 DIAGNOSIS — R68.83 CHILLS (WITHOUT FEVER): Primary | ICD-10-CM

## 2024-08-30 PROCEDURE — 1123F ACP DISCUSS/DSCN MKR DOCD: CPT | Performed by: NURSE PRACTITIONER

## 2024-08-30 PROCEDURE — 1160F RVW MEDS BY RX/DR IN RCRD: CPT | Performed by: NURSE PRACTITIONER

## 2024-08-30 PROCEDURE — 81003 URINALYSIS AUTO W/O SCOPE: CPT

## 2024-08-30 PROCEDURE — 87635 SARS-COV-2 COVID-19 AMP PRB: CPT

## 2024-08-30 PROCEDURE — 1036F TOBACCO NON-USER: CPT | Performed by: NURSE PRACTITIONER

## 2024-08-30 PROCEDURE — 1159F MED LIST DOCD IN RCRD: CPT | Performed by: NURSE PRACTITIONER

## 2024-08-30 PROCEDURE — 1157F ADVNC CARE PLAN IN RCRD: CPT | Performed by: NURSE PRACTITIONER

## 2024-08-30 PROCEDURE — 99213 OFFICE O/P EST LOW 20 MIN: CPT | Performed by: NURSE PRACTITIONER

## 2024-08-30 RX ORDER — CIPROFLOXACIN 250 MG/1
250 TABLET, FILM COATED ORAL 2 TIMES DAILY
Qty: 10 TABLET | Refills: 0 | Status: SHIPPED | OUTPATIENT
Start: 2024-08-30 | End: 2024-09-04

## 2024-08-30 NOTE — PROGRESS NOTES
"Problem List Items Addressed This Visit    None  Visit Diagnoses       Chills (without fever)    -  Primary    she is improving today day #7  covid sent and also urine  i did send wait & see cipro if she worsens or if develops concerning UTI sx    Relevant Medications    ciprofloxacin (Cipro) 250 mg tablet    Other Relevant Orders    Sars-CoV-2 PCR    Urinalysis with Reflex Culture and Microscopic             Subjective   Patient ID: Adelia Munson is a 79 y.o. female who presents for Sick Visit (Home tested 2 days ago for covid - negative /Experiencing chills /Requesting covid test /Sx's started last Saturday ).  HPI  Sx onset x 7 days  Chills improved today  No fever  No cough or sore throat  No ear pain  No rash  No vmtg or diarrhea    Review of Systems   All other systems reviewed and are negative.      BP Readings from Last 3 Encounters:   08/30/24 107/62   08/19/24 120/72   06/25/24 123/74      Wt Readings from Last 3 Encounters:   08/30/24 70.6 kg (155 lb 9.6 oz)   08/19/24 69.5 kg (153 lb 3.2 oz)   06/25/24 69.4 kg (153 lb)      BMI:   Estimated body mass index is 24.37 kg/m² as calculated from the following:    Height as of 8/19/24: 1.702 m (5' 7\").    Weight as of this encounter: 70.6 kg (155 lb 9.6 oz).    Objective   Physical Exam  Constitutional:       General: She is not in acute distress.  HENT:      Head: Normocephalic and atraumatic.      Right Ear: Tympanic membrane and external ear normal.      Left Ear: Tympanic membrane and external ear normal.      Nose: Nose normal.      Mouth/Throat:      Mouth: Mucous membranes are moist.   Eyes:      Extraocular Movements: Extraocular movements intact.      Conjunctiva/sclera: Conjunctivae normal.   Cardiovascular:      Rate and Rhythm: Normal rate and regular rhythm.      Pulses: Normal pulses.   Pulmonary:      Effort: Pulmonary effort is normal.      Breath sounds: Normal breath sounds.   Abdominal:      General: Bowel sounds are normal.      " Palpations: Abdomen is soft.   Musculoskeletal:         General: Normal range of motion.      Cervical back: Normal range of motion and neck supple.   Skin:     General: Skin is warm and dry.   Neurological:      General: No focal deficit present.      Mental Status: She is alert.   Psychiatric:         Mood and Affect: Mood normal.

## 2024-08-31 LAB
APPEARANCE UR: CLEAR
BILIRUB UR STRIP.AUTO-MCNC: NEGATIVE MG/DL
COLOR UR: COLORLESS
GLUCOSE UR STRIP.AUTO-MCNC: NORMAL MG/DL
HOLD SPECIMEN: NORMAL
KETONES UR STRIP.AUTO-MCNC: NEGATIVE MG/DL
LEUKOCYTE ESTERASE UR QL STRIP.AUTO: NEGATIVE
NITRITE UR QL STRIP.AUTO: NEGATIVE
PH UR STRIP.AUTO: 6.5 [PH]
PROT UR STRIP.AUTO-MCNC: NEGATIVE MG/DL
RBC # UR STRIP.AUTO: NEGATIVE /UL
SARS-COV-2 ORF1AB RESP QL NAA+PROBE: NOT DETECTED
SP GR UR STRIP.AUTO: 1
UROBILINOGEN UR STRIP.AUTO-MCNC: NORMAL MG/DL

## 2024-09-03 PROCEDURE — RXMED WILLOW AMBULATORY MEDICATION CHARGE

## 2024-09-09 ENCOUNTER — PHARMACY VISIT (OUTPATIENT)
Dept: PHARMACY | Facility: CLINIC | Age: 79
End: 2024-09-09
Payer: MEDICARE

## 2024-09-18 NOTE — PROGRESS NOTES
PELVIC FLOOR PHYSICAL THERAPY EVALUATION AND TREATMENT    Patient Name: Adelia Munson  MRN: [id number]  Today's Date: 9/19/2024  Time Calculation  Start Time: 0945  Stop Time: 1045  Time Calculation (min): 60 min    INSURANCE:  Visit number: 1  Payor: [employer] / Plan: [employer] / Product Type: *No Product type* /   $40 COPAY VISITS ARE MED NEC NO AUTH NEEDED PAYS % OOP 3800.00 1195.00 APPLIED   Referred by: Mac Dietz MD     PT Evaluation Time Entry  PT Evaluation (Low) Time Entry: 30  PT Therapeutic Procedures Time Entry  Manual Therapy Time Entry: 15  Therapeutic Activity Time Entry: 15                     ASSESSMENT:  Adelia Munson  is a 79 y.o. old patient who participated in a pelvic floor physical therapy evaluation today due to abdominal (RLQ) and pelvic pain.  Pt presents with signs and symptoms consistent with overactive pelvic floor/myofascial tension. Her impairments include: nocturia , slow stream, straining to void, feeling of incomplete emptying, pelvic pain, abdominal pain, pelvic floor hypertonicity, difficulty relaxing pelvic floor muscles, and hip strength. Due to these impairments, she has the following functional limitations and participation restrictions: sleep, household/recreational activities, wearing desired clothing, and increased risk of developing UTIs. Adelia Munson 's clinical presentation is stable and/or uncomplicated characteristics with the level of complexity being low.  her potential for rehab is good. Skilled physical therapy services are appropriate and beneficial in order to achieve measurable and meaningful change in stated objective tests and measures. Utilization of skilled physical therapy services will aid in advancing her functional status and attaining her therapy-related goals. The patient verbalized understanding and is in agreement with all goals and plan of care. Plan of care was developed with input and agreement by the  "patient    PT Assessment Results: Decreased strength, Pain  Rehab Prognosis: Good    PLAN: assess response to internal stretching/pelvic wand; assess abdomen-STM?; pelvic floor down-training  Treatment/Interventions: Biofeedback, Cryotherapy, Education/ Instruction, Dry needling, Electrical stimulation, Gait training, Hot pack, Manual therapy, Neuromuscular re-education, Self care/ home management, Taping techniques, Therapeutic activities, Therapeutic exercises  PT Plan: Skilled PT  PT Frequency: 1 time per week  Duration: 8 weeks  Rehab Potential: Good  Plan of Care Agreement: Patient        THERAPY DIAGNOSIS:  1. High-tone pelvic floor dysfunction in female  Referral to Physical Therapy    Follow Up In Physical Therapy      2. Sacroiliac joint dysfunction  Referral to Physical Therapy    Follow Up In Physical Therapy          SUBJECTIVE:  Pelvic History  Adelia Munson reports persistent lower abdominal pain since summer 2023. Initially thought it was stemming from ascending colon- had colonoscopy among other exams all which were WNL. Referred to OBGYN who reported significant R sided tension in levator ani. Also feels that R hip is lower compared to L side. Experiencing difficulty emptying bladder in addition to pain.     PLOF: mild abdominal discomfort & bladder issues that did not interfere with ADLs/IADLs  CLOF: intermittent and intense abdominal discomfort limiting full participation in household chores and sleep daily    Functional limitations: wearing desired clothing, sleeping    Home Environment: 2nd floor apartment with elevator access  Occupation: Retired  Hobbies: reading    Pain location: Abdomen- RLQ  Pain description: nagging  Pain ratin/10 current; 10/10 at worst  Sx worse with: bending forward, wearing tight clothing, laying on R side  Sx better with: rest  Prior treatments/interventions: none  Pt stated goals: \"get rid or at least lessen pain\"    Pelvic Pain  Description:   Location: R " lower quadrant  Duration: minutes  Frequency: daily but intermittent  Since onset, the symptoms are: gradually worse  Pain when emptying bladder: no  Pain with tight clothing: yes  History of back pain: yes- recently completed PT at [facility]    Bladder  Excessive Urinary Urgency: denies  Daytime Voiding Frequency: every 2-3 hours  Nighttime Voiding Frequency: 3-5x night  Unintentional urine loss frequency: denies  Difficulty initiating flow of urine: sometimes  Slow/weak urine stream: yes  Push to urinate: yes  Able to completely empty bladder: no  Tests performed by doctor: cystoscopy years ago-pt believes it was normal  Frequent UTI's: no    Bowel Screen  BM frequency: 1-2x daily  Frequent diarrhea: no  Frequent constipation/straining/incomplete emptying: no  Excessive Bowel Urgency: no    Exercise  Current exercise regime: no  Do you do Kegels? no       MEDICAL HISTORY FORM:  Reviewed (scanned into chart)  Denies unexpected weight loss/gain, night sweats, or night pain  Previous surgeries include: hysterectomy in 1990      PRECAUTIONS:   Fall risk: low- uses device PRN  (+) essential tremors, blood thinners  Denies CA, pacemaker, DM, HTN, latex allergy, epilepsy/seizures, or other known cardio/neuro/pulmonary problems       OBJECTIVE  *All internal examination performed with informed consent*    Voluntary Pelvic Floor Relaxation  complete    Pelvic Floor MMT Grade    3/fair: squeeze pressure (contraction) and lift or displacement  *required extended time/cueing for appropriate muscle contraction; continued to use abdominal and glute compensation    Pelvic Floor Clock: (+) Pain and/or Tightness  Pelvic floor muscle layers 2-3 celena (R>L side)    LE MMT: L and R  Hip flex: 4+/5 and 4+/5  Hip ABD: 5/5 and 5/5  Hip ADD: 4+/5 and 4+/5  Knee ext: 5/5 and 5/5  Knee flex: 5/5 and 5/5  Ankle DF: 5/5 and 5/5  Ankle PF: 5/5 and 5/5    OUTCOME MEASURES  Other Measures  Other Outcome Measures: Female NIH-CPSI:  25      TREATMENTS:    Therapeutic Activity x 15 minutes, 1 unit   Education provided on pelvic wand use (recommended intimate senia) or internal stretchin-7x weekly for 5-10 mins  Purpose/rationale to reduce pelvic floor hypertonicity  Typical response interventions: mild increase in pain during performance, may have soreness after  Answered all questions regarding recommended products    Manual Therapy x 15 minutes, 1 unit   Manual pelvic floor trigger point release in muscle layers 2-3 (performed by therapist with informed pt consent). Pt in hooklying position- voiced celena ADD discomfort at end of session        EDUCATION:  Reviewed home exercise program. See above        GOALS:   STG (to be achieved in 3 weeks)  Adelia E Tarase will independently perform HEP as assigned to promote self-management of symptoms and continue making functional gains outside of physical therapy.     LTG (to be achieved by discharge)  Adelia E Tarase will decrease NIH-CPSI raw score by at least 6 points (MCID) to demonstrate improved quality of life.   Adelia E Tarase will report 0/10 pain with wearing fitted clothing.  Adelia E Tarase will be able to unload  with no >2/10 abdominal pain.   Adelia E Tarase will be able to lay on R side with at least 75% reduction in symptoms to assist with sleeping.

## 2024-09-19 ENCOUNTER — EVALUATION (OUTPATIENT)
Dept: PHYSICAL THERAPY | Facility: CLINIC | Age: 79
End: 2024-09-19
Payer: COMMERCIAL

## 2024-09-19 ENCOUNTER — PATIENT MESSAGE (OUTPATIENT)
Dept: PRIMARY CARE | Facility: CLINIC | Age: 79
End: 2024-09-19

## 2024-09-19 DIAGNOSIS — M17.11 PRIMARY OSTEOARTHRITIS OF RIGHT KNEE: Primary | ICD-10-CM

## 2024-09-19 DIAGNOSIS — M53.3 SACROILIAC JOINT DYSFUNCTION: ICD-10-CM

## 2024-09-19 DIAGNOSIS — M62.89 HIGH-TONE PELVIC FLOOR DYSFUNCTION IN FEMALE: ICD-10-CM

## 2024-09-19 PROCEDURE — 97140 MANUAL THERAPY 1/> REGIONS: CPT | Mod: GP

## 2024-09-19 PROCEDURE — 97161 PT EVAL LOW COMPLEX 20 MIN: CPT | Mod: GP

## 2024-09-19 PROCEDURE — 97530 THERAPEUTIC ACTIVITIES: CPT | Mod: GP

## 2024-09-19 SDOH — ECONOMIC STABILITY: FOOD INSECURITY: WITHIN THE PAST 12 MONTHS, THE FOOD YOU BOUGHT JUST DIDN'T LAST AND YOU DIDN'T HAVE MONEY TO GET MORE.: NEVER TRUE

## 2024-09-19 SDOH — ECONOMIC STABILITY: FOOD INSECURITY: WITHIN THE PAST 12 MONTHS, YOU WORRIED THAT YOUR FOOD WOULD RUN OUT BEFORE YOU GOT MONEY TO BUY MORE.: NEVER TRUE

## 2024-09-19 ASSESSMENT — ENCOUNTER SYMPTOMS
DEPRESSION: 0
OCCASIONAL FEELINGS OF UNSTEADINESS: 0
LOSS OF SENSATION IN FEET: 0

## 2024-09-19 ASSESSMENT — PATIENT HEALTH QUESTIONNAIRE - PHQ9
10. IF YOU CHECKED OFF ANY PROBLEMS, HOW DIFFICULT HAVE THESE PROBLEMS MADE IT FOR YOU TO DO YOUR WORK, TAKE CARE OF THINGS AT HOME, OR GET ALONG WITH OTHER PEOPLE: SOMEWHAT DIFFICULT
1. LITTLE INTEREST OR PLEASURE IN DOING THINGS: SEVERAL DAYS
SUM OF ALL RESPONSES TO PHQ9 QUESTIONS 1 AND 2: 2
2. FEELING DOWN, DEPRESSED OR HOPELESS: SEVERAL DAYS

## 2024-09-23 NOTE — PROGRESS NOTES
PELVIC FLOOR PHYSICAL THERAPY TREATMENT NOTE    Patient Name:  Adelia Munson   Patient MRN: [id number]  Date: 09/26/24  Time Calculation  Start Time: 1005  Stop Time: 1046  Time Calculation (min): 41 min    Insurance:  Visit number: 2 (updated 9/26/2024)   Payor: [employer] / Plan: [employer] / Product Type: *No Product type* /   $40 COPAY VISITS ARE MED NEC NO AUTH NEEDED PAYS % OOP 3800.00 1195.00 APPLIED   Referred by: Mac Dietz MD        PT Therapeutic Procedures Time Entry  Manual Therapy Time Entry: 35  Therapeutic Exercise Time Entry: 6                     Assessment:  Progress towards functional goals: Reduced pain level  Response to interventions: Mild tightness present in RLQ vs LLQ during abdominal assessment. TTP and trigger points identified/addressed along R iliac crest. Educated pt that increased soreness may be present post-STM but should not exceed 2 days, pt verbalized understanding. Mod limitations in celena hip ADD flexibility-initiated hip stretching. Instructed pt to bring pelvic wand to next session due to confusion using device, pt agreeable. Adelia Munson left session with all questions answered and no reported increase in symptoms.   Justification for continued skilled care: .Assess effectiveness of home exercise program. Reduce abdominal and pelvic pain.     Plan: vaginal inhibitation/down-training    Therapy diagnoses:   1. High-tone pelvic floor dysfunction        2. Sacroiliac dysfunction             Subjective:  Adelia reports she feels like her condition is improving. Progress towards functional goal: Reduced pain level. Felt moderate reduction in symptoms after manual trigger point release performed last session. However, pt questioning if she is performing trigger point release correctly on her own due to no change in symptoms. Did not bring wand to session.   Pain Location RLQ abdomen  Pain (0-10): 0   HEP adherence / understanding: compliance with  "the instructed home exercises.    Precautions:  Fall risk: low- uses device PRN  (+) essential tremors, blood thinners  Denies CA, pacemaker, DM, HTN, latex allergy, epilepsy/seizures, or other known cardio/neuro/pulmonary problems       Objective:  Abdominal Assessment  TTP along R iliac crest  Mild tension in RLQ vs LLQ        Treatment Performed: (\"NP\" = Not Performed)   Therapeutic Exercise:  - Standing hip flex 3x30 sec each side  - Supine reclined butterfly, 3x30 sec     Manual Therapy:  - STM over abdominal RLQ. Performed with pt in supine    NP this session  Therapeutic Activities:  Neuromuscular Re-Education:   Gait Training:  Modalities:       Education: Reviewed home exercise program. Provided verbal feedback to improve exercise technique. Provided manual cues for correct performance of exercises  "

## 2024-09-25 ENCOUNTER — OFFICE VISIT (OUTPATIENT)
Dept: PRIMARY CARE | Facility: CLINIC | Age: 79
End: 2024-09-25
Payer: COMMERCIAL

## 2024-09-25 VITALS
WEIGHT: 153.6 LBS | DIASTOLIC BLOOD PRESSURE: 65 MMHG | TEMPERATURE: 97.6 F | BODY MASS INDEX: 24.06 KG/M2 | OXYGEN SATURATION: 95 % | HEART RATE: 68 BPM | SYSTOLIC BLOOD PRESSURE: 106 MMHG

## 2024-09-25 DIAGNOSIS — R44.3 HALLUCINATIONS: Primary | ICD-10-CM

## 2024-09-25 PROCEDURE — 1160F RVW MEDS BY RX/DR IN RCRD: CPT | Performed by: NURSE PRACTITIONER

## 2024-09-25 PROCEDURE — 1036F TOBACCO NON-USER: CPT | Performed by: NURSE PRACTITIONER

## 2024-09-25 PROCEDURE — 1159F MED LIST DOCD IN RCRD: CPT | Performed by: NURSE PRACTITIONER

## 2024-09-25 PROCEDURE — 99214 OFFICE O/P EST MOD 30 MIN: CPT | Performed by: NURSE PRACTITIONER

## 2024-09-25 PROCEDURE — 1123F ACP DISCUSS/DSCN MKR DOCD: CPT | Performed by: NURSE PRACTITIONER

## 2024-09-25 PROCEDURE — 1157F ADVNC CARE PLAN IN RCRD: CPT | Performed by: NURSE PRACTITIONER

## 2024-09-25 PROCEDURE — G2211 COMPLEX E/M VISIT ADD ON: HCPCS | Performed by: NURSE PRACTITIONER

## 2024-09-25 ASSESSMENT — MINI MENTAL STATE EXAM
SPELL THE WORD WORLD FORWARD AND BACKWARDS OR SERIAL 7S: 5 CORRECT
HAND THE PERSON A PENCIL AND PAPER. SAY:  WRITE ANY COMPLETE SENTENCE ON THAT PIECE OF PAPER. (NOTE: THE SENTENCE MUST MAKE SENSE.  IGNORE SPELLING ERRORS): 1 CORRECT
PLACE DESIGN, ERASER AND PENCIL IN FRONT OF THE PERSON.  SAY:  COPY THIS DESIGN PLEASE.  SHOW: DESIGN. ALLOW: MULTIPLE TRIES. WAIT UNTIL PERSON IS FINISHED AND HANDS IT BACK. SCORE: ONLY FOR DIAGRAM WITH 4-SIDED FIGURE BETWEEN TWO 5-SIDED FIGURES: 1 CORRECT
RECALL THE 3 OBJECTS FROM ABOVE (APPLE, TABLE, PENNY) OR (BALL, TREE, FLAG): 3 CORRECT
PLEASE COPY THIS PICTURE (NOTE ALL 10 ANGLES MUST BE PRESENT AND TWO MUST INTERSECT): 1 CORRECT
SHOW: PENCIL [OBJECT] ASK: WHAT IS THIS CALLED?: 2 CORRECT
WHAT IS THE YEAR, SEASON, DATE, DAY, AND MONTH: 5 CORRECT
SAY: I WOULD LIKE YOU TO REPEAT THIS PHRASE AFTER ME: NO IFS, ANDS, OR BUTS.: 1 CORRECT
NAME OR REPEAT 3 OBJECTS - (APPLE, TABLE, PENNY) OR (BALL, TREE, FLAG): 3 CORRECT
WHAT STATE, COUNTRY, CITY, HOSPITAL, FLOOR: 5 CORRECT
SUM ALL MMSE QUESTIONS FOR TOTAL SCORE [OUT OF 30].: 30
SAY:  READ THE WORDS ON THE PAGE AND THEN DO WHAT IT SAYS.  THEN HAND THE PERSON THE SHEET WITH CLOSE YOUR EYES ON IT.  IF THE SUBJECT READS AND DOES NOT CLOSE THEIR EYES, REPEAT UP TO THREE TIMES.  SCORE ONLY IF SUBJECT CLOSES EYES.: 3 CORRECT

## 2024-09-25 NOTE — Clinical Note
I am Dr Gracie Tim's NP. Wanted to forward you my note of a mutual patient, you have an appt with her 11/21/24.  Let me know if you would like me to order any additional studies prior to her appt with you.

## 2024-09-25 NOTE — PROGRESS NOTES
Subjective   Patient ID: Adeliaela Munson is a 79 y.o. female who presents for folllow up and Hallucinations.    Hallucinations  1-2 in Dec  A few in January  Weaned off lexapro thinking it was the problem  Then hallucinations recently returned  4x  A man, a woman, a dog, and a hand  Always in middle of night when she would wake up  Does not last long  Never during the day  Under a lot of stress  Sister in [place] has dementia, started mid 70s  Mother had alzheimer's started in her 80s,  at 90  And Maternal aunt had alzheimers, onset mid to late 80s  age mid 90s  Mother, grandma, sister and 3 brothers all have ET in hands  No parkinsons  No recent confusion  No fever sweats   Had chills for a week and then resolved  No UTI 1 mo ago  No other symptoms of feeling unwell  Does have appt with Dr Barone        Review of Systems  ROS completely negative except what was mentioned in the HPI.  Problem List, surgical, social, and family histories which were reviewed and updated as necessary within the EMR. I also personally reviewed the notes, labs, and imaging that pertained to what was documented or discussed in the HPI.    Objective   Physical Exam  Vitals and nursing note reviewed.   Constitutional:       General: She is not in acute distress.     Appearance: Normal appearance.   HENT:      Head: Normocephalic and atraumatic.      Right Ear: Tympanic membrane, ear canal and external ear normal.      Left Ear: Tympanic membrane, ear canal and external ear normal.      Nose: Nose normal.      Mouth/Throat:      Mouth: Mucous membranes are moist.      Pharynx: Oropharynx is clear.   Eyes:      Extraocular Movements: Extraocular movements intact.      Conjunctiva/sclera: Conjunctivae normal.      Pupils: Pupils are equal, round, and reactive to light.   Cardiovascular:      Rate and Rhythm: Normal rate and regular rhythm.      Pulses: Normal pulses.      Heart sounds: Normal heart sounds.   Pulmonary:      Effort:  Pulmonary effort is normal.      Breath sounds: Normal breath sounds.   Musculoskeletal:         General: Normal range of motion.      Cervical back: Normal range of motion and neck supple.   Lymphadenopathy:      Cervical: No cervical adenopathy.   Skin:     General: Skin is warm and dry.   Neurological:      General: No focal deficit present.      Mental Status: She is alert and oriented to person, place, and time. Mental status is at baseline.      Cranial Nerves: No cranial nerve deficit.      Sensory: No sensory deficit.      Motor: No weakness.      Coordination: Coordination normal.      Deep Tendon Reflexes: Reflexes normal.      Comments: +Head tremor and bilateral hand tremor; mild shuffling gait   Psychiatric:         Mood and Affect: Mood normal.         Behavior: Behavior normal.         Thought Content: Thought content normal.         Judgment: Judgment normal.         /65   Pulse 68   Temp 36.4 °C (97.6 °F) (Temporal)   Wt 69.7 kg (153 lb 9.6 oz)   SpO2 95%   BMI 24.06 kg/m²     Assessment/Plan    Problem List Items Addressed This Visit       Hallucinations - Primary    Overview     11/24/23 MRI brain: Mild white-matter changes are nonspecific but most likely represent small-vessel ischemic disease in a patient of this age and also involve the gonzalo. No evidence of acute ischemic injury or intracranial mass effect.  Onset 12/2023 - always in middle of the night         Current Assessment & Plan     Started in Dec '23 and Jan '24, stopped lexapro thinking this was the cause  Hallucinations restarted this Sept 4 episode -  middle of the night, saw a man, a woman, dog, and a hand  Electrolyte imbalance or infection? She will get routine fasting labs that have already been ordered as she prefers not having to do separate labs   Medications? Did not start mybetric; nexium has postmarketing adverse reaction of hallucination; consider stopping after seeing psych and neuro  Parkinson? No fhx of,  does have ET, mild shuffling gait  Early sign of LB dementia? Mother, maternal aunt, and sister have alzheimer/dementia; MMSE today 30/30  Brain lesion?  Reviewed MRI brain from 11/2023; She has a neurology appt upcoming, consider repeat brain imaging  Psychiatric?  She does have a psychiatry appt upcoming

## 2024-09-26 ENCOUNTER — TREATMENT (OUTPATIENT)
Dept: PHYSICAL THERAPY | Facility: CLINIC | Age: 79
End: 2024-09-26
Payer: COMMERCIAL

## 2024-09-26 DIAGNOSIS — M53.3 SACROILIAC DYSFUNCTION: ICD-10-CM

## 2024-09-26 DIAGNOSIS — M62.89 HIGH-TONE PELVIC FLOOR DYSFUNCTION: Primary | ICD-10-CM

## 2024-09-26 PROBLEM — R44.3 HALLUCINATIONS: Status: ACTIVE | Noted: 2024-09-26

## 2024-09-26 PROCEDURE — 97110 THERAPEUTIC EXERCISES: CPT | Mod: GP

## 2024-09-26 PROCEDURE — 97140 MANUAL THERAPY 1/> REGIONS: CPT | Mod: GP

## 2024-09-27 NOTE — ASSESSMENT & PLAN NOTE
Started in Dec '23 and Jan '24, stopped lexapro thinking this was the cause  Hallucinations restarted this Sept  4 episode -  middle of the night, saw a man, a woman, dog, and a hand  Electrolyte imbalance or infection? She will get routine fasting labs that have already been ordered as she prefers not having to do separate labs   Medications? Did not start mybetric; nexium has postmarketing adverse reaction of hallucination; consider stopping after seeing psych and neuro  Parkinson? No fhx of, does have ET, mild shuffling gait  Early sign of LB dementia? Mother, maternal aunt, and sister have alzheimer/dementia; MMSE today 30/30  Brain lesion?  Reviewed MRI brain from 11/2023; She has a neurology appt upcoming, consider repeat brain imaging  Psychiatric?  She does have a psychiatry appt upcoming

## 2024-09-30 ENCOUNTER — PATIENT OUTREACH (OUTPATIENT)
Dept: PRIMARY CARE | Facility: CLINIC | Age: 79
End: 2024-09-30

## 2024-09-30 ENCOUNTER — LAB (OUTPATIENT)
Dept: LAB | Facility: LAB | Age: 79
End: 2024-09-30
Payer: COMMERCIAL

## 2024-09-30 DIAGNOSIS — G89.29 CHRONIC NONINTRACTABLE HEADACHE, UNSPECIFIED HEADACHE TYPE: ICD-10-CM

## 2024-09-30 DIAGNOSIS — N18.2 CKD (CHRONIC KIDNEY DISEASE) STAGE 2, GFR 60-89 ML/MIN: ICD-10-CM

## 2024-09-30 DIAGNOSIS — R51.9 CHRONIC NONINTRACTABLE HEADACHE, UNSPECIFIED HEADACHE TYPE: ICD-10-CM

## 2024-09-30 DIAGNOSIS — F32.1 CURRENT MODERATE EPISODE OF MAJOR DEPRESSIVE DISORDER WITHOUT PRIOR EPISODE (MULTI): ICD-10-CM

## 2024-09-30 DIAGNOSIS — R15.2 FECAL URGENCY: ICD-10-CM

## 2024-09-30 DIAGNOSIS — F41.9 ANXIETY: ICD-10-CM

## 2024-09-30 DIAGNOSIS — M17.11 PRIMARY OSTEOARTHRITIS OF RIGHT KNEE: ICD-10-CM

## 2024-09-30 DIAGNOSIS — I48.0 PAROXYSMAL ATRIAL FIBRILLATION (MULTI): ICD-10-CM

## 2024-09-30 DIAGNOSIS — E53.8 VITAMIN B12 DEFICIENCY: ICD-10-CM

## 2024-09-30 DIAGNOSIS — I47.10 PAROXYSMAL SUPRAVENTRICULAR TACHYCARDIA (CMS-HCC): ICD-10-CM

## 2024-09-30 DIAGNOSIS — E55.9 VITAMIN D DEFICIENCY: ICD-10-CM

## 2024-09-30 LAB
25(OH)D3 SERPL-MCNC: 51 NG/ML (ref 30–100)
ALBUMIN SERPL BCP-MCNC: 4.2 G/DL (ref 3.4–5)
ALP SERPL-CCNC: 27 U/L (ref 33–136)
ALT SERPL W P-5'-P-CCNC: 13 U/L (ref 7–45)
ANION GAP SERPL CALC-SCNC: 11 MMOL/L (ref 10–20)
APPEARANCE UR: CLEAR
AST SERPL W P-5'-P-CCNC: 17 U/L (ref 9–39)
BASOPHILS # BLD AUTO: 0.02 X10*3/UL (ref 0–0.1)
BASOPHILS NFR BLD AUTO: 0.5 %
BILIRUB SERPL-MCNC: 0.5 MG/DL (ref 0–1.2)
BILIRUB UR STRIP.AUTO-MCNC: NEGATIVE MG/DL
BUN SERPL-MCNC: 17 MG/DL (ref 6–23)
CALCIUM SERPL-MCNC: 9.9 MG/DL (ref 8.6–10.6)
CHLORIDE SERPL-SCNC: 104 MMOL/L (ref 98–107)
CHOLEST SERPL-MCNC: 183 MG/DL (ref 0–199)
CHOLESTEROL/HDL RATIO: 2.9
CO2 SERPL-SCNC: 32 MMOL/L (ref 21–32)
COLOR UR: NORMAL
CREAT SERPL-MCNC: 0.94 MG/DL (ref 0.5–1.05)
CRP SERPL-MCNC: 0.92 MG/DL
EGFRCR SERPLBLD CKD-EPI 2021: 62 ML/MIN/1.73M*2
EOSINOPHIL # BLD AUTO: 0.09 X10*3/UL (ref 0–0.4)
EOSINOPHIL NFR BLD AUTO: 2.1 %
ERYTHROCYTE [DISTWIDTH] IN BLOOD BY AUTOMATED COUNT: 12.8 % (ref 11.5–14.5)
ERYTHROCYTE [SEDIMENTATION RATE] IN BLOOD BY WESTERGREN METHOD: 10 MM/H (ref 0–30)
GLUCOSE SERPL-MCNC: 90 MG/DL (ref 74–99)
GLUCOSE UR STRIP.AUTO-MCNC: NORMAL MG/DL
HCT VFR BLD AUTO: 40.8 % (ref 36–46)
HDLC SERPL-MCNC: 62.5 MG/DL
HGB BLD-MCNC: 13.4 G/DL (ref 12–16)
IMM GRANULOCYTES # BLD AUTO: 0.02 X10*3/UL (ref 0–0.5)
IMM GRANULOCYTES NFR BLD AUTO: 0.5 % (ref 0–0.9)
KETONES UR STRIP.AUTO-MCNC: NEGATIVE MG/DL
LDLC SERPL CALC-MCNC: 99 MG/DL
LEUKOCYTE ESTERASE UR QL STRIP.AUTO: NEGATIVE
LYMPHOCYTES # BLD AUTO: 1.01 X10*3/UL (ref 0.8–3)
LYMPHOCYTES NFR BLD AUTO: 24 %
MCH RBC QN AUTO: 30.2 PG (ref 26–34)
MCHC RBC AUTO-ENTMCNC: 32.8 G/DL (ref 32–36)
MCV RBC AUTO: 92 FL (ref 80–100)
MONOCYTES # BLD AUTO: 0.46 X10*3/UL (ref 0.05–0.8)
MONOCYTES NFR BLD AUTO: 11 %
NEUTROPHILS # BLD AUTO: 2.6 X10*3/UL (ref 1.6–5.5)
NEUTROPHILS NFR BLD AUTO: 61.9 %
NITRITE UR QL STRIP.AUTO: NEGATIVE
NON HDL CHOLESTEROL: 121 MG/DL (ref 0–149)
NRBC BLD-RTO: 0 /100 WBCS (ref 0–0)
PH UR STRIP.AUTO: 6.5 [PH]
PLATELET # BLD AUTO: 214 X10*3/UL (ref 150–450)
POTASSIUM SERPL-SCNC: 4.4 MMOL/L (ref 3.5–5.3)
PROT SERPL-MCNC: 6.6 G/DL (ref 6.4–8.2)
PROT UR STRIP.AUTO-MCNC: NEGATIVE MG/DL
RBC # BLD AUTO: 4.43 X10*6/UL (ref 4–5.2)
RBC # UR STRIP.AUTO: NEGATIVE /UL
SODIUM SERPL-SCNC: 143 MMOL/L (ref 136–145)
SP GR UR STRIP.AUTO: 1.01
TRIGL SERPL-MCNC: 109 MG/DL (ref 0–149)
TSH SERPL-ACNC: 1 MIU/L (ref 0.44–3.98)
UROBILINOGEN UR STRIP.AUTO-MCNC: NORMAL MG/DL
VIT B12 SERPL-MCNC: 563 PG/ML (ref 211–911)
VLDL: 22 MG/DL (ref 0–40)
WBC # BLD AUTO: 4.2 X10*3/UL (ref 4.4–11.3)

## 2024-09-30 PROCEDURE — 85025 COMPLETE CBC W/AUTO DIFF WBC: CPT

## 2024-09-30 PROCEDURE — 85652 RBC SED RATE AUTOMATED: CPT

## 2024-09-30 PROCEDURE — 36415 COLL VENOUS BLD VENIPUNCTURE: CPT

## 2024-09-30 PROCEDURE — 99490 CHRNC CARE MGMT STAFF 1ST 20: CPT | Performed by: INTERNAL MEDICINE

## 2024-09-30 PROCEDURE — 80053 COMPREHEN METABOLIC PANEL: CPT

## 2024-09-30 PROCEDURE — 82306 VITAMIN D 25 HYDROXY: CPT

## 2024-09-30 PROCEDURE — 80061 LIPID PANEL: CPT

## 2024-09-30 PROCEDURE — 84443 ASSAY THYROID STIM HORMONE: CPT

## 2024-09-30 PROCEDURE — 81003 URINALYSIS AUTO W/O SCOPE: CPT

## 2024-09-30 PROCEDURE — 82607 VITAMIN B-12: CPT

## 2024-09-30 PROCEDURE — 86140 C-REACTIVE PROTEIN: CPT

## 2024-09-30 NOTE — PROGRESS NOTES
CCM monthly outreach     LOV: 9/25/24  NOV: 12/6/24     Health Maintenance Due: up to date    Having pain in R knee  Will see Dr. Do on 10/9     Pelvic floor pain that comes and goes  Has gone to PT twice   Will go several more times on Thursdays  Also has exercises and stretches to do at home  She has found some improvement with this    Having episodes of hallucinations at night  Most recently 2 days ago  Scheduled to see psychologist on Thurs    Considering changing her insurance again  Will discuss with [employer]

## 2024-09-30 NOTE — PROGRESS NOTES
"PELVIC FLOOR PHYSICAL THERAPY TREATMENT NOTE    Patient Name:  Adelia Munson   Patient MRN: [id number]  Date: 10/03/24  Time Calculation  Start Time: 1004  Stop Time: 1044  Time Calculation (min): 40 min    Insurance:  Visit number: 3 (updated 10/3/2024)   Payor: [employer] / Plan: [employer] / Product Type: *No Product type* /   $40 COPAY VISITS ARE MED NEC NO AUTH NEEDED PAYS % OOP 3800.00 1195.00 APPLIED   Referred by: Mac Dietz MD        PT Therapeutic Procedures Time Entry  Manual Therapy Time Entry: 33  Therapeutic Exercise Time Entry: 7                     Assessment:  Progress towards functional goals: Reduced pain level- able to lay on R side for the first time in weeks  Response to interventions: Reviewed use of pelvic floor wand per pt request. Multiple trigger points present in R side pelvic floor muscle layers 1-2 (greatest around 9-11 o'clock). Pt reporting reducing burning session post pelvic wand use indicating reduced hypertonicity. Pt also reported \"the edge has been taken off the pain\" allowing her to sleep on R side again. Modified external pelvic floor stretches per pt request due to knee pain. Adelia Munson left session with all questions answered and no reported increase in symptoms.   Justification for continued skilled care: .Assess effectiveness of home exercise program. Reduce abdominal and pelvic pain.     Plan: vaginal inhibitation/down-training    Therapy diagnoses:   1. High-tone pelvic floor dysfunction        2. Sacroiliac dysfunction               Subjective:  Adelia reports she feels like her condition is improving. Progress towards functional goal: Reduced pain level- \"the edge has been taken off. I even slept on my R side again but had a little pain the next morning\".   Pain Location RLQ abdomen  Pain (0-10): 0   HEP adherence / understanding: compliance with the instructed home exercises.    Precautions:  Fall risk: low- uses device PRN  (+) " "essential tremors, blood thinners  Denies CA, pacemaker, DM, HTN, latex allergy, epilepsy/seizures, or other known cardio/neuro/pulmonary problems       Objective:  TTP in R side pelvic floor muscle layers 1-2 (greatest around 9-11 o'clock)      Treatment Performed: (\"NP\" = Not Performed)   Therapeutic Exercise:  Seated hip ADD stretch, 3x30 sec each side  Seated happy baby stretch for pelvic floor relaxation, 3x30 sec each side    Manual Therapy:  Therapist performed pelvic wand for review per pt request with informed consent. Includes time spent verbally reviewing instructions and washing device pre/post use  Focused on R side pelvic floor muscle layers 1-2 only  Multiple trigger points present around 9-11 o'clock      NP this session  Therapeutic Activities:  Neuromuscular Re-Education:   Gait Training:  Modalities:       Education: Reviewed home exercise program. Provided verbal feedback to improve exercise technique. Provided manual cues for correct performance of exercises  "

## 2024-10-02 PROCEDURE — RXMED WILLOW AMBULATORY MEDICATION CHARGE

## 2024-10-03 ENCOUNTER — PHARMACY VISIT (OUTPATIENT)
Dept: PHARMACY | Facility: CLINIC | Age: 79
End: 2024-10-03
Payer: MEDICARE

## 2024-10-03 ENCOUNTER — TREATMENT (OUTPATIENT)
Dept: PHYSICAL THERAPY | Facility: CLINIC | Age: 79
End: 2024-10-03
Payer: COMMERCIAL

## 2024-10-03 DIAGNOSIS — M53.3 SACROILIAC DYSFUNCTION: ICD-10-CM

## 2024-10-03 DIAGNOSIS — M62.89 HIGH-TONE PELVIC FLOOR DYSFUNCTION: Primary | ICD-10-CM

## 2024-10-03 PROCEDURE — 97140 MANUAL THERAPY 1/> REGIONS: CPT | Mod: GP

## 2024-10-03 PROCEDURE — 97110 THERAPEUTIC EXERCISES: CPT | Mod: GP

## 2024-10-07 ENCOUNTER — APPOINTMENT (OUTPATIENT)
Dept: OBSTETRICS AND GYNECOLOGY | Facility: CLINIC | Age: 79
End: 2024-10-07
Payer: COMMERCIAL

## 2024-10-09 ENCOUNTER — OFFICE VISIT (OUTPATIENT)
Dept: ORTHOPEDIC SURGERY | Facility: CLINIC | Age: 79
End: 2024-10-09
Payer: COMMERCIAL

## 2024-10-09 ENCOUNTER — HOSPITAL ENCOUNTER (OUTPATIENT)
Dept: RADIOLOGY | Facility: CLINIC | Age: 79
Discharge: HOME | End: 2024-10-09
Payer: COMMERCIAL

## 2024-10-09 DIAGNOSIS — M25.561 RIGHT KNEE PAIN, UNSPECIFIED CHRONICITY: ICD-10-CM

## 2024-10-09 DIAGNOSIS — M17.11 PRIMARY OSTEOARTHRITIS OF RIGHT KNEE: Primary | ICD-10-CM

## 2024-10-09 PROCEDURE — 1157F ADVNC CARE PLAN IN RCRD: CPT | Performed by: ORTHOPAEDIC SURGERY

## 2024-10-09 PROCEDURE — 99213 OFFICE O/P EST LOW 20 MIN: CPT | Performed by: ORTHOPAEDIC SURGERY

## 2024-10-09 PROCEDURE — 1036F TOBACCO NON-USER: CPT | Performed by: ORTHOPAEDIC SURGERY

## 2024-10-09 PROCEDURE — 2500000004 HC RX 250 GENERAL PHARMACY W/ HCPCS (ALT 636 FOR OP/ED): Performed by: ORTHOPAEDIC SURGERY

## 2024-10-09 PROCEDURE — 1123F ACP DISCUSS/DSCN MKR DOCD: CPT | Performed by: ORTHOPAEDIC SURGERY

## 2024-10-09 PROCEDURE — 1159F MED LIST DOCD IN RCRD: CPT | Performed by: ORTHOPAEDIC SURGERY

## 2024-10-09 PROCEDURE — 20610 DRAIN/INJ JOINT/BURSA W/O US: CPT | Mod: RT | Performed by: ORTHOPAEDIC SURGERY

## 2024-10-09 PROCEDURE — 73564 X-RAY EXAM KNEE 4 OR MORE: CPT | Mod: RIGHT SIDE | Performed by: ORTHOPAEDIC SURGERY

## 2024-10-09 PROCEDURE — 99213 OFFICE O/P EST LOW 20 MIN: CPT | Mod: 25 | Performed by: ORTHOPAEDIC SURGERY

## 2024-10-09 PROCEDURE — 1160F RVW MEDS BY RX/DR IN RCRD: CPT | Performed by: ORTHOPAEDIC SURGERY

## 2024-10-09 PROCEDURE — 73564 X-RAY EXAM KNEE 4 OR MORE: CPT | Mod: RT

## 2024-10-09 RX ORDER — LIDOCAINE HYDROCHLORIDE 10 MG/ML
4 INJECTION, SOLUTION INFILTRATION; PERINEURAL
Status: COMPLETED | OUTPATIENT
Start: 2024-10-09 | End: 2024-10-09

## 2024-10-09 RX ORDER — BETAMETHASONE SODIUM PHOSPHATE AND BETAMETHASONE ACETATE 3; 3 MG/ML; MG/ML
2 INJECTION, SUSPENSION INTRA-ARTICULAR; INTRALESIONAL; INTRAMUSCULAR; SOFT TISSUE
Status: COMPLETED | OUTPATIENT
Start: 2024-10-09 | End: 2024-10-09

## 2024-10-09 ASSESSMENT — ENCOUNTER SYMPTOMS
HEADACHES: 0
ARTHRALGIAS: 0
HEMATOCHEZIA: 0
WEIGHT LOSS: 0
MYALGIAS: 1
CONSTIPATION: 0
FLATUS: 0
DYSURIA: 0
ABDOMINAL PAIN: 1
HEMATURIA: 0
BELCHING: 1
FREQUENCY: 1
VOMITING: 0
NAUSEA: 0
FEVER: 0
ANOREXIA: 0
DIARRHEA: 0

## 2024-10-09 NOTE — PROGRESS NOTES
History of present illness    79-year-old female I seen previously presents with a new complaint of right-sided knee pain she states that she has been having some stiffness and soreness in her right knee with swelling no injury.  The symptoms seem to come and go she has had a problem with her knee in the remote past and she think she had a cortisone injection 1 time previously.      Past medical , Surgical, Family and social history reviewed.      Physical exam  General: No acute distress and breathing comfortably.  Patient is pleasant and cooperative with the examination.    Extremity  The affected knee was examined and inspected and was tender to touch along the medial aspect.  The patient has catching/locking and occasional mechanical symptoms.  The skin was intact without breakdown or open wounds.  There was a mild Mirella exam seen with mild evidence of instability and weakness in the collateral ligaments with laxity to varus valgus stress and in the anterior posterior plane.  There was a negative Lachman's test, pivot shift test, and posterior drawer sign.  There was no foot drop, numbness or tingling.  Sensation, reflexes, and pulses in the foot and ankle were present.  There was an effusion but range of motion was good and straight leg raise testing was normal.   The patient had the ability to bear weight but with discomfort.  The patient's gait was antalgic secondary to the discomfort. Knee range of motion was 5-115    Diagnostics      XR knee right 4+ views    Result Date: 10/9/2024  Interpreted By:  Oliverio Do, STUDY: XR KNEE RIGHT 4+ VIEWS; 10/9/2024 10:52 am   INDICATION: Signs/Symptoms:pain.   ACCESSION NUMBER(S): [id number]   ORDERING CLINICIAN: OLIVERIO DO   FINDINGS: Right knee weightbearing four views. Moderate knee arthritis with chondrocalcinosis joint space narrowing moderate osteopenia no evidence of fracture dislocation or other bony abnormality     Signed  by: Joey Do 10/9/2024 11:20 AM Dictation workstation:   [id number]       Procedure  See dictated procedure note    Assessment  Right knee arthritis    Treatment plan  1.  The natural history of the condition and its associated treatment alternatives including surgical and nonsurgical options were discussed with the patient at length.  2.  Cortisone injection right knee performed today with her consent without complication.  Patient understands the cortisone may provide temporary relief how much it helps her how long it lasts is unpredictable.  Cortisone can be repeated after 3 months if symptoms return.  3. [   ]  4.  All of the patient's questions were answered.    Orders Placed This Encounter    Inj/Asp: Right knee    XR knee right 4+ views       This note was prepared using voice recognition software.  The details of this note are correct and have been reviewed, and corrected to the best of my ability.  Some grammatical areas may persist related to the Dragon software    Joey Do MD  Senior Attending Physician  [facility]    [phone]    Inj/Asp: Right knee on 10/9/2024 11:21 AM  Indications: pain and diagnostic evaluation  Details: 22 G needle, anteromedial approach  Medications: 4 mL lidocaine 10 mg/mL (1 %); 2 mL betamethasone acet,sod phos 6 mg/mL  Outcome: tolerated well, no immediate complications  Procedure, treatment alternatives, risks and benefits explained, specific risks discussed. Consent was given by the patient. Immediately prior to procedure a time out was called to verify the correct patient, procedure, equipment, support staff and site/side marked as required. Patient was prepped and draped in the usual sterile fashion.

## 2024-10-10 ENCOUNTER — APPOINTMENT (OUTPATIENT)
Dept: PHYSICAL THERAPY | Facility: CLINIC | Age: 79
End: 2024-10-10
Payer: COMMERCIAL

## 2024-10-14 ENCOUNTER — APPOINTMENT (OUTPATIENT)
Dept: OBSTETRICS AND GYNECOLOGY | Facility: CLINIC | Age: 79
End: 2024-10-14
Payer: COMMERCIAL

## 2024-10-14 DIAGNOSIS — N39.41 URGE INCONTINENCE OF URINE: Primary | ICD-10-CM

## 2024-10-14 PROCEDURE — 1123F ACP DISCUSS/DSCN MKR DOCD: CPT | Performed by: OBSTETRICS & GYNECOLOGY

## 2024-10-14 PROCEDURE — 99443 PR PHYS/QHP TELEPHONE EVALUATION 21-30 MIN: CPT | Performed by: OBSTETRICS & GYNECOLOGY

## 2024-10-14 PROCEDURE — 1157F ADVNC CARE PLAN IN RCRD: CPT | Performed by: OBSTETRICS & GYNECOLOGY

## 2024-10-14 RX ORDER — TROSPIUM CHLORIDE 20 MG/1
20 TABLET, FILM COATED ORAL 2 TIMES DAILY
Qty: 60 TABLET | Refills: 11 | Status: SHIPPED | OUTPATIENT
Start: 2024-10-14 | End: 2025-10-14

## 2024-10-14 NOTE — PROGRESS NOTES
GYN PROGRESS NOTE    Virtual or Telephone Consent    A telephone visit (audio only) between the patient (at the originating site) and the provider (at the distant site) was utilized to provide this telehealth service.   Verbal consent was requested and obtained from Adelia Munson on this date, 10/14/24 for a telehealth visit.   21 minute visit    Chief complaint: follow up    Answers submitted by the patient for this visit:  Abdominal Pain Questionnaire (Submitted on 10/9/2024)  Chief Complaint: Abdominal pain  Chronicity: chronic  Onset: more than 1 year ago  Onset quality: gradual  Frequency: daily  Episode duration: 2 Hours  Progression since onset: gradually improving  Pain location: RLQ, RUQ, right flank  Pain - numeric: 9/10  Pain quality: aching, a sensation of fullness  Radiates to: RLQ, RUQ, right flank  anorexia: No  arthralgias: No  belching: Yes  constipation: No  diarrhea: No  dysuria: No  fever: No  flatus: No  frequency: Yes  headaches: No  hematochezia: No  hematuria: No  melena: No  myalgias: Yes  nausea: No  weight loss: No  vomiting: No  Aggravated by: being still, bowel movement, certain positions, eating, movement  Relieved by: bowel movements, certain positions, movement, palpation, standing  Diagnostic workup: CT scan, GI consult, lower endoscopy, ultrasound, upper endoscopy    From note    Dr. Dietz,     A preview of what I want to talk about...     The pelvic floor physical therapy seems to be helping.  I've had 3 sessions in [place] with Taryn (she was the one who could see me the soonest with the others being Nov and Dec); and I have 2 more Thursday sessions scheduled.  I have a pelvic wand and exercises I'm doing at home.  Taryn also mentioned possible needle injections.     I have to admit that I haven't started the Myrbetrig, as I am concerned about the side effects and the cost.  For the 90 days supply, it is $488 (in the donut hole).  At the suggestion of Dr. Tim's nurse, I  ordered a 2-week supply for $44 (before donut hole amount).  Even then, I'm still hesitant.  (Had bad reactions in the past 2 years to Lovenox, Lexapro and Moxifloxacin.)  I'm focusing on the pelvic floor issue now.  Unfortunately, on the past 2 nights, I've gotten up 7 times -- the most ever.  This weekend I changed summer/winter clothes, carrying/moving heavy plastic containers.  I don't know that switching to flannel sheets & a comforter these 2 nights affected it.  I do sleep sitting up most of the night, due to arthritis pain in right knee (got cortisone shot last week) and the pelvic pain on the right side.  Had a difficult, hard bowel movement yesterday morning.     We can discuss all this when we talk on the phone today.  - She has been doing her exercises daily and the wand every other day.      Reviewed case with patient, reviewed plans.    Advised patient that it takes 6-12 sessions of physical therapy to fully know the extent of its usefulness but noted that Botox injection, dry needling, and Soledad therapy are available to her.    Recommend that she continue her daily exercises.    We viewed overactive bladder alternatives to Myrbetriq that her insurance covers.    HISTORY:  Past Medical History:   Diagnosis Date    Abnormal MRI, musculoskeletal     MRI hip 6/22: 1. Atrophy of the left gluteus medius and minimus consistent with chronic tendon tears with associated trochanteric bursitis. 2. Mild osteoarthrosis of the left hip joint and small left joint effusion. 3. Mild feathery edema involving the left rectus femoris and adductor longus with adjacent intermuscular edema suggestive of mild component of muscular injury.    Abnormal MRI, musculoskeletal     pt 2: MRI Tib/Fib 1/2020: Benign appearing area in the proximal tibial metaphysis, without suspicious features, likely postsurgical defect    Abnormal Pap smear of cervix 1990    Abnormal x-ray of extremity     XR Tib/fib 5/7/21: IMPRESSION: Cortical  based lucency with irregularity in the posterior cortex of the proximal tibial diaphysis. This may represent a prior fibrous lesions. Comparison with prior studies is advised. Followup in 6 months advised for stability if no priors available    Abnormal x-ray of extremity     pt 2: XR Knee: lytic lesion proximal third tibia, knee compartments normal (MRI done that shows postsurgical lesion) 1/16/21: Benign appearing areas in the proximal tibial metaphysis, without suspicious features, [place] postsurgical defect give pts hx    Anemia     Endometriosis 1990    Fibroid 1990    H/O abdominal ultrasound 06/29/2024    US abd soft tissue: No mass or hernia noted    H/O abdominal x-ray series 03/29/2024    nonobstructive bowel gas pattern.  No evidence of pneumoperitoneum.  Visualized portions of the lung are unremarkable.  No acute bony abnormality.  S-shaped curvature of the thoracolumbar spine.  Slight widening of the pubic symphysi    H/O bone density study 12/20/2021    Ten Year Major Osteoporotic Fracture Risk: 22.52% Ten Year Hip Fracture Risk: 13.6% TScore: 2.2 6/18: THE LOWEST TSCORE IS 1.7, FRAX 10/2%    H/O bone density study 03/27/2024    Lowest T score -2.3. FRACTURE RISK (based on FRAX):                       10-year absolute fracture risk:                           - major osteoporotic fracture = 32.1 %                           - hip fracture = 21.7 %    H/O cervical spine x-ray 04/2019    Mild decrease in the intervertebral disc space at the C56 and C6C7. Moderate facet arthropathy with uncovertebral hypertrophy at C4C5 through C6C7    H/O chest x-ray 08/2014    normal 10/22/21: Mild discogenic degenerative changes are seen throughout the thoracic spine. 7/22: normal    H/O CT scan of abdomen     2/13: liver and kidney cysts 7/22: Stomach is decompressed, slightly limiting its evaluation with equivocal mucosal hyperemia and submucosal edema, somewhat suspicious for acute gastritis. There is mild prominence  of mucosal folds and subjective mucosal hyperemia in small bowel, with mesenteric edema, suspicious for infectious/inflammatory enteritis.    H/O CT scan of abdomen     pt 2: Colon is mostly decompressed, with small volume of stool in the proximal colon. Mucosal hyperemia in the colon, most pronounced involving the descending and rectosigmoid colon, with mesocolonic and mesorectal fat stranding, suspicious for acute proctocolitis. No bowel dilation. Mild pelvic ascites, probably reactive in etiology. No pneumoperitoneum or loculated intraperitoneal collection.    H/O CT scan of abdomen     pt 3: Small hepatic and renal cortical cysts. Thoracolumbar scoliosis and lumbar spine degenerative changes. Repeat CT 7/21/22: Imp approved hyperemia of small bowel and distal colon. Mild generalized mesenteric edema persists with small volume fluid which    H/O CT scan of abdomen 05/31/2024    Left lower pole renal cyst with nodular high density component, favoring  hemorrhagic cyst, though should be further assessed via renal CT/MRI. 0.6cm lung nodule right base, stable from 2013, benign. Diverticulosis    H/O CT scan of brain 11/2021    There are scattered dural ossifications most prominently along the left frontal lobe where there is a 34 mm thick 15 mm diameter ossification. No associated soft tissue mass to suggest meningioma is noted.    H/O CT scan of chest 08/2020    CT: NO PE    H/O echocardiogram 04/2005    Trivial MR with no mitral valve prolapse. Normal LV fx, neg for ischemia 11/20: 1.left ventricular systolic function normal,60-65% est ejection fraction. 2. Poorly visualized anatomical struct due to subopt image. 3. Spectral Doppler shows an impaired relaxation pattern of left ventricular diastolic filling. 4. RVSP within normal limits. 5. No significant valvular pathology seen. 6. No prior echo    H/O magnetic resonance imaging 08/19/2024    MRI kidney: Left lower pole renal lesion is compatible with a Bosniak 2   proteinaceous/hemorrhagic cyst.  No suspicious renal mass lesions are  noted in either kidney.    H/O magnetic resonance imaging of brain and brain stem 11/24/2023    Mild white-matter changes are nonspecific but most likely represent small-vessel ischemic disease in a patient of this age and also involve the gonzalo. No evidence of acute ischemic injury or intracranial mass effect.    H/O spine x-ray 06/22/2023    XR Sacrum: Acute angulation at the sacrococcygeal junction which may reflect a prior injury/fracture.    H/O x-ray of lumbar spine 06/22/2023    There is mild anterolisthesis of L3 on L4 and L4 on L5. There is mild multilevel endplate sclerosis and osteophytosis. There is moderate facet disease lower lumbar spine as well    History of colitis     CT 7/22 Stomach is decompressed, slightly limiting its evaluation with equivocal mucosal hyperemia and submucosal edema, somewhat suspicious for acute gastritis. There is mild prominence of mucosal folds and subjective mucosal hyperemia in small bowel, with mesenteric edema, suspicious for infectious/inflammatory enteritis.    History of colitis     pt 2: Colon is mostly decompressed, with small volume of stool in the proximal colon. Mucosal hyperemia in the colon, most pronounced involving the descending and rectosigmoid colon, with m    History of PFTs 11/2022    DLCO substantially reduced, air trapping also    Holter monitor, abnormal 10/2022    Sinus karen/tachycardia. 65417, average 74. 741 PVCs (0.05% burden), 8565 PSVCs (0.58%), 23 occurrences of PSVT, longest 34 beats, fastest 162 1 reported event 8/22: HR 46768, average 94. Rare PACs, PVCs, no AF (40hours) 7 events recorded, all autotriggered    HPV (human papilloma virus) infection 1980    Urinary tract infection     Varicella      Past Surgical History:   Procedure Laterality Date    APPENDECTOMY      BREAST BIOPSY      Biopsy Breast Percutaneous Needle Core, 10/18: Right breast mass, needle localized  excisional biopsy (A)  Proliferative epithelial changes including radial sclerosing lesions, sclerosing adenosis and usual ductal hyperplasia (please see comment)  Microcalcifications present in proliferative epithelial changes and unremarkable lobules    BREAST BIOPSY  10/2018    sclerosing lesions, sclerosing adenosis and usual ductal hyperplasia 1. Right breast mass, needle localized excisional biopsy (A)  Proliferative epithelial changes including radial sclerosing lesions, sclerosing adenosis and usual ductal hyperplasia. Microcalcifications present in proliferative epithelial changes and unremarkable lobules/Biopsy clip and biopsy site reparative changes identified    BREAST BIOPSY      pt 2:2. Right breast, new medial margin, excision (B)  Fibrofatty breast tissue 3. Left breast, needle localized excisional biopsy (C)  Proliferative epithelial changes including radial sclerosing lesions, sclerosing adenosis and usual ductal hyperplasia (please see comment)  Microcalcifications present in proliferative epithelial changes and unremarkable lobules    BREAST BIOPSY      pt 3: Biopsy clip and biopsy site reparative changes identified 4. Left breast, new medial margin, excision (D)  Proliferative epithelial changes    BREAST LUMPECTOMY      CARDIAC CATHETERIZATION  09/14/2020 9/13: normal POSTOPERATIVE DIAGNOSES: 1. Normal LEFT MAIN. 2. Normal LAD. 3. Normal CIRCUMFLEX. 4. Normal RIGHT CORONARY ARTERY. 5. Normal left ventricular function, ejection fraction of 70%.    COLONOSCOPY  10/05/2021    1/11: Hemorrhoids; normal (8); diverticulosis, biopsy: (-) 9/2019: Diverticulosis, polyp:  Ascending colon, polypectomy (C) - Fragments of tubular adenoma.    COLONOSCOPY  03/28/2024    divertiuculosis    CONDYLOMA EXCISION/FULGURATION  1980    CYSTOSCOPY  10/05/2021    6/21: Cystoscopy Findings: atrophic vaginitis.  Cystoscopy today reveals a urethral stricture dilated to 20 Marshallese with minimal pain. Once inside the  bladder there is severe erythema in the trigone. Mild trabeculations. No evidence of stones or tumors. Flow rate 9 cc/s, total volume 172 cc, PVR 61 cc.    ENDOMETRIAL ABLATION      ESOPHAGOGASTRODUODENOSCOPY  10/05/2021    6/21: Cystoscopy Findings: atrophic vaginitis.  Cystoscopy today reveals a urethral stricture dilated to 20 Russian with minimal pain. Once inside the bladder there is severe erythema in the trigone. Mild trabeculations. No evidence of stones or tumors. Flow rate 9 cc/s, total volume 172 cc, PVR 61 cc.    ESOPHAGOGASTRODUODENOSCOPY  03/28/2024    gastritis, healthy gastric bypass    FLEXOR TENDON OF FOREARM / WRIST REPAIR      OTHER SURGICAL HISTORY      Adjacent Tissue Transfer    POLYSOMNOGRAPHY  06/14/2023    PSG: Mild SHIMA, O2  90%, AHI 8.4. 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 (no drug eruption, lupus, etc)    TOTAL ABDOMINAL HYSTERECTOMY      Hysterectomy, TAHBSO (endometriosis and fibroids)    TUMOR EXCISION      Excision Of Leg Soft Tissue Tumor     Social History     Socioeconomic History    Marital status: Single     Spouse name: Not on file    Number of children: Not on file    Years of education: Not on file    Highest education level: Not on file   Occupational History    Not on file   Tobacco Use    Smoking status: Never     Passive exposure: Past    Smokeless tobacco: Never   Vaping Use    Vaping status: Never Used   Substance and Sexual Activity    Alcohol use: Yes     Alcohol/week: 2.0 standard drinks of alcohol     Types: 2 Glasses of wine per week     Comment: Drink occasionally wine/beer with meals/friends.    Drug use: Never    Sexual activity: Not Currently     Partners: Male     Birth control/protection: Post-menopausal, Surgical     Comment: Complete hysterectomy in 1990 when age 45.   Other Topics Concern    Not on file   Social History Narrative    Never     No kids    Retired    Nonsmoker     Social ETOH    ---    Family History:    M:  Alzheimer's Disease, Fell, broke hip/OP, tremor ( age 90)    F:  'old age' at 92    B:  None                                                          B:  Lipids, HTN                     B: Hearing Loss, HTN    S:  Anemia, dementia onset around 77 yo     S: oral CA, stroke ( age 55)    MUncle:  Colon Cancer    PAunts:Breast Cancer       x2 and cousin     Social Determinants of Health     Financial Resource Strain: Not on file   Food Insecurity: No Food Insecurity (2024)    Hunger Vital Sign     Worried About Running Out of Food in the Last Year: Never true     Ran Out of Food in the Last Year: Never true   Transportation Needs: Not on file   Physical Activity: Not on file   Stress: Not on file   Social Connections: Not on file   Intimate Partner Violence: Not on file   Housing Stability: Not on file     Cancer-related family history includes Breast cancer in her father's sister, father's sister, and paternal cousin; Cancer in her mother's brother; Colon cancer in her mother's brother.       IMPRESSION/PLAN:  79 y.o. urinary urge incontinence.    - Trospium 20 mg 2 times daily  - Urine culture    Follow up in 1 month.    Greg FORD, raymon scribing for virtually, and in the presence of Dr. Mac Dietz on  10/14/24 at 8:52 PM     Mac Dietz MD

## 2024-10-15 ENCOUNTER — LAB (OUTPATIENT)
Dept: LAB | Facility: LAB | Age: 79
End: 2024-10-15
Payer: COMMERCIAL

## 2024-10-15 DIAGNOSIS — N39.41 URGE INCONTINENCE OF URINE: ICD-10-CM

## 2024-10-15 PROCEDURE — 87086 URINE CULTURE/COLONY COUNT: CPT

## 2024-10-16 NOTE — PROGRESS NOTES
PELVIC FLOOR PHYSICAL THERAPY TREATMENT NOTE    Patient Name:  Adelia Munson   Patient MRN: [id number]  Date: 10/17/24  Time Calculation  Start Time: 1004  Stop Time: 1045  Time Calculation (min): 41 min    Insurance:  Visit number: 4 (updated 10/17/2024)   Payor: [employer] / Plan: [employer] / Product Type: *No Product type* /   $40 COPAY VISITS ARE MED NEC NO AUTH NEEDED PAYS % OOP 3800.00 1195.00 APPLIED   Referred by: Mac Dietz MD        PT Therapeutic Procedures Time Entry  Manual Therapy Time Entry: 38  Therapeutic Activity Time Entry: 3                     Assessment:  Progress towards functional goals: Reduced pain level  Response to interventions: Held on internal and dry needling interventions due to possible UTI. Addressed mod tightness in RLQ over TA/obliques vs WNL L side. Improved soft tissue mobility (mild tightness) with significant reduction in pain to 1/10 at end of session. Practiced diaphragmatic breathing in supine due to excessive chest movement for down-training. Adelia Munson left session with all questions answered.  Justification for continued skilled care: .Assess effectiveness of home exercise program. Reduce abdominal and pelvic pain.     Plan: vaginal inhibitation/down-training    Therapy diagnoses:   1. High-tone pelvic floor dysfunction        2. Sacroiliac dysfunction               Subjective:  Adelia reports she feels like her condition is improving. Progress towards functional goal: Reduced pain level since starting PT. However, mild increase in pain since last session which she attributes to repetitive bending while lifting boxes of clothes. Recently performed urine culture for suspected UTI- has not received results.  Pain Location RLQ abdomen  Pain (0-10): 5   HEP adherence / understanding: compliance with the instructed home exercises.    Precautions:  Fall risk: low- uses device PRN  (+) essential tremors, blood thinners  Denies CA,  "pacemaker, DM, HTN, latex allergy, epilepsy/seizures, or other known cardio/neuro/pulmonary problems       Objective:  Mod tightness/tender nodules in R TA/obliques near ASIS      Treatment Performed: (\"NP\" = Not Performed)     Manual Therapy:  - STM vs light myofascial cupping over abdominal RLQ over TA/obliques. Performed with pt in supine     Therapeutic Activities:  - Hooklying EC diaphragmatic breathing for pelvic floor relaxation: inhale \"senia bud petals softly opening\" and exhale \"petals drawing back together\" x3 min      NP this session  Therapeutic Activities:  Therapeutic Exercise:  Neuromuscular Re-Education:   Gait Training:  Modalities:       Education: Reviewed home exercise program  "

## 2024-10-17 ENCOUNTER — TREATMENT (OUTPATIENT)
Dept: PHYSICAL THERAPY | Facility: CLINIC | Age: 79
End: 2024-10-17
Payer: COMMERCIAL

## 2024-10-17 DIAGNOSIS — M62.89 HIGH-TONE PELVIC FLOOR DYSFUNCTION: Primary | ICD-10-CM

## 2024-10-17 DIAGNOSIS — M53.3 SACROILIAC DYSFUNCTION: ICD-10-CM

## 2024-10-17 LAB — BACTERIA UR CULT: NORMAL

## 2024-10-17 PROCEDURE — 97140 MANUAL THERAPY 1/> REGIONS: CPT | Mod: GP

## 2024-10-23 NOTE — PROGRESS NOTES
PELVIC FLOOR PHYSICAL THERAPY TREATMENT NOTE    Patient Name:  Adelia Munson   Patient MRN: [id number]  Date: 10/24/24  Time Calculation  Start Time: 0955  Stop Time: 1043  Time Calculation (min): 48 min    Insurance:  Visit number: 5 (updated 10/24/2024)   Payor: [employer] / Plan: [employer] / Product Type: *No Product type* /   $40 COPAY VISITS ARE MED NEC NO AUTH NEEDED PAYS % OOP 3800.00 1195.00 APPLIED   Referred by: Mca Dietz MD        PT Therapeutic Procedures Time Entry  Therapeutic Exercise Time Entry: 2  Therapeutic Activity Time Entry: 46                     Assessment:  Re-assessment performed today after Adelia Munson has attended 5V for  abdominal (RLQ) and pelvic pain. Treatments have primarily consisted of: TherEx, TherAct, and Manual Therapy. Adelia Munson has demonstrated improvements in reduced pain since initial evaluation. She has improved tolerance to sleeping on R side, bending to unload , and wearing tight clothing. She continues to have tenderness to palpation over R transverse abdominis/obliques and hypertonicity/pain in pelvic floor muscles limiting full participation in sleep, household chores, and recreational activities. Good progress made towards established goals. Skilled physical therapy services continue to be appropriate and beneficial at recommended frequency of 1x week for 5 weeks to advance functional status and attain therapy-related goals. Verbally added modified bird dogs to HEP for abdominal stretching. Initiated abdominal densensitization in attempt to reduce pain with wearing tight clothing- will monitor response. Adelia Munson left session with all questions answered and no reported increase in symptoms.       Plan: vaginal inhibitation/down-training    Therapy diagnoses:   1. High-tone pelvic floor dysfunction  Follow Up In Physical Therapy      2. Sacroiliac dysfunction  Follow Up In Physical Therapy          "    Subjective:  Adelia reports she feels like her condition is improving. Progress towards functional goal: Reduced intensity and frequency of pain. Continues to experience increased symptoms with bending to unloading , wearing tight clothing, and sleeping on R side- pain is no longer sharp.   Pain (0-10): 0   HEP adherence / understanding: compliance with the instructed home exercises.    Precautions:  Fall risk: low- uses device PRN  (+) essential tremors, blood thinners  Denies CA, pacemaker, DM, HTN, latex allergy, epilepsy/seizures, or other known cardio/neuro/pulmonary problems       Objective:  Female NIH-CPSI: 21  Reduced TTP over RLQ- mod tension in TA/obliques vs L side    GOALS:   STG (to be achieved in 3 weeks)  Adelia Munson will independently perform HEP as assigned to promote self-management of symptoms and continue making functional gains outside of physical therapy.  10/24/24: GOAL MET     LTG (to be achieved by discharge)  Adelia Munson will decrease NIH-CPSI raw score by at least 6 points (MCID) to demonstrate improved quality of life. Baseline: 25  10/24/24: PROGRESSING- 21 points    Adelia Munson will report 0/10 pain with wearing fitted clothing.  10/24/24: PROGRESSING- reports 3-4/10 pain at end of day with wearing tight clothing    Adelia Munson will be able to unload  with no >2/10 abdominal pain.   10/24/24: PROGRESSING- reports 4/10 pain with unloading     Adelia Munson will be able to lay on R side with at least 75% reduction in symptoms to assist with sleeping.   10/24/24: PROGRESSING- reports 40% improvement in pain while sleeping on R side      Treatment Performed: (\"NP\" = Not Performed)     Therapeutic Exercise:  - Modified bird dogs with counter support, 2x10 each side    Therapeutic Activities:  - Re-assessment: see subjective, objective, assessment, and updated goals   - Abdominal desensitization: tissue x2 min and tapping using cotton swab " x2 min over abdominal RLQ  - Education on how to progress abdominal desensitization with handout provided (https://www.[employer]/blog/tip-of-the-week-desensitizing-your-scar-is-just-as-important-as-mobilizing-it#:~:text=silk%20%2D%2D%3E%20cotton%20%2D%2D%3E%20linen,mildly%20uncomfortable%2D%2D%20not%20painful!)        NP this session  Manual Therapy:  Therapeutic Exercise:  Neuromuscular Re-Education:   Gait Training:  Modalities:       Education: Reviewed home exercise program

## 2024-10-24 ENCOUNTER — TREATMENT (OUTPATIENT)
Dept: PHYSICAL THERAPY | Facility: CLINIC | Age: 79
End: 2024-10-24
Payer: COMMERCIAL

## 2024-10-24 ENCOUNTER — APPOINTMENT (OUTPATIENT)
Dept: PHYSICAL THERAPY | Facility: CLINIC | Age: 79
End: 2024-10-24
Payer: COMMERCIAL

## 2024-10-24 DIAGNOSIS — M53.3 SACROILIAC DYSFUNCTION: ICD-10-CM

## 2024-10-24 DIAGNOSIS — M62.89 HIGH-TONE PELVIC FLOOR DYSFUNCTION: Primary | ICD-10-CM

## 2024-10-24 PROCEDURE — 97530 THERAPEUTIC ACTIVITIES: CPT | Mod: GP

## 2024-10-29 ENCOUNTER — PATIENT OUTREACH (OUTPATIENT)
Dept: PRIMARY CARE | Facility: CLINIC | Age: 79
End: 2024-10-29
Payer: COMMERCIAL

## 2024-10-29 DIAGNOSIS — I47.10 PAROXYSMAL SUPRAVENTRICULAR TACHYCARDIA (CMS-HCC): ICD-10-CM

## 2024-10-29 DIAGNOSIS — R10.31 RLQ ABDOMINAL PAIN: ICD-10-CM

## 2024-10-29 DIAGNOSIS — N18.2 CKD (CHRONIC KIDNEY DISEASE) STAGE 2, GFR 60-89 ML/MIN: ICD-10-CM

## 2024-10-29 PROCEDURE — 99490 CHRNC CARE MGMT STAFF 1ST 20: CPT | Performed by: INTERNAL MEDICINE

## 2024-10-30 ENCOUNTER — TREATMENT (OUTPATIENT)
Dept: PHYSICAL THERAPY | Facility: CLINIC | Age: 79
End: 2024-10-30
Payer: COMMERCIAL

## 2024-10-30 DIAGNOSIS — M53.3 SACROILIAC DYSFUNCTION: ICD-10-CM

## 2024-10-30 DIAGNOSIS — M62.89 HIGH-TONE PELVIC FLOOR DYSFUNCTION: Primary | ICD-10-CM

## 2024-10-30 PROCEDURE — 97112 NEUROMUSCULAR REEDUCATION: CPT | Mod: GP

## 2024-10-30 PROCEDURE — 97530 THERAPEUTIC ACTIVITIES: CPT | Mod: GP

## 2024-11-01 PROCEDURE — RXMED WILLOW AMBULATORY MEDICATION CHARGE

## 2024-11-05 ENCOUNTER — PHARMACY VISIT (OUTPATIENT)
Dept: PHARMACY | Facility: CLINIC | Age: 79
End: 2024-11-05
Payer: MEDICARE

## 2024-11-07 NOTE — PROGRESS NOTES
PELVIC FLOOR PHYSICAL THERAPY TREATMENT NOTE    Patient Name:  Adelia Munson   Patient MRN: [id number]  Date: 11/13/24  Time Calculation  Start Time: 1508  Stop Time: 1548  Time Calculation (min): 40 min    Insurance:  Visit number: 7 (updated 11/13/2024)   Payor: FloQast / Plan: FloQast / Product Type: *No Product type* /   $40 COPAY VISITS ARE MED NEC NO AUTH NEEDED PAYS % OOP 3800.00 1195.00 APPLIED   Referred by: Mac Dietz MD        PT Therapeutic Procedures Time Entry  Therapeutic Exercise Time Entry: 8  Therapeutic Activity Time Entry: 32                     Assessment:  Progress towards functional goals: Reduced pain level  Response to interventions: Performed TENS externally in pattern for pelvic/abdominal pain as described below due to reported benefits following internal ESTIM for pelvic floor down-training. Education on pain neuroscience education and rationale for TENS provided. Continued with diaphragmatic breathing with emphasis on pelvic floor and abdomen for global down-training of nervous system. Mild discomfort in low back and upper abdomen after laying in supine/hooklying which resolved with sitting up. No adverse response reported exiting. Adelia Munson left session with all questions answered and no reported increase in symptoms.   Justification for continued skilled care: Assess effectiveness of home exercise program. Reduce abdominal and pelvic pain.     Plan: vaginal inhibitation/down-training    Therapy diagnoses:   1. High-tone pelvic floor dysfunction  Follow Up In Physical Therapy      2. Sacroiliac dysfunction  Follow Up In Physical Therapy             Subjective:  Adelia reports she feels like her condition is improving. Progress towards functional goal: Reduced intensity and frequency of pain. Felt better for 2 days after biofeedback last session. Has started yoga classes at [employer] [facility].   Pain (0-10): 0 currently  HEP adherence /  "understanding: compliance with the instructed home exercises.    Precautions:  Fall risk: low- uses device PRN  (+) essential tremors, blood thinners  Denies CA, pacemaker, DM, HTN, latex allergy, epilepsy/seizures, or other known cardio/neuro/pulmonary problems       Objective:  Battery operated TENS   Custom program with intensity 6.5 in channel 1 and channel 2      Treatment Performed: (\"NP\" = Not Performed)     Therapeutic Exercise:  Performed in hooklying with lights dimmed and TENS donned to promote down-training of nervous system  Diaphragmatic breathing for pelvic floor relaxation: inhale \"senia bud petals softly opening\" and exhale \"petals drawing back together\" x4 min  Diaphragmatic breathing lower abdominal relaxation: feel abdomen push into weights with inhale x4 min    Therapeutic Activities:  Discussion of symptoms  Relief for multiple days following pelvic floor down-training using ESTIM  Education on TENS pad placement for abdominal/pelvic pain if it provides benefits post-session (handout provided)  Channel 1: inferior and lateral to naval R side and medial to L ASIS  Channel 2: inferior and lateral to naval L side and medial to R ASIS    All of the following performed with TENS unit donned. Includes set-up time  Pain neuroscience education  Review of Lolita Obando materials  Education on rationale/benefits of TENS   Watched video: [email or web address]      NP this session  Neuromuscular Re-Education:   Manual Therapy:  Therapeutic Exercise:  Gait Training:  Modalities:       Education: Reviewed home exercise program. Education pain neuroscience education and benefits of TENS  "

## 2024-11-08 ENCOUNTER — APPOINTMENT (OUTPATIENT)
Dept: PHYSICAL THERAPY | Facility: CLINIC | Age: 79
End: 2024-11-08
Payer: COMMERCIAL

## 2024-11-13 ENCOUNTER — TREATMENT (OUTPATIENT)
Dept: PHYSICAL THERAPY | Facility: CLINIC | Age: 79
End: 2024-11-13
Payer: COMMERCIAL

## 2024-11-13 DIAGNOSIS — M53.3 SACROILIAC DYSFUNCTION: ICD-10-CM

## 2024-11-13 DIAGNOSIS — M62.89 HIGH-TONE PELVIC FLOOR DYSFUNCTION: Primary | ICD-10-CM

## 2024-11-13 PROCEDURE — 97530 THERAPEUTIC ACTIVITIES: CPT | Mod: GP

## 2024-11-13 PROCEDURE — 97110 THERAPEUTIC EXERCISES: CPT | Mod: GP

## 2024-11-15 ENCOUNTER — APPOINTMENT (OUTPATIENT)
Dept: PHYSICAL THERAPY | Facility: CLINIC | Age: 79
End: 2024-11-15
Payer: COMMERCIAL

## 2024-11-17 ENCOUNTER — PATIENT MESSAGE (OUTPATIENT)
Dept: PRIMARY CARE | Facility: CLINIC | Age: 79
End: 2024-11-17
Payer: COMMERCIAL

## 2024-11-17 DIAGNOSIS — M47.812 SPONDYLOSIS OF CERVICAL REGION WITHOUT MYELOPATHY OR RADICULOPATHY: ICD-10-CM

## 2024-11-17 DIAGNOSIS — Z77.011 LEAD EXPOSURE: Primary | ICD-10-CM

## 2024-11-18 NOTE — PROGRESS NOTES
PELVIC FLOOR PHYSICAL THERAPY TREATMENT NOTE    Patient Name:  Adelia Munson   Patient MRN: [id number]  Date: 11/20/24  Time Calculation  Start Time: 1520  Stop Time: 1601  Time Calculation (min): 41 min    Insurance:  Visit number: 8 (updated 11/20/2024)   Payor: ModoPayments / Plan: ModoPayments / Product Type: *No Product type* /   $40 COPAY VISITS ARE MED NEC NO AUTH NEEDED PAYS % OOP 3800.00 1195.00 APPLIED   Referred by: Mac Dietz MD        PT Therapeutic Procedures Time Entry  Therapeutic Exercise Time Entry: 41                     Assessment:  Progress towards functional goals: Reduced pain level. Improved quality of sleep.   Response to interventions: External abdominal vs internal vaginal sensor e-stim provided equal benefits- continued with external TENS per pt request. Paired TENS with gentle contract/relax abdominal and hip movements. Mild increase in R abdominal discomfort with hip ext- resolved after exercise, similar to previous attempt of modified bird dog. Good relief reported with hip flex/mild abdominal stretch. Adelia Munson left session with all questions answered and abolished pain.   Justification for continued skilled care: Assess effectiveness of home exercise program. Reduce abdominal and pelvic pain.       Plan: vaginal inhibitation/down-training    Therapy diagnoses:   1. High-tone pelvic floor dysfunction  Follow Up In Physical Therapy      2. Sacroiliac dysfunction  Follow Up In Physical Therapy           Subjective:  Adelia reports she feels like her condition is improving. Progress towards functional goal: Reduced intensity and frequency of pain. Felt better for multiple days following external TENS- contacted physician who ordered her a unit. Has been sleeping better and participating in chair yoga. Highest pain has been over the past week was 7/5/10 after sitting at movie theater.   Pain (0-10): 3 currently  HEP adherence / understanding: compliance  "with the instructed home exercises.    Precautions:  Fall risk: low- uses device PRN  (+) essential tremors, blood thinners  Denies CA, pacemaker, DM, HTN, latex allergy, epilepsy/seizures, or other known cardio/neuro/pulmonary problems       Objective:  TENS set-up  Channel 1: inferior and lateral to naval R side and medial to L ASIS, intensity 8.0  Channel 2: inferior and lateral to naval L side and medial to R ASIS, intensity 8.0      Treatment Performed: (\"NP\" = Not Performed)   Therapeutic Exercise:  The following performed with TENS on   Bent knee fall outs with TA contraction, 3x10  Hooklying hip ADD ball squeeze, 3x10  Hooklying hip ABD using yellow theraband, 3x10    Performed without TENS  Standing resisted hip ext using yellow theraband, 3x10 each side  Standing hip flexor stretch, 3x30 sec each side      NP this session  Therapeutic Activities:  Neuromuscular Re-Education:   Manual Therapy:  Gait Training:  Modalities:       Education: Cueing and rationale for ther-ex  "

## 2024-11-19 NOTE — PATIENT COMMUNICATION
Spoke with patient.  Patient stated she only wants it if PCP thinks she should get it.  Placed paperwork on PCP desk.

## 2024-11-20 ENCOUNTER — TREATMENT (OUTPATIENT)
Dept: PHYSICAL THERAPY | Facility: CLINIC | Age: 79
End: 2024-11-20
Payer: COMMERCIAL

## 2024-11-20 DIAGNOSIS — M62.89 HIGH-TONE PELVIC FLOOR DYSFUNCTION: Primary | ICD-10-CM

## 2024-11-20 DIAGNOSIS — M53.3 SACROILIAC DYSFUNCTION: ICD-10-CM

## 2024-11-20 PROCEDURE — 97110 THERAPEUTIC EXERCISES: CPT | Mod: GP

## 2024-11-21 ENCOUNTER — APPOINTMENT (OUTPATIENT)
Dept: NEUROLOGY | Facility: CLINIC | Age: 79
End: 2024-11-21
Payer: COMMERCIAL

## 2024-11-21 ENCOUNTER — TELEPHONE (OUTPATIENT)
Dept: PRIMARY CARE | Facility: CLINIC | Age: 79
End: 2024-11-21

## 2024-11-21 VITALS
HEART RATE: 75 BPM | BODY MASS INDEX: 24.4 KG/M2 | TEMPERATURE: 96.8 F | SYSTOLIC BLOOD PRESSURE: 122 MMHG | WEIGHT: 155.5 LBS | HEIGHT: 67 IN | DIASTOLIC BLOOD PRESSURE: 80 MMHG

## 2024-11-21 DIAGNOSIS — G25.0 ESSENTIAL TREMOR: Primary | ICD-10-CM

## 2024-11-21 DIAGNOSIS — R92.8 ABNORMAL MAMMOGRAM: ICD-10-CM

## 2024-11-21 DIAGNOSIS — R44.2 HYPNOPOMPIC HALLUCINATION: ICD-10-CM

## 2024-11-21 PROCEDURE — 99214 OFFICE O/P EST MOD 30 MIN: CPT | Performed by: PSYCHIATRY & NEUROLOGY

## 2024-11-21 PROCEDURE — 1157F ADVNC CARE PLAN IN RCRD: CPT | Performed by: PSYCHIATRY & NEUROLOGY

## 2024-11-21 PROCEDURE — G2211 COMPLEX E/M VISIT ADD ON: HCPCS | Performed by: PSYCHIATRY & NEUROLOGY

## 2024-11-21 PROCEDURE — 1159F MED LIST DOCD IN RCRD: CPT | Performed by: PSYCHIATRY & NEUROLOGY

## 2024-11-21 PROCEDURE — 1123F ACP DISCUSS/DSCN MKR DOCD: CPT | Performed by: PSYCHIATRY & NEUROLOGY

## 2024-11-21 PROCEDURE — 1036F TOBACCO NON-USER: CPT | Performed by: PSYCHIATRY & NEUROLOGY

## 2024-11-21 ASSESSMENT — LIFESTYLE VARIABLES
HOW OFTEN DO YOU HAVE A DRINK CONTAINING ALCOHOL: MONTHLY OR LESS
HOW MANY STANDARD DRINKS CONTAINING ALCOHOL DO YOU HAVE ON A TYPICAL DAY: 1 OR 2
AUDIT-C TOTAL SCORE: 1
HOW OFTEN DO YOU HAVE SIX OR MORE DRINKS ON ONE OCCASION: NEVER
SKIP TO QUESTIONS 9-10: 1

## 2024-11-21 ASSESSMENT — PATIENT HEALTH QUESTIONNAIRE - PHQ9
SUM OF ALL RESPONSES TO PHQ9 QUESTIONS 1 & 2: 1
1. LITTLE INTEREST OR PLEASURE IN DOING THINGS: NOT AT ALL
2. FEELING DOWN, DEPRESSED OR HOPELESS: SEVERAL DAYS

## 2024-11-21 NOTE — TELEPHONE ENCOUNTER
Patient called and is scheduled tomorrow at [facility] for a F/U Mammogram. The last test she had 5/15 was indeterminate and they advised her to have another US/Mammogram in six months of the left breast.  Information is in the care everywhere [facility] records. They are now requesting a requisition from PCP for the test scheduled tomorrow. Please send to patient through MY CHART so she can print the order to take with her tomorrow.

## 2024-11-21 NOTE — PROGRESS NOTES
PELVIC FLOOR PHYSICAL THERAPY TREATMENT NOTE    Patient Name:  Adelia Munson   Patient MRN: [id number]  Date: 11/27/24  Time Calculation  Start Time: 1150  Stop Time: 1230  Time Calculation (min): 40 min    Insurance:  Visit number: 9 (updated 11/27/2024)   Payor: [employer] / Plan: [employer]ix / Product Type: *No Product type* /   $40 COPAY VISITS ARE MED NEC NO AUTH NEEDED PAYS % OOP 3800.00 1195.00 APPLIED   Referred by: Mac Dietz MD        PT Therapeutic Procedures Time Entry  Therapeutic Exercise Time Entry: 40                     Assessment:  Progress towards functional goals: Reduced pain level. Improved quality of sleep. Improved ability to complete household chores  Response to interventions: Continued with TENS- good carryover of pain reduction between sessions. Pt has returned to sleeping on R side and unloading  with minimal to no pain. Paired TENS with hip/pelvic floor stretching and gentle TA activation for contract/relax. Adelia Munson left session with all questions answered and abolished pain.   Justification for continued skilled care: Assess effectiveness of home exercise program. Reduce abdominal and pelvic pain.       Plan: next visit is a re-assessment     Therapy diagnoses:   1. High-tone pelvic floor dysfunction  Follow Up In Physical Therapy      2. Sacroiliac dysfunction  Follow Up In Physical Therapy           Subjective:  Adelia reports she feels like her condition is improving. Has been able to sleep on both sides vs sleeping upright-pleased. Also notes less severe pain with bending to unload .  Has not yet received TENS unit. Read about DN- not interested at this time.   Pain (0-10): 2 pressure in abdomen-RLQ  HEP adherence / understanding: compliance with the instructed home exercises.    Precautions:  Fall risk: low- uses device PRN  (+) essential tremors, blood thinners  Denies CA, pacemaker, DM, HTN, latex allergy,  "epilepsy/seizures, or other known cardio/neuro/pulmonary problems       Objective:  TENS set-up  Channel 1: inferior and lateral to naval R side and medial to L ASIS, intensity 6.5-7.0  Channel 2: inferior and lateral to naval L side and medial to R ASIS, intensity 7.0      Treatment Performed: (\"NP\" = Not Performed)   Therapeutic Exercise:  The following performed with TENS on   Butterfly stretch, 3x30 sec  Seated happy baby with trunk flexion, 3x30 sec  Hooklying hip ER stretch, 3x30 sec each side  Bent knee fall outs, 3x10    Discussion of current s/s and benefits of obtaining TENS for home use    NP this session  Therapeutic Activities:  Neuromuscular Re-Education:   Manual Therapy:  Gait Training:  Modalities:       Education: Cueing and rationale for ther-ex  "

## 2024-11-21 NOTE — PROGRESS NOTES
"Chief Complaint: Tremors    HPI  79 y.o. female presenting for follow up regarding tremors.    Since the last visit, she has noted an increase in her tremors.  She has a side to side head tremor.  She also has tremors in her left hand.  She notes mid tremors in her right hand.     She has noted hallucinations that are infrequent.  They seem to occur mainly when she transitions from sleep to wakefulness.  They are short-lived (lasting no more than 1 minute).          Current Outpatient Medications:     apixaban (Eliquis) 5 mg tablet, Take 1 tablet (5 mg) by mouth 2 times a day., Disp: 60 tablet, Rfl: 3    calcium carbonate-vitamin D3 600 mg-20 mcg (800 unit) tablet, Take 2 tablets by mouth once daily., Disp: , Rfl:     esomeprazole (NexIUM) 20 mg DR capsule, Take 1 capsule (20 mg) by mouth once daily., Disp: 90 capsule, Rfl: 3    Lactobacillus acidophilus (Probiotic) 10 billion cell capsule, Take 1 capsule by mouth once daily., Disp: , Rfl:     metoprolol succinate XL (Toprol-XL) 25 mg 24 hr tablet, Take 1 tablet (25 mg) by mouth once daily. Do not crush or chew., Disp: 90 tablet, Rfl: 3    mirabegron (Mybetriq) 25 mg tablet extended release 24 hr 24 hr tablet, Take 1 tablet (25 mg) by mouth once daily. (Patient taking differently: Take 1 tablet (25 mg) by mouth once daily. Pt hasn't started medication), Disp: 90 tablet, Rfl: 2    trospium (Sanctura) 20 mg tablet, Take 1 tablet (20 mg) by mouth 2 times a day., Disp: 60 tablet, Rfl: 11      Objective:  /80 (BP Location: Right arm, Patient Position: Sitting, BP Cuff Size: Adult)   Pulse 75   Temp 36 °C (96.8 °F) (Temporal)   Ht 1.702 m (5' 7\")   Wt 70.5 kg (155 lb 8 oz)   BMI 24.35 kg/m²     Gen: NAD  Neuro:  --HIF: A&O X 3, repetition and naming intact  --CN:  PERRLA, EOMI, VFF, no visible facial asymmetry, facial sensation intact, no tongue or palatal deviation, SCM intact  --Motor: Moves all 4 extremities equally; no focal deficits; side to side head " tremors; postural hand tremor  --Sensory: Intact to light touch, intact to pinprick  --Reflex: 2+ symmetric, toes down  --Cerebellum: FTN and HTS intact  --Gait: Normal, narrow based gait    Relevant Results      Assessment:   Essential Tremor - she has a side to side head tremor and a left>right hand tremor  - reviewed options; primidone interacts with eliquis; topirmate increases her risk of kidney stones (which she has had in the past)  - we discussed possibly increasing Metoprolol - patient declines for onw  - will continue to monitor    2.  Hypnopompic Hallucinations  - occurs rarely  - reassured patient    Follow up in 1 year      Lalo Barone MD  [facility]  Department of Neurology    I spent 30 minutes in the professional and overall care of this patient.

## 2024-11-22 ENCOUNTER — TELEPHONE (OUTPATIENT)
Dept: PRIMARY CARE | Facility: CLINIC | Age: 79
End: 2024-11-22
Payer: COMMERCIAL

## 2024-11-22 DIAGNOSIS — R92.8 ABNORMAL MAMMOGRAM: Primary | ICD-10-CM

## 2024-11-22 DIAGNOSIS — N60.02 CYST OF LEFT BREAST: ICD-10-CM

## 2024-11-22 NOTE — TELEPHONE ENCOUNTER
3/27/24: screening mamm  5/15/24: diag mamm/US L -> 6m fu rec  11/22/24: diag mamm/US L:  2.0%  lifetime risk of developing breast cancer   IMPRESSION:   Finding 1:  Post-operative changes in the upper outer quadrant of the   left breast are benign.     Finding 2:  Cyst in the left breast at 3 o'clock located 3 cm from the   nipple is probably benign. A follow-up in 6 months is recommended.     Dr Tmi, would she get screening mamm in 6 m since last screening was 3/2024? Or does she need diag bilat, US if needed?

## 2024-11-23 ENCOUNTER — LAB (OUTPATIENT)
Dept: LAB | Facility: LAB | Age: 79
End: 2024-11-23
Payer: COMMERCIAL

## 2024-11-23 DIAGNOSIS — Z77.011 LEAD EXPOSURE: ICD-10-CM

## 2024-11-23 PROCEDURE — 83655 ASSAY OF LEAD: CPT

## 2024-11-23 PROCEDURE — 36415 COLL VENOUS BLD VENIPUNCTURE: CPT

## 2024-11-23 NOTE — TELEPHONE ENCOUNTER
[facility] radiology doesn't say f/up with 'routine screening mammogram' it means they need a diagnostic  So for her bilateral diagnostic bc both breasts need to be done at least annually  Ordered, schedule

## 2024-11-25 LAB
LEAD BLD-MCNC: 1.3 UG/DL
LEAD BLDV-MCNC: NORMAL UG/DL

## 2024-11-26 DIAGNOSIS — N60.09 CYST OF BREAST, UNSPECIFIED LATERALITY: Primary | ICD-10-CM

## 2024-11-26 DIAGNOSIS — R92.8 OTHER ABNORMAL AND INCONCLUSIVE FINDINGS ON DIAGNOSTIC IMAGING OF BREAST: ICD-10-CM

## 2024-11-27 ENCOUNTER — TREATMENT (OUTPATIENT)
Dept: PHYSICAL THERAPY | Facility: CLINIC | Age: 79
End: 2024-11-27
Payer: COMMERCIAL

## 2024-11-27 ENCOUNTER — APPOINTMENT (OUTPATIENT)
Dept: PHYSICAL THERAPY | Facility: CLINIC | Age: 79
End: 2024-11-27
Payer: COMMERCIAL

## 2024-11-27 DIAGNOSIS — M62.89 HIGH-TONE PELVIC FLOOR DYSFUNCTION: Primary | ICD-10-CM

## 2024-11-27 DIAGNOSIS — M53.3 SACROILIAC DYSFUNCTION: ICD-10-CM

## 2024-11-27 PROCEDURE — 97110 THERAPEUTIC EXERCISES: CPT | Mod: GP

## 2024-12-02 ENCOUNTER — APPOINTMENT (OUTPATIENT)
Dept: PHARMACY | Facility: HOSPITAL | Age: 79
End: 2024-12-02
Payer: COMMERCIAL

## 2024-12-02 DIAGNOSIS — I48.0 PAROXYSMAL ATRIAL FIBRILLATION (MULTI): Primary | ICD-10-CM

## 2024-12-02 DIAGNOSIS — I47.10 PAROXYSMAL SUPRAVENTRICULAR TACHYCARDIA (CMS-HCC): ICD-10-CM

## 2024-12-02 PROCEDURE — RXMED WILLOW AMBULATORY MEDICATION CHARGE

## 2024-12-02 NOTE — ASSESSMENT & PLAN NOTE
ASSESSMENT OF ATRIAL FIBRILLATION  Patient is to be on anticoagulation indefinitely  Rationale for plan: The patient is tolerating Eliquis well without frequent signs of bleeding and no signs of clotting. She still requires financial assistance for Eliquis and is not filing taxes, so she will plan to bring in social security benefits letter to her appointment with her PCP on Friday 12/6 for  PAP renewal. She will be renewed at that time. She was reminded of importance of use and potential side effects and demonstrates adherence and understanding of therapy. Okay for annual pharmacy visit unless concerns arise. She confirms having enough supply of Eliquis to last until typical renewal time frame.    Medication Changes:  Continue  Eliquis 5 mg twice daily    Monitoring and Education Discussed:  Counseled patient of side effects that are indicative of bleeding such as dark tarry stool, unexplainable bruising, or vomiting up a coffee ground like substance  Reminded patient of importance of anticoagulation and rate control therapy to prevent risk of heart attack or stroke  Counseled patient on MOA, expectations, duration of therapy, contraindications, administration, and monitoring parameters   Answered all patient questions and concerns

## 2024-12-02 NOTE — PROGRESS NOTES
Clinical Pharmacy Appointment    Patient ID: Adelia Munson is a 79 y.o. female who presents for Atrial Fibrillation.    Pt is here for Follow Up appointment.     Referring Provider: Gracie Tim MD  PCP: Gracie Tim MD   Last visit with PCP: 9/25/2024   Next visit with PCP: 12/6/2024      Subjective     Interval History  Previously approved for Eliquis assistance through [facility]- due for renewal today if still has need  Prefers not to use medications for urinary urgency due to side effects- would be eligible to add Myrbetriq to PAP if she does wish to use it    HPI  ATRIAL FIBRILLATION paroxysmal   Does patient follow with cardiology? yes  Last cardiology appointment: 12/6/2023    Current atrial fibrillations medications include:  Rate Control: metoprolol succinate XL 25 mg daily  Anticoagulation: Eliquis 5 mg twice daily    Clarifications to above regimen: Vaseline in nose to prevent nosebleeds from dry air   Adverse Effects: rarely notes part of eye that appears bloody- has been advised on how to manage by cardiology; the patient has not noted this recently; no other bleeding remarked     Screening for dose adjustment:  No dose change recommended at this time    Diagnosis:  Onset: 2022  Patient is projected to be on anticoagulation indefinitely    [id number] Score: [4]  Age: [<65 (0)] [65-74 (+1)] [> 75 (+2)]: 2  Sex: [Male/Female (+1)]: 1  CHF history: [No/Yes(+1)]: 1  Hypertension history: [No/Yes(+1)]: 0  Stroke/TIA/thromboembolism history: [No/Yes(+2)]: 0  Vascular disease history (prior MI, peripheral artery disease, aortic plaque): [No/Yes(+1)]: 0  Diabetes history: [No/Yes(+1)]: 0    Pertinent Medical History  Medical history:  No significant concerns from previous bleeds, clots, or falls  Social history:  Low risk  Medication history:  No other medications with significant bleed risk    Monitoring:  Date of last BMP: 9/30/2024  Last echo: 7/2022  Recent Hospitalizations: None in previous  year  Recent Falls/Trauma: none  Changes in Tobacco or Alcohol Intake: no changes; infrequent social alcohol use     Medication Reconciliation:  Discontinued: Myrbetriq, trospium (concerned of side effects)    Drug Interactions  The following drug interactions were noted: multiple anticholinergic medications    Medication System Management  Patient's preferred pharmacy: [facility]  Adherence/Organization: uses a log for keeping track of her medications and also uses pillbox  Affordability/Accessibility: previously qualified for VAF     Patient Assistance Screening (VAF)  Patient verbally reports monthly or yearly income which is less than 400% federal poverty level  Application for program has been submitted for the following medications:   Eliquis 5 mg BID  Patient aware this process may take up to 2 weeks once income documents have been sent to the team.  If approved, medication must be filled through [facility] pharmacy and may be picked up or mailed to patient.   If approved, medication will be billed through insurance, and patient assistance team will pay the copay. This will result in a $0 copay for the patient.  Counseled patient on mechanism of action, side effects, contraindications, and what to do if the patient misses a dose. All patients questions were answered.        Objective   Allergies   Allergen Reactions    Amoxicillin-Pot Clavulanate Dizziness    Celecoxib GI Upset     Agent: Celebrex    Diazepam Unknown    Enoxaparin Hives and Swelling     Agent: Lovenox    Gabapentin GI Upset    Lexapro [Escitalopram Oxalate] Diarrhea, Other, Cough, Headache and Hallucinations     Also, Jaw clenching  Tried multiple doses     Meloxicam Unknown     Agent: Mobic    Mometasone Unknown     Agent: Nasonex    Naproxen Unknown     Agent: Naprosyn    Nitrate Analogues Nausea/vomiting     Tingling in both palms    Nitrofurantoin Macrocrystal Unknown     Agent: Macrodantin    Piroxicam Unknown     Agent:  "Feldene    Propranolol Other     Reaction: Anxiety    Ranitidine Hcl Unknown     Agent: Zantac    Amitriptyline Swelling, Rash and Other     Reaction: Topical cream form used on ankle     Social History     Social History Narrative    Never     No kids    Retired    Nonsmoker    Social ETOH    ---    Family History:    M:  Alzheimer's Disease, Fell, broke hip/OP, tremor ( age 90)    F:  'old age' at 92    B:  None                                                          B:  Lipids, HTN                     B: Hearing Loss, HTN    S:  Anemia, dementia onset around 79 yo     S: oral CA, stroke ( age 55)    MUncle:  Colon Cancer    PAunts:Breast Cancer       x2 and cousin      Medication Review  Current Outpatient Medications   Medication Instructions    apixaban (ELIQUIS) 5 mg, oral, 2 times daily    calcium carbonate-vitamin D3 600 mg-20 mcg (800 unit) tablet 2 tablets, Daily    esomeprazole (NEXIUM) 20 mg, oral, Daily    Lactobacillus acidophilus (Probiotic) 10 billion cell capsule 1 capsule, Daily    metoprolol succinate XL (TOPROL-XL) 25 mg, oral, Daily, Do not crush or chew.      Vitals  BP Readings from Last 2 Encounters:   24 122/80   24 106/65     BMI Readings from Last 1 Encounters:   24 24.35 kg/m²      Labs  A1C  No results found for: \"HGBA1C\"  BMP  Lab Results   Component Value Date    CALCIUM 9.9 2024     2024    K 4.4 2024    CO2 32 2024     2024    BUN 17 2024    CREATININE 0.94 2024    EGFR 62 2024     LFTs  Lab Results   Component Value Date    ALT 13 2024    AST 17 2024    ALKPHOS 27 (L) 2024    BILITOT 0.5 2024     FLP  Lab Results   Component Value Date    TRIG 109 2024    CHOL 183 2024    LDLF 104 (H) 2022    LDLCALC 99 2024    HDL 62.5 2024     Urine Microalbumin  No results found for: \"MICROALBCREA\"  Weight Management  Wt Readings from Last 3 " Encounters:   11/21/24 70.5 kg (155 lb 8 oz)   09/25/24 69.7 kg (153 lb 9.6 oz)   08/30/24 70.6 kg (155 lb 9.6 oz)      There is no height or weight on file to calculate BMI.     Assessment/Plan   Problem List Items Addressed This Visit       Paroxysmal atrial fibrillation (Multi) - Primary     ASSESSMENT OF ATRIAL FIBRILLATION  Patient is to be on anticoagulation indefinitely  Rationale for plan: The patient is tolerating Eliquis well without frequent signs of bleeding and no signs of clotting. She still requires financial assistance for Eliquis and is not filing taxes, so she will plan to bring in social security benefits letter to her appointment with her PCP on Friday 12/6 for  PAP renewal. She will be renewed at that time. She was reminded of importance of use and potential side effects and demonstrates adherence and understanding of therapy. Okay for annual pharmacy visit unless concerns arise. She confirms having enough supply of Eliquis to last until typical renewal time frame.    Medication Changes:  Continue  Eliquis 5 mg twice daily    Monitoring and Education Discussed:  Counseled patient of side effects that are indicative of bleeding such as dark tarry stool, unexplainable bruising, or vomiting up a coffee ground like substance  Reminded patient of importance of anticoagulation and rate control therapy to prevent risk of heart attack or stroke  Counseled patient on MOA, expectations, duration of therapy, contraindications, administration, and monitoring parameters   Answered all patient questions and concerns          Relevant Medications    apixaban (Eliquis) 5 mg tablet    Paroxysmal supraventricular tachycardia (CMS-HCC)    Relevant Medications    apixaban (Eliquis) 5 mg tablet       Clinical Pharmacist follow-up: as needed for questions, next due in late November 2025, Telehealth visit    Continue all meds under the continuation of care with the referring provider and clinical pharmacy  team.    Thank you,  Allison Wood, PharmD  Clinical Pharmacist  [phone]    Verbal consent to manage patient's drug therapy was obtained from the patient. They were informed they may decline to participate or withdraw from participation in pharmacy services at any time.

## 2024-12-02 NOTE — Clinical Note
Patient is seeing PCP on 12/6 and plans to bring copy of social security letter to office that day. Please scan that copy and send to me by email [email or web address].

## 2024-12-04 ENCOUNTER — APPOINTMENT (OUTPATIENT)
Dept: PHYSICAL THERAPY | Facility: CLINIC | Age: 79
End: 2024-12-04
Payer: COMMERCIAL

## 2024-12-04 ENCOUNTER — APPOINTMENT (OUTPATIENT)
Dept: CARDIOLOGY | Facility: CLINIC | Age: 79
End: 2024-12-04
Payer: COMMERCIAL

## 2024-12-04 VITALS
HEART RATE: 64 BPM | WEIGHT: 154.6 LBS | HEIGHT: 67 IN | DIASTOLIC BLOOD PRESSURE: 77 MMHG | SYSTOLIC BLOOD PRESSURE: 124 MMHG | BODY MASS INDEX: 24.27 KG/M2

## 2024-12-04 DIAGNOSIS — M62.89 HIGH-TONE PELVIC FLOOR DYSFUNCTION: Primary | ICD-10-CM

## 2024-12-04 DIAGNOSIS — I48.0 PAROXYSMAL ATRIAL FIBRILLATION (MULTI): Primary | ICD-10-CM

## 2024-12-04 DIAGNOSIS — M53.3 SACROILIAC DYSFUNCTION: ICD-10-CM

## 2024-12-04 PROCEDURE — 1123F ACP DISCUSS/DSCN MKR DOCD: CPT | Performed by: INTERNAL MEDICINE

## 2024-12-04 PROCEDURE — 1159F MED LIST DOCD IN RCRD: CPT | Performed by: INTERNAL MEDICINE

## 2024-12-04 PROCEDURE — 1157F ADVNC CARE PLAN IN RCRD: CPT | Performed by: INTERNAL MEDICINE

## 2024-12-04 PROCEDURE — 99213 OFFICE O/P EST LOW 20 MIN: CPT | Performed by: INTERNAL MEDICINE

## 2024-12-04 PROCEDURE — 1036F TOBACCO NON-USER: CPT | Performed by: INTERNAL MEDICINE

## 2024-12-04 NOTE — PROGRESS NOTES
Subjective:  The patient is a 79-year-old single white female, followed primarily by Dr. Gracie Tim and from a cardiology standpoint by Dr. Ash Burnett, who presents for follow-up of atrial arrhythmias.  She had documented atrial flutter with 2:1 AV conduction in July 2022 when she was hospitalized with colitis.  She was rate controlled with beta-blockers and anticoagulated and converted spontaneously to sinus rhythm.  A subsequent echocardiogram and nuclear stress test were normal.  A Holter monitor in August 2022 and a cardiac event monitor in October 2022 both failed to show any atrial arrhythmia recurrences.  The patient has continued on beta-blocker therapy along with Eliquis anticoagulation because of her event.    The patient has a history of benign essential tremor and is being followed by neurologist Dr. Barone for this.  One of the medications he would like to try has an interaction with Eliquis and could not be utilized.  The patient is also known to have a left anterior fascicular block on her EKGs, and an incomplete right bundle branch block on some but not all prior EKGs.    The patient lives locally in an apartment.  She has some brothers and nieces and nephews who live locally.  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    Current Outpatient Medications   Medication Sig    apixaban (Eliquis) 5 mg tablet Take 1 tablet (5 mg) by mouth 2 times a day.    calcium carbonate-vitamin D3 600 mg-20 mcg (800 unit) tablet Take 2 tablets by mouth once daily.    esomeprazole (NexIUM) 20 mg DR capsule Take 1 capsule (20 mg) by mouth once daily.    Lactobacillus acidophilus (Probiotic) 10 billion cell capsule Take 1 capsule by mouth once daily.    metoprolol succinate XL (Toprol-XL) 25 mg 24 hr tablet Take 1 tablet (25 mg) by mouth once daily. Do not crush or chew.     Allergies:  Amoxicillin-pot clavulanate,  Celecoxib, Diazepam, Enoxaparin, Gabapentin, Lexapro [escitalopram oxalate], Meloxicam, Mometasone, Naproxen, Nitrate analogues, Nitrofurantoin macrocrystal, Piroxicam, Propranolol, Ranitidine hcl, and Amitriptyline     Patient Active Problem List   Diagnosis    Abnormal bladder cytology    Acquired cyst of kidney    Anxiety    Atrial flutter (Multi)    Cervical disc disease    Thoracic disc disease    Chronic GERD    Constipation    Diastolic dysfunction, left ventricle    Disorder of bone density and structure, unspecified    Diverticulosis of colon    Elevated homocysteine    Emphysema lung (Multi)    Essential tremor    Grade I hemorrhoids    Hepatic cyst    Lumbar spondylosis    Neuropathic pain of left lower extremity    Nocturia    Obstructive sleep apnea    Osteoarthritis, knee    Paroxysmal atrial fibrillation (Multi)    Paroxysmal supraventricular tachycardia (CMS-HCC)    Spondylosis of cervical region without myelopathy or radiculopathy    Urethral stricture    Vitamin B12 deficiency    Vitamin D deficiency    Routine adult health maintenance    Advanced directives, counseling/discussion    Cardiac risk counseling    H/O esophagitis    H/O gastritis    Screening for multiple conditions    CKD (chronic kidney disease) stage 2, GFR 60-89 ml/min    Current moderate episode of major depressive disorder without prior episode (Multi)    Lumbar disc disease    Encounter for screening mammogram for breast cancer    Menopause    Cataract of both eyes    Abnormal mammogram    RLQ abdominal pain    Sacroiliac joint dysfunction    Breast cyst    High-tone pelvic floor dysfunction    Sacroiliac dysfunction    Hallucinations     Past Surgical History:   Procedure Laterality Date    APPENDECTOMY      BREAST BIOPSY      Biopsy Breast Percutaneous Needle Core, 10/18: Right breast mass, needle localized excisional biopsy (A)  Proliferative epithelial changes including radial sclerosing lesions, sclerosing adenosis and  usual ductal hyperplasia (please see comment)  Microcalcifications present in proliferative epithelial changes and unremarkable lobules    BREAST BIOPSY  10/2018    sclerosing lesions, sclerosing adenosis and usual ductal hyperplasia 1. Right breast mass, needle localized excisional biopsy (A)  Proliferative epithelial changes including radial sclerosing lesions, sclerosing adenosis and usual ductal hyperplasia. Microcalcifications present in proliferative epithelial changes and unremarkable lobules/Biopsy clip and biopsy site reparative changes identified    BREAST BIOPSY      pt 2:2. Right breast, new medial margin, excision (B)  Fibrofatty breast tissue 3. Left breast, needle localized excisional biopsy (C)  Proliferative epithelial changes including radial sclerosing lesions, sclerosing adenosis and usual ductal hyperplasia (please see comment)  Microcalcifications present in proliferative epithelial changes and unremarkable lobules    BREAST BIOPSY      pt 3: Biopsy clip and biopsy site reparative changes identified 4. Left breast, new medial margin, excision (D)  Proliferative epithelial changes    BREAST LUMPECTOMY      CARDIAC CATHETERIZATION  09/14/2020 9/13: normal POSTOPERATIVE DIAGNOSES: 1. Normal LEFT MAIN. 2. Normal LAD. 3. Normal CIRCUMFLEX. 4. Normal RIGHT CORONARY ARTERY. 5. Normal left ventricular function, ejection fraction of 70%.    CATARACT EXTRACTION Bilateral     Feb. 2024    COLONOSCOPY  10/05/2021    1/11: Hemorrhoids; normal (8); diverticulosis, biopsy: (-) 9/2019: Diverticulosis, polyp:  Ascending colon, polypectomy (C) - Fragments of tubular adenoma.    COLONOSCOPY  03/28/2024    divertiuculosis    CONDYLOMA EXCISION/FULGURATION  1980    CYSTOSCOPY  10/05/2021    6/21: Cystoscopy Findings: atrophic vaginitis.  Cystoscopy today reveals a urethral stricture dilated to 20 Pakistani with minimal pain. Once inside the bladder there is severe erythema in the trigone. Mild trabeculations. No  "evidence of stones or tumors. Flow rate 9 cc/s, total volume 172 cc, PVR 61 cc.    ENDOMETRIAL ABLATION      ESOPHAGOGASTRODUODENOSCOPY  10/05/2021    6/21: Cystoscopy Findings: atrophic vaginitis.  Cystoscopy today reveals a urethral stricture dilated to 20 Lithuanian with minimal pain. Once inside the bladder there is severe erythema in the trigone. Mild trabeculations. No evidence of stones or tumors. Flow rate 9 cc/s, total volume 172 cc, PVR 61 cc.    ESOPHAGOGASTRODUODENOSCOPY  03/28/2024    gastritis, healthy gastric bypass    FLEXOR TENDON OF FOREARM / WRIST REPAIR      OTHER SURGICAL HISTORY      Adjacent Tissue Transfer    POLYSOMNOGRAPHY  06/14/2023    PSG: Mild SHIMA, O2  90%, AHI 8.4. 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  HEENT: Remarkable for prominent resting tremor of head and neck.  Neck: No jugular venous distention or thyromegaly.  Lungs: Clear to auscultation, with no wheezes or rales.  Heart: Regular rhythm with grade 1/6 systolic ejection murmur and preserved S2.  Abdomen: Benign, with no organomegaly or masses.  Extremities: Intact distal pulses; no edema.  Neuro: Trivial tremor of hands.  Skin: No cutaneous lesions.    Impressions:  1.  Paroxysmal atrial flutter, documented on just 1 occasion back in July 2022 and the patient was ill with colitis.  She has not had documented recurrences but continues on a straightforward \"rate control strategy\" with beta-blockers, along with Eliquis anticoagulation, given her [id number] score of at least 3 (female gender, 2 " points for age over 75).  2.  Minor conduction system disease, with left anterior fascicular block, again noted on recent EKG from 5/28/2024.  An intermittent right bundle branch block has been noted in the past as well.  There has been no evidence of high-grade AV block.  3.  Benign essential tremor, being followed by neurology.  At one point, the patient had been prescribed propranolol to replace metoprolol, but it does not appear that she ever took this long-term.    Recommendations:  1.  The patient will continue her current medical regimen.  2.  She is certainly welcome to a trial off Eliquis for a few months if a new neurologic agent is felt to be indicated to treat her tremor.  3.  I recommend cardiac event monitoring every 3 years or so, to help exclude any occult atrial fibrillation or flutter.  If this rhythm is again seen in the future, the patient could be considered for specific antiarrhythmic therapy (e.g. flecainide) or for ablation options.      Stevenson Crowell MD

## 2024-12-06 ENCOUNTER — PHARMACY VISIT (OUTPATIENT)
Dept: PHARMACY | Facility: CLINIC | Age: 79
End: 2024-12-06
Payer: MEDICARE

## 2024-12-06 ENCOUNTER — APPOINTMENT (OUTPATIENT)
Dept: PRIMARY CARE | Facility: CLINIC | Age: 79
End: 2024-12-06
Payer: COMMERCIAL

## 2024-12-06 VITALS
HEART RATE: 67 BPM | TEMPERATURE: 98.6 F | OXYGEN SATURATION: 98 % | BODY MASS INDEX: 24.79 KG/M2 | HEIGHT: 66 IN | WEIGHT: 154.25 LBS | DIASTOLIC BLOOD PRESSURE: 70 MMHG | SYSTOLIC BLOOD PRESSURE: 129 MMHG

## 2024-12-06 DIAGNOSIS — G25.0 ESSENTIAL TREMOR: ICD-10-CM

## 2024-12-06 DIAGNOSIS — I48.92 ATRIAL FLUTTER, UNSPECIFIED TYPE (MULTI): ICD-10-CM

## 2024-12-06 DIAGNOSIS — Z86.59 HISTORY OF DEPRESSION: ICD-10-CM

## 2024-12-06 DIAGNOSIS — E53.8 VITAMIN B12 DEFICIENCY: ICD-10-CM

## 2024-12-06 DIAGNOSIS — Z00.00 ROUTINE ADULT HEALTH MAINTENANCE: Primary | Chronic | ICD-10-CM

## 2024-12-06 DIAGNOSIS — M62.89 HIGH-TONE PELVIC FLOOR DYSFUNCTION: ICD-10-CM

## 2024-12-06 DIAGNOSIS — F41.9 ANXIETY: ICD-10-CM

## 2024-12-06 DIAGNOSIS — I47.10 PAROXYSMAL SUPRAVENTRICULAR TACHYCARDIA (CMS-HCC): ICD-10-CM

## 2024-12-06 DIAGNOSIS — Z87.19 H/O ESOPHAGITIS: ICD-10-CM

## 2024-12-06 DIAGNOSIS — K21.9 CHRONIC GERD: ICD-10-CM

## 2024-12-06 DIAGNOSIS — Z71.89 ADVANCED DIRECTIVES, COUNSELING/DISCUSSION: ICD-10-CM

## 2024-12-06 DIAGNOSIS — I48.0 PAROXYSMAL ATRIAL FIBRILLATION (MULTI): ICD-10-CM

## 2024-12-06 DIAGNOSIS — N18.2 CKD (CHRONIC KIDNEY DISEASE) STAGE 2, GFR 60-89 ML/MIN: ICD-10-CM

## 2024-12-06 DIAGNOSIS — J43.8 OTHER EMPHYSEMA (MULTI): ICD-10-CM

## 2024-12-06 DIAGNOSIS — Z87.19 H/O GASTRITIS: ICD-10-CM

## 2024-12-06 DIAGNOSIS — Z71.89 CARDIAC RISK COUNSELING: ICD-10-CM

## 2024-12-06 DIAGNOSIS — Z13.39 ALCOHOL SCREENING: ICD-10-CM

## 2024-12-06 DIAGNOSIS — E55.9 VITAMIN D DEFICIENCY: ICD-10-CM

## 2024-12-06 DIAGNOSIS — Z13.89 SCREENING FOR MULTIPLE CONDITIONS: ICD-10-CM

## 2024-12-06 DIAGNOSIS — Z13.9 ENCOUNTER FOR SCREENING INVOLVING SOCIAL DETERMINANTS OF HEALTH (SDOH): ICD-10-CM

## 2024-12-06 PROBLEM — N60.09 BREAST CYST: Status: RESOLVED | Noted: 2024-06-25 | Resolved: 2024-12-06

## 2024-12-06 PROBLEM — M53.3 SACROILIAC DYSFUNCTION: Status: RESOLVED | Noted: 2024-09-26 | Resolved: 2024-12-06

## 2024-12-06 PROBLEM — R92.8 ABNORMAL MAMMOGRAM: Status: RESOLVED | Noted: 2024-03-27 | Resolved: 2024-12-06

## 2024-12-06 PROBLEM — M53.3 SACROILIAC JOINT DYSFUNCTION: Status: RESOLVED | Noted: 2024-06-25 | Resolved: 2024-12-06

## 2024-12-06 PROBLEM — R10.31 RLQ ABDOMINAL PAIN: Status: RESOLVED | Noted: 2024-06-25 | Resolved: 2024-12-06

## 2024-12-06 PROCEDURE — 99214 OFFICE O/P EST MOD 30 MIN: CPT | Performed by: INTERNAL MEDICINE

## 2024-12-06 PROCEDURE — G0439 PPPS, SUBSEQ VISIT: HCPCS | Performed by: INTERNAL MEDICINE

## 2024-12-06 PROCEDURE — G0444 DEPRESSION SCREEN ANNUAL: HCPCS | Performed by: INTERNAL MEDICINE

## 2024-12-06 PROCEDURE — 99497 ADVNCD CARE PLAN 30 MIN: CPT | Performed by: INTERNAL MEDICINE

## 2024-12-06 PROCEDURE — 1159F MED LIST DOCD IN RCRD: CPT | Performed by: INTERNAL MEDICINE

## 2024-12-06 PROCEDURE — 99397 PER PM REEVAL EST PAT 65+ YR: CPT | Performed by: INTERNAL MEDICINE

## 2024-12-06 PROCEDURE — G0446 INTENS BEHAVE THER CARDIO DX: HCPCS | Performed by: INTERNAL MEDICINE

## 2024-12-06 PROCEDURE — 1036F TOBACCO NON-USER: CPT | Performed by: INTERNAL MEDICINE

## 2024-12-06 PROCEDURE — 1157F ADVNC CARE PLAN IN RCRD: CPT | Performed by: INTERNAL MEDICINE

## 2024-12-06 PROCEDURE — 1123F ACP DISCUSS/DSCN MKR DOCD: CPT | Performed by: INTERNAL MEDICINE

## 2024-12-06 PROCEDURE — 1160F RVW MEDS BY RX/DR IN RCRD: CPT | Performed by: INTERNAL MEDICINE

## 2024-12-06 RX ORDER — METOPROLOL SUCCINATE 25 MG/1
25 TABLET, EXTENDED RELEASE ORAL DAILY
Qty: 90 TABLET | Refills: 3 | Status: SHIPPED | OUTPATIENT
Start: 2024-12-06 | End: 2025-12-06

## 2024-12-06 RX ORDER — HYDROGEN PEROXIDE 3 %
20 SOLUTION, NON-ORAL MISCELLANEOUS DAILY
Qty: 90 CAPSULE | Refills: 3 | Status: SHIPPED | OUTPATIENT
Start: 2024-12-06

## 2024-12-06 SDOH — ECONOMIC STABILITY: INCOME INSECURITY: IN THE LAST 12 MONTHS, WAS THERE A TIME WHEN YOU WERE NOT ABLE TO PAY THE MORTGAGE OR RENT ON TIME?: NO

## 2024-12-06 SDOH — ECONOMIC STABILITY: FOOD INSECURITY: WITHIN THE PAST 12 MONTHS, THE FOOD YOU BOUGHT JUST DIDN'T LAST AND YOU DIDN'T HAVE MONEY TO GET MORE.: NEVER TRUE

## 2024-12-06 SDOH — ECONOMIC STABILITY: TRANSPORTATION INSECURITY
IN THE PAST 12 MONTHS, HAS LACK OF TRANSPORTATION KEPT YOU FROM MEETINGS, WORK, OR FROM GETTING THINGS NEEDED FOR DAILY LIVING?: NO

## 2024-12-06 SDOH — ECONOMIC STABILITY: FOOD INSECURITY: WITHIN THE PAST 12 MONTHS, YOU WORRIED THAT YOUR FOOD WOULD RUN OUT BEFORE YOU GOT MONEY TO BUY MORE.: NEVER TRUE

## 2024-12-06 SDOH — ECONOMIC STABILITY: TRANSPORTATION INSECURITY
IN THE PAST 12 MONTHS, HAS THE LACK OF TRANSPORTATION KEPT YOU FROM MEDICAL APPOINTMENTS OR FROM GETTING MEDICATIONS?: NO

## 2024-12-06 ASSESSMENT — PATIENT HEALTH QUESTIONNAIRE - PHQ9
2. FEELING DOWN, DEPRESSED OR HOPELESS: NOT AT ALL
1. LITTLE INTEREST OR PLEASURE IN DOING THINGS: NOT AT ALL
SUM OF ALL RESPONSES TO PHQ9 QUESTIONS 1 AND 2: 0

## 2024-12-06 ASSESSMENT — SOCIAL DETERMINANTS OF HEALTH (SDOH): IN THE PAST 12 MONTHS, HAS THE ELECTRIC, GAS, OIL, OR WATER COMPANY THREATENED TO SHUT OFF SERVICE IN YOUR HOME?: NO

## 2024-12-06 NOTE — PROGRESS NOTES
"Annual Medicare Wellness Exam/Comprehensive Problem Focused Follow Up  HPI/CC  Chief Complaint   Patient presents with    Medicare Annual Wellness Visit Subsequent     Last year we increased lexapro, caused AE (cough went away off it)  Doesn't think she feels depressed  More anxiety    Had hallucinations at night  Was a very stressful week  Had one last week (man coming out her closet door)  Had watched a scary movie    Last seen in June- Ervin didn't take $$  Saw GYN (copied)   right-sided myofascial pelvic floor tenderness that reproduces symptoms limited hypertonicity on the left side   Start Myrbetriq  Pelvic floor therapy  Consider pelvic floor Botox or Soledad    Didnt take Mybetriq either=-$$  Has been doing pelvic floor PT, helping    Hands cramping up  At night  Gets 6 glasses water a day    Felt like her potassium pill got stuck 1month ago  improving    Saw Cardiology (copied)  Impressions:  1.  Paroxysmal atrial flutter, documented on just 1 occasion back in July 2022 and the patient was ill with colitis.  She has not had documented recurrences but continues on a straightforward \"rate control strategy\" with beta-blockers, along with Eliquis anticoagulation, given her [id number] score of at least 3 (female gender, 2 points for age over 75).  2.  Minor conduction system disease, with left anterior fascicular block, again noted on recent EKG from 5/28/2024.  An intermittent right bundle branch block has been noted in the past as well.  There has been no evidence of high-grade AV block.  3.  Benign essential tremor, being followed by neurology.  At one point, the patient had been prescribed propranolol to replace metoprolol, but it does not appear that she ever took this long-term.  Recommendations:  1.  The patient will continue her current medical regimen.  2.  She is certainly welcome to a trial off Eliquis for a few months if a new neurologic agent is felt to be indicated to treat her tremor.  3.  I " recommend cardiac event monitoring every 3 years    Saw Neurology for her tremor:  Essential Tremor - she has a side to side head tremor and a left>right hand tremor  - reviewed options; primidone interacts with eliquis; topirmate increases her risk of kidney stones (which she has had in the past)  - we discussed possibly increasing Metoprolol - patient declines for onw  - will continue to monitor    Labs reviewed:  Component      Latest Ref Rng 9/30/2024   WBC      4.4 - 11.3 x10*3/uL 4.2 (L)    nRBC      0.0 - 0.0 /100 WBCs 0.0    RBC      4.00 - 5.20 x10*6/uL 4.43    HEMOGLOBIN      12.0 - 16.0 g/dL 13.4    HEMATOCRIT      36.0 - 46.0 % 40.8    MCV      80 - 100 fL 92    MCH      26.0 - 34.0 pg 30.2    MCHC      32.0 - 36.0 g/dL 32.8    RED CELL DISTRIBUTION WIDTH      11.5 - 14.5 % 12.8    Platelets      150 - 450 x10*3/uL 214    Neutrophils %      40.0 - 80.0 % 61.9    Immature Granulocytes %, Automated      0.0 - 0.9 % 0.5    Lymphocytes %      13.0 - 44.0 % 24.0    Monocytes %      2.0 - 10.0 % 11.0    Eosinophils %      0.0 - 6.0 % 2.1    Basophils %      0.0 - 2.0 % 0.5    Neutrophils Absolute      1.60 - 5.50 x10*3/uL 2.60    Immature Granulocytes Absolute, Automated      0.00 - 0.50 x10*3/uL 0.02    Lymphocytes Absolute      0.80 - 3.00 x10*3/uL 1.01    Monocytes Absolute      0.05 - 0.80 x10*3/uL 0.46    Eosinophils Absolute      0.00 - 0.40 x10*3/uL 0.09    Basophils Absolute      0.00 - 0.10 x10*3/uL 0.02    GLUCOSE      74 - 99 mg/dL 90    SODIUM      136 - 145 mmol/L 143    POTASSIUM      3.5 - 5.3 mmol/L 4.4    CHLORIDE      98 - 107 mmol/L 104    Bicarbonate      21 - 32 mmol/L 32    Anion Gap      10 - 20 mmol/L 11    Blood Urea Nitrogen      6 - 23 mg/dL 17    Creatinine      0.50 - 1.05 mg/dL 0.94    EGFR      >60 mL/min/1.73m*2 62    Calcium      8.6 - 10.6 mg/dL 9.9    Albumin      3.4 - 5.0 g/dL 4.2    Alkaline Phosphatase      33 - 136 U/L 27 (L)    Total Protein      6.4 - 8.2 g/dL 6.6     AST      9 - 39 U/L 17    Bilirubin Total      0.0 - 1.2 mg/dL 0.5    ALT      7 - 45 U/L 13    Color, Urine      Light-Yellow, Yellow, Dark-Yellow  Light-Yellow    Appearance, Urine      Clear  Clear    Specific Gravity, Urine      1.005 - 1.035  1.012    pH, Urine      5.0, 5.5, 6.0, 6.5, 7.0, 7.5, 8.0  6.5    Protein, Urine      NEGATIVE, 10 (TRACE), 20 (TRACE) mg/dL NEGATIVE    Glucose, Urine      Normal mg/dL Normal    Blood, Urine      NEGATIVE  NEGATIVE    Ketones, Urine      NEGATIVE mg/dL NEGATIVE    Bilirubin, Urine      NEGATIVE  NEGATIVE    Urobilinogen, Urine      Normal mg/dL Normal    Nitrite, Urine      NEGATIVE  NEGATIVE    Leukocyte Esterase, Urine      NEGATIVE  NEGATIVE    CHOLESTEROL      0 - 199 mg/dL 183    HDL CHOLESTEROL      mg/dL 62.5    Cholesterol/HDL Ratio 2.9    LDL Calculated      <=99 mg/dL 99    VLDL      0 - 40 mg/dL 22    TRIGLYCERIDES      0 - 149 mg/dL 109    Non HDL Cholesterol      0 - 149 mg/dL 121    Lead, Venous      <3.5 ug/dL 1.3   Extra Tube    Vitamin D, 25-Hydroxy, Total      30 - 100 ng/mL 51    Thyroid Stimulating Hormone      0.44 - 3.98 mIU/L 1.00    Sed Rate      0 - 30 mm/h 10    C-Reactive Protein      <1.00 mg/dL 0.92    Vitamin B12      211 - 911 pg/mL 563       WBC  4.4 - 11.3 x10*3/uL 4.5 3.3 Low  R 3.8 Low  R 3.7 Low  R       Assessment and Plan:  Problem List Items Addressed This Visit          High    Routine adult health maintenance - Primary (Chronic)    Overview     Pfizer COVID 19 vaccine 2/26/21, 3/19/21, 10/7/21, 5/11/22,11/08/2022, 9/7/23, 9/21/24  Influenza , 9/30/2016, 10/16/2015, 9/24/21, 9/7/23, 9/23/24  Pneumococcal-13 Vac Conjugate 9/1/2015   Pneumovax 23 8/2/2011, 9/14/20   RSV 9/7/23  Tdap (Age 7+)5/30/2006   Shingrix 2/23/19, 5/13/19  Tetanus Diphtheria Booster (Age >7) Pres Free 6/20/2016   Zostavax5/22/2008  CScope 1/3/12 (8yrs); 9/19 (6yrs); 3/28/24 (11yrs)  Mamm 7/18; 10/30/19; 11/2/20, 12/20/21, 12/22/22, 3/26/24  BMD 6/7/18,  12/20/21, 3/27/24  Hep C Ab (-) 8/24/15  S/p TAHBSO  5/24 CT abd: No AAA         Current Assessment & Plan     Annual Wellness exam completed   Preventive Health History reviewed  Ordered:   Labs    Mammogram   Plan for PCV 20 in 2025            Medium    Advanced directives, counseling/discussion    Overview     12/6/24: HCPOA: Trav Munson (Brother)  DNR Arrest         Alcohol screening    Overview     12/06/24: 5 minutes spent screening for alcohol misuse  See snapshot (social documentation) for details.           Anxiety    Overview     Diazepam made anxiety worse  On lexapro in past (caused AE, cough)  Declines additional medication         Current Assessment & Plan     Will do Genesight testing first  Maybe benzo would be helpful (might help ET als)         Relevant Orders    TSH with reflex to Free T4 if abnormal    Atrial flutter (Multi)    Overview     s/p EP cardiology consult 2022  [id number] score of 3 for age and female gender.   Continue with Eliquis, BB   Monitor  Stable           Relevant Medications    metoprolol succinate XL (Toprol-XL) 25 mg 24 hr tablet    Cardiac risk counseling    Overview     12/6/24 Current 10-Year ASCVD Risk:  21% - Intermediate Risk    Avoid ASA given gastrtis/esophagitis (also on Eliquis)         Chronic GERD    Overview     EGD 9/19: - LA Grade A reflux esophagitis. Biopsied.             - Benign-appearing esophageal stenosis. Dilated.             - Chronic gastritis. Biopsied (-)  On Nexium         Relevant Medications    esomeprazole (NexIUM) 20 mg DR capsule    CKD (chronic kidney disease) stage 2, GFR 60-89 ml/min    Overview     GFR 61 in 6/24  Avoid NSAIDs  Hydrate well daily  Consider Jardiance to preserve kidney function         Relevant Orders    Comprehensive Metabolic Panel    CBC and Auto Differential    Lipid Panel    Urinalysis with Reflex Microscopic    Albumin-Creatinine Ratio, Urine Random    Emphysema lung (Multi)    Overview     PFTs 11/22; no  obstruction, air trapping and reduced DLCO.   CXR in 9/2022 shows emphysematous changes.   CT/PE was normal.   Normal echo  S/p Pulm consult  A1AT testing (-)  Asymptomatic  Monitor         Encounter for screening involving social determinants of health (SDoH)    Overview     12/06/24: 5 minutes spent on SDOH screening.  Specifically: Housing, Food Insecurity, Utilities and Transportatin Needs were evaluated   (See Screenings in Rooming section for documentation)           Essential tremor    Overview     On BB, could consider Propranolol in place of Toprol  S/p Neuro consult  Primidone interacts with Eliquis  Tried Gabapentin for sleep and thinks she had GI side effects         Current Assessment & Plan     ? If benzo would help this as well as her anxiety           H/O esophagitis    Overview     EGD 9/19: Impression:     - LA Grade A reflux esophagitis. Biopsied.             - Benign-appearing esophageal stenosis. Dilated.             - Chronic gastritis. Biopsied.  On PPI         H/O gastritis    Overview     CT 7/22; Stomach is decompressed, slightly limiting its evaluation with equivocal mucosal hyperemia and submucosal edema, somewhat suspicious for acute gastritis.  Continue PPI         High-tone pelvic floor dysfunction    Overview     S/P GYN consult  Doing Pelvic Floor PT         History of depression    Overview     Lexapro, Elavil and Valium caused AE  Declines medical therapy now  Sees psychiatry  Consider Genesight testing         Current Assessment & Plan     Will do genesight testing given med intolerances         Paroxysmal atrial fibrillation (Multi)    Overview     7/5/22: Atrial fibrillation with rapid ventricular response, ? Secondary to colitis/infection  Holter x 40 hours 8/22 did not see AF, but [id number] score of 3  On Eliquis and BB  Sees EP, recommend to have Monitor every 3 years  Monitor         Current Assessment & Plan     Plan for [place] in 2025         Relevant Medications     "apixaban (Eliquis) 5 mg tablet    metoprolol succinate XL (Toprol-XL) 25 mg 24 hr tablet    Paroxysmal supraventricular tachycardia (CMS-HCC)    Overview     Holter 10/22: Sinus karen/tachycardia. , average 74. 741 PVCs (0.05% burden), 8565 PSVCs (0.58%), 23 occurrences of PSVT, longest 34 beats, fastest 162  1 reported event  Sees EP  On BB (and Eliquis due to hx PAF)  Monitor         Relevant Medications    apixaban (Eliquis) 5 mg tablet    metoprolol succinate XL (Toprol-XL) 25 mg 24 hr tablet    Screening for multiple conditions    Overview     Depression screening completed           Vitamin B12 deficiency    Relevant Orders    Vitamin B12    Vitamin D deficiency    Overview     Goal 40-50  on supplement         Relevant Orders    Vitamin D 25-Hydroxy,Total (for eval of Vitamin D levels)       ROS otherwise negative aside from what was mentioned above in HPI.    Vitals  /70   Pulse 67   Temp 37 °C (98.6 °F)   Ht 1.676 m (5' 6\")   Wt 70 kg (154 lb 4 oz)   SpO2 98%   BMI 24.90 kg/m²   Body mass index is 24.9 kg/m².  Physical Exam  Gen: Alert, NAD  HEENT:  Unremarkable  Neck:  No PRITESH  Respiratory:  Lungs CTAB  Cardiovascular:  Heart RRR  Neuro:  Gross motor and sensory intact  Skin:  No suspicious lesions present  Breast: No masses, or axillary lymphadenopathy      Patient Care Team:  Gracie Tim MD as PCP - General (Internal Medicine)  Gracie Tim MD as PCP - [facility] [employer] PCP  Deloris Byrant RN as Care Manager (Case Management)  Ash Burnett MD as Consulting Physician (Cardiology)  Stevenson Crowell MD as Consulting Physician (Cardiology)       Health Risk Assessment:  Patient was asked if he/she has any difficulty performing the following activities of daily living:  Preparing nutritious food and eating? No  Grocery shopping? No  Bathing and grooming yourself? No  Getting dressed? No  Using the toilet?No  Using the phone? No  Moving around from place to place " (physical ambulation)? No  Doing housework (including laundry) independently? No  Managing finances independently? No  Managing medications independently? No  Doing housework (including laundry) independently? No    Patient was asked if he/she:  Feels safe in current home environment?: Yes  Uses seatbelt? Yes  Sees the dentist regularly? Yes  Exercises regularly: Yes  Suffers from depression, stress, anger, loneliness or social isolation, pain, suicidality? No    Dietary issues discussed: Yes  Cognitive Impairment No  Hearing difficulties: No  Visual Acuity assessed: No    What is your self-assessment of overall health status and life satisfaction? Good     5 minutes spent on SDOH screening:   Specifically Housing, Food Insecurity, Utilities and Transportatin Needs were evaluated   (See Screenings in Rooming section for documentation)  Food Insecurity: No Food Insecurity (12/6/2024)    Hunger Vital Sign     Worried About Running Out of Food in the Last Year: Never true     Ran Out of Food in the Last Year: Never true     Housing Stability: Unknown (12/6/2024)    Housing Stability Vital Sign     Unable to Pay for Housing in the Last Year: No     Number of Times Moved in the Last Year: Not on file     Homeless in the Last Year: No     Transportation Needs: No Transportation Needs (12/6/2024)    PRAPARE - Transportation     Lack of Transportation (Medical): No     Lack of Transportation (Non-Medical): No         Allergies and Medications  Amoxicillin-pot clavulanate, Celecoxib, Enoxaparin, Gabapentin, Lexapro [escitalopram oxalate], Meloxicam, Mometasone, Moxifloxacin hcl, Naproxen, Nitrate analogues, Nitrofurantoin macrocrystal, Piroxicam, Propranolol, Ranitidine hcl, and Amitriptyline  Current Outpatient Medications   Medication Instructions    apixaban (ELIQUIS) 5 mg, oral, 2 times daily    calcium carbonate-vitamin D3 600 mg-20 mcg (800 unit) tablet 2 tablets, Daily    esomeprazole (NEXIUM) 20 mg, oral, Daily     Lactobacillus acidophilus (Probiotic) 10 billion cell capsule 1 capsule, Daily    metoprolol succinate XL (TOPROL-XL) 25 mg, oral, Daily, Do not crush or chew.       Medications and Supplements  prescribed by me and other practitioners or clinical pharmacist (such as prescriptions, OTC's, herbal therapies and supplements) were reviewed and documented in the medical record.      Active Problem List  Patient Active Problem List   Diagnosis    Abnormal bladder cytology    Acquired cyst of kidney    Anxiety    Atrial flutter (Multi)    Cervical disc disease    Thoracic disc disease    Chronic GERD    Constipation    Diastolic dysfunction, left ventricle    Disorder of bone density and structure, unspecified    Diverticulosis of colon    Elevated homocysteine    Emphysema lung (Multi)    Essential tremor    Grade I hemorrhoids    Hepatic cyst    Lumbar spondylosis    Neuropathic pain of left lower extremity    Nocturia    Obstructive sleep apnea    Osteoarthritis, knee    Paroxysmal atrial fibrillation (Multi)    Paroxysmal supraventricular tachycardia (CMS-HCC)    Spondylosis of cervical region without myelopathy or radiculopathy    Urethral stricture    Vitamin B12 deficiency    Vitamin D deficiency    Routine adult health maintenance    Advanced directives, counseling/discussion    Cardiac risk counseling    H/O esophagitis    H/O gastritis    Screening for multiple conditions    CKD (chronic kidney disease) stage 2, GFR 60-89 ml/min    History of depression    Lumbar disc disease    Encounter for screening mammogram for breast cancer    Menopause    Cataract of both eyes    High-tone pelvic floor dysfunction    Hallucinations    Alcohol screening    Encounter for screening involving social determinants of health (SDoH)       Comprehensive Medical/Surgical/Social/Family History  Past Medical History:   Diagnosis Date    Abnormal MRI, musculoskeletal     MRI hip 6/22: 1. Atrophy of the left gluteus medius and minimus  consistent with chronic tendon tears with associated trochanteric bursitis. 2. Mild osteoarthrosis of the left hip joint and small left joint effusion. 3. Mild feathery edema involving the left rectus femoris and adductor longus with adjacent intermuscular edema suggestive of mild component of muscular injury.    Abnormal MRI, musculoskeletal     pt 2: MRI Tib/Fib 1/2020: Benign appearing area in the proximal tibial metaphysis, without suspicious features, likely postsurgical defect    Abnormal Pap smear of cervix 1990    Abnormal x-ray of extremity     XR Tib/fib 5/7/21: IMPRESSION: Cortical based lucency with irregularity in the posterior cortex of the proximal tibial diaphysis. This may represent a prior fibrous lesions. Comparison with prior studies is advised. Followup in 6 months advised for stability if no priors available    Abnormal x-ray of extremity     pt 2: XR Knee: lytic lesion proximal third tibia, knee compartments normal (MRI done that shows postsurgical lesion) 1/16/21: Benign appearing areas in the proximal tibial metaphysis, without suspicious features, likley postsurgical defect give pts hx    Anemia     Endometriosis 1990    Fibroid 1990    H/O abdominal ultrasound 06/29/2024    US abd soft tissue: No mass or hernia noted    H/O abdominal x-ray series 03/29/2024    nonobstructive bowel gas pattern.  No evidence of pneumoperitoneum.  Visualized portions of the lung are unremarkable.  No acute bony abnormality.  S-shaped curvature of the thoracolumbar spine.  Slight widening of the pubic symphysi    H/O bone density study 12/20/2021    Ten Year Major Osteoporotic Fracture Risk: 22.52% Ten Year Hip Fracture Risk: 13.6% TScore: 2.2 6/18: THE LOWEST TSCORE IS 1.7, FRAX 10/2%    H/O bone density study 03/27/2024    Lowest T score -2.3. FRACTURE RISK (based on FRAX):                       10-year absolute fracture risk:                           - major osteoporotic fracture = 32.1 %                            - hip fracture = 21.7 %    H/O cervical spine x-ray 04/2019    Mild decrease in the intervertebral disc space at the C56 and C6C7. Moderate facet arthropathy with uncovertebral hypertrophy at C4C5 through C6C7    H/O chest x-ray 08/2014    normal 10/22/21: Mild discogenic degenerative changes are seen throughout the thoracic spine. 7/22: normal    H/O CT scan of abdomen     2/13: liver and kidney cysts 7/22: Stomach is decompressed, slightly limiting its evaluation with equivocal mucosal hyperemia and submucosal edema, somewhat suspicious for acute gastritis. There is mild prominence of mucosal folds and subjective mucosal hyperemia in small bowel, with mesenteric edema, suspicious for infectious/inflammatory enteritis.    H/O CT scan of abdomen     pt 2: Colon is mostly decompressed, with small volume of stool in the proximal colon. Mucosal hyperemia in the colon, most pronounced involving the descending and rectosigmoid colon, with mesocolonic and mesorectal fat stranding, suspicious for acute proctocolitis. No bowel dilation. Mild pelvic ascites, probably reactive in etiology. No pneumoperitoneum or loculated intraperitoneal collection.    H/O CT scan of abdomen     pt 3: Small hepatic and renal cortical cysts. Thoracolumbar scoliosis and lumbar spine degenerative changes. Repeat CT 7/21/22: Imp approved hyperemia of small bowel and distal colon. Mild generalized mesenteric edema persists with small volume fluid which    H/O CT scan of abdomen 05/31/2024    Left lower pole renal cyst with nodular high density component, favoring  hemorrhagic cyst, though should be further assessed via renal CT/MRI. 0.6cm lung nodule right base, stable from 2013, benign. Diverticulosis    H/O CT scan of brain 11/2021    There are scattered dural ossifications most prominently along the left frontal lobe where there is a 34 mm thick 15 mm diameter ossification. No associated soft tissue mass to suggest meningioma is noted.     H/O CT scan of chest 08/2020    CT: NO PE    H/O echocardiogram 04/2005    Trivial MR with no mitral valve prolapse. Normal LV fx, neg for ischemia 11/20: 1.left ventricular systolic function normal,60-65% est ejection fraction. 2. Poorly visualized anatomical struct due to subopt image. 3. Spectral Doppler shows an impaired relaxation pattern of left ventricular diastolic filling. 4. RVSP within normal limits. 5. No significant valvular pathology seen. 6. 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  No suspicious renal mass lesions are  noted in either kidney.    H/O magnetic resonance imaging of brain and brain stem 11/24/2023    Mild white-matter changes are nonspecific but most likely represent small-vessel ischemic disease in a patient of this age and also involve the gonzalo. No evidence of acute ischemic injury or intracranial mass effect.    H/O spine x-ray 06/22/2023    XR Sacrum: Acute angulation at the sacrococcygeal junction which may reflect a prior injury/fracture.    H/O x-ray of lumbar spine 06/22/2023    There is mild anterolisthesis of L3 on L4 and L4 on L5. There is mild multilevel endplate sclerosis and osteophytosis. There is moderate facet disease lower lumbar spine as well    History of colitis     CT 7/22 Stomach is decompressed, slightly limiting its evaluation with equivocal mucosal hyperemia and submucosal edema, somewhat suspicious for acute gastritis. There is mild prominence of mucosal folds and subjective mucosal hyperemia in small bowel, with mesenteric edema, suspicious for infectious/inflammatory enteritis.    History of colitis     pt 2: Colon is mostly decompressed, with small volume of stool in the proximal colon. Mucosal hyperemia in the colon, most pronounced involving the descending and rectosigmoid colon, with m    History of PFTs 11/2022    DLCO  substantially reduced, air trapping also    Holter monitor, abnormal 10/2022    Sinus karen/tachycardia. 61256, average 74. 741 PVCs (0.05% burden), 8565 PSVCs (0.58%), 23 occurrences of PSVT, longest 34 beats, fastest 162 1 reported event 8/22: HR 75048, average 94. Rare PACs, PVCs, no AF (40hours) 7 events recorded, all autotriggered    HPV (human papilloma virus) infection 1980    Urinary tract infection     Varicella      Past Surgical History:   Procedure Laterality Date    APPENDECTOMY      BREAST BIOPSY      Biopsy Breast Percutaneous Needle Core, 10/18: Right breast mass, needle localized excisional biopsy (A)  Proliferative epithelial changes including radial sclerosing lesions, sclerosing adenosis and usual ductal hyperplasia (please see comment)  Microcalcifications present in proliferative epithelial changes and unremarkable lobules    BREAST BIOPSY  10/2018    sclerosing lesions, sclerosing adenosis and usual ductal hyperplasia 1. Right breast mass, needle localized excisional biopsy (A)  Proliferative epithelial changes including radial sclerosing lesions, sclerosing adenosis and usual ductal hyperplasia. Microcalcifications present in proliferative epithelial changes and unremarkable lobules/Biopsy clip and biopsy site reparative changes identified    BREAST BIOPSY      pt 2:2. Right breast, new medial margin, excision (B)  Fibrofatty breast tissue 3. Left breast, needle localized excisional biopsy (C)  Proliferative epithelial changes including radial sclerosing lesions, sclerosing adenosis and usual ductal hyperplasia (please see comment)  Microcalcifications present in proliferative epithelial changes and unremarkable lobules    BREAST BIOPSY      pt 3: Biopsy clip and biopsy site reparative changes identified 4. Left breast, new medial margin, excision (D)  Proliferative epithelial changes    BREAST LUMPECTOMY      CARDIAC CATHETERIZATION  09/14/2020 9/13: normal POSTOPERATIVE DIAGNOSES: 1.  Normal LEFT MAIN. 2. Normal LAD. 3. Normal CIRCUMFLEX. 4. Normal RIGHT CORONARY ARTERY. 5. Normal left ventricular function, ejection fraction of 70%.    CATARACT EXTRACTION Bilateral     2024    COLONOSCOPY  10/05/2021    1/11: Hemorrhoids; normal (8); diverticulosis, biopsy: (-) 2019: Diverticulosis, polyp:  Ascending colon, polypectomy (C) - Fragments of tubular adenoma.    COLONOSCOPY  2024    divertiuculosis    CONDYLOMA EXCISION/FULGURATION  1980    CYSTOSCOPY  10/05/2021    6/21: Cystoscopy Findings: atrophic vaginitis.  Cystoscopy today reveals a urethral stricture dilated to 20 Welsh with minimal pain. Once inside the bladder there is severe erythema in the trigone. Mild trabeculations. No evidence of stones or tumors. Flow rate 9 cc/s, total volume 172 cc, PVR 61 cc.    ENDOMETRIAL ABLATION      ESOPHAGOGASTRODUODENOSCOPY  10/05/2021    6/21: Cystoscopy Findings: atrophic vaginitis.  Cystoscopy today reveals a urethral stricture dilated to 20 Welsh with minimal pain. Once inside the bladder there is severe erythema in the trigone. Mild trabeculations. No evidence of stones or tumors. Flow rate 9 cc/s, total volume 172 cc, PVR 61 cc.    ESOPHAGOGASTRODUODENOSCOPY  2024    gastritis, healthy gastric bypass    FLEXOR TENDON OF FOREARM / WRIST REPAIR      OTHER SURGICAL HISTORY      Adjacent Tissue Transfer    POLYSOMNOGRAPHY  2023    PSG: Mild SHIMA, O2  90%, AHI 8.4. 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 (no drug eruption, lupus, etc)    TOTAL ABDOMINAL HYSTERECTOMY      Hysterectomy, TAHBSO (endometriosis and fibroids)    TUMOR EXCISION      Excision Of Leg Soft Tissue Tumor     Social History     Social History Narrative    Social History:    Never , No kids    Retired    Nonsmoker    Social ETOH: 1/week    ---    Family History:    M:  Alzheimer's Disease, Fell, broke hip/OP, tremor ( age 90)    F:   'old age' at 92    B:  None                                                          B:  Lipids, HTN                     B: Hearing Loss, HTN    S:  Anemia, dementia onset around 79 yo     S: oral CA, stroke ( age 56)    MUncle:  Colon Cancer    PAunts:Breast Cancer x2 and cousin    Pcousins: Lymphoma       Tobacco/Alcohol/Opioid use, as well as Illicit Drug Use was screened for/reviewed and documented in Social Documentation section of the chart and medication list as appropriate     Depression Screening  Depression screening completed using the PHQ-2 questions, with results documented in the chart/encounter (5 min spent on this).  (See Rooming Screening section for documentation, and/or progress note for additional information)    Cardiac Risk Assessment (15 minutes spent on this)  The 10-year ASCVD risk score (Rojas FREEMAN, et al., 2019) is: 22.5%    Values used to calculate the score:      Age: 79 years      Sex: Female      Is Non- : No      Diabetic: No      Tobacco smoker: No      Systolic Blood Pressure: 129 mmHg      Is BP treated: No      HDL Cholesterol: 62.5 mg/dL      Total Cholesterol: 183 mg/dL    Cardiovascular risk was discussed and, if needed, lifestyle modifications recommended, including nutritional choices, exercise, and elimination of habits contributing to risk. We agreed on a plan to reduce the current cardiovascular risk based on above discussion as needed.     Aspirin use/disuse was discussed and documented in the Problem List of the medical record (under Cardiac Risk Counseling) after reviewing the updated guidelines below:  Consider low dose Aspirin ( mg) use if the benefit for cardiovascular disease prevention outweighs risk for bleeding complications.   Discussed that in general, low dose ASA should be considered:  In patients WITHOUT prior MI/stroke/PAD (primary prevention):   a. Age <60: Use if 10-year cardiovascular disease risk >20%, with  discussion of risks and benefits with patient  b. Age 60-<70: Use if 10-year cardiovascular disease risk >20% and low bleeding (e.g., gastrointestinal) risk, with discussion of risks and benefits with patient  c. Age >=70: Do not use    In patients WITH prior MI/stroke/PAD (secondary prevention):   Generally use unless extremely high bleeding (e.g., gastrointenstinal) risk, with discussion of risks and benefits with patient    Advance Directives Discussion  Advanced Care Planning (including a Living Will, Healthcare POA, as well as specific end of life choices and/or directives), was discussed with the patient and/or surrogate, voluntarily, and details of that discussion documented in the Problem List (under Advanced Directives Discussion) of the medical record.   (16 min spent discussing above)     During the course of the visit the patient was educated and counseled about age appropriate screening and preventive services.   Completed preventive screenings were documented in the chart (see Routine Health Maintenance in Problem List) and orders were placed for outstanding screenings/procedures as documented in the Assessment and Plan.    Patient Instructions (the written plan) was given to the patient at check out as part of the AVS.

## 2024-12-09 ENCOUNTER — PATIENT OUTREACH (OUTPATIENT)
Dept: PRIMARY CARE | Facility: CLINIC | Age: 79
End: 2024-12-09
Payer: COMMERCIAL

## 2024-12-09 DIAGNOSIS — I47.10 PAROXYSMAL SUPRAVENTRICULAR TACHYCARDIA (CMS-HCC): ICD-10-CM

## 2024-12-09 DIAGNOSIS — I48.0 PAROXYSMAL ATRIAL FIBRILLATION (MULTI): ICD-10-CM

## 2024-12-09 NOTE — PROGRESS NOTES
Care Management Monthly Outreach  Last Office Visit: 12/6/24  Next Office Visit: 6/3/25     Health Maintenance Due: up to date    Will be finishing pelvic floor therapy next week  This has helped somewhat  Improved her sleeping- she can now tolerate sleeping on her sides  Suggested Tens unit- referral sent to [facility]  Has decided to purchase through [facility]    Got a standing desk and is using it   She thinks this has also helped    Doing mammogram and ultrasound for lump in Left breast  Will continue to follow up every 6 months    Saw Dr. Barone  Discussed tremors and hallucinations  Decided trying weighted silverware  This does help somewhat    Will be changing to [employer] Plan

## 2024-12-10 DIAGNOSIS — I48.0 PAROXYSMAL ATRIAL FIBRILLATION (MULTI): ICD-10-CM

## 2024-12-10 DIAGNOSIS — I47.10 PAROXYSMAL SUPRAVENTRICULAR TACHYCARDIA (CMS-HCC): ICD-10-CM

## 2024-12-10 NOTE — PROGRESS NOTES
Patient is regularly monitored by PCP and pharmacist for atrial fibrillation with [id number] of 4 and no concerning bleeding symptoms. Requires indefinite anticoagulation. Eliquis renewal for financial assistance submitted for [phone]. Last saw [facility] 12/2/2024.    Allison Wood, PharmD

## 2024-12-11 ENCOUNTER — APPOINTMENT (OUTPATIENT)
Dept: PHYSICAL THERAPY | Facility: CLINIC | Age: 79
End: 2024-12-11
Payer: COMMERCIAL

## 2024-12-11 DIAGNOSIS — M62.89 HIGH-TONE PELVIC FLOOR DYSFUNCTION: Primary | ICD-10-CM

## 2024-12-11 DIAGNOSIS — M53.3 SACROILIAC DYSFUNCTION: ICD-10-CM

## 2024-12-12 ENCOUNTER — APPOINTMENT (OUTPATIENT)
Dept: OBSTETRICS AND GYNECOLOGY | Facility: CLINIC | Age: 79
End: 2024-12-12
Payer: COMMERCIAL

## 2024-12-12 ENCOUNTER — TELEPHONE (OUTPATIENT)
Dept: PRIMARY CARE | Facility: CLINIC | Age: 79
End: 2024-12-12

## 2024-12-12 DIAGNOSIS — N39.41 URGE INCONTINENCE OF URINE: Primary | ICD-10-CM

## 2024-12-12 DIAGNOSIS — M62.89 PELVIC FLOOR DYSFUNCTION: ICD-10-CM

## 2024-12-12 DIAGNOSIS — F41.9 ANXIETY: ICD-10-CM

## 2024-12-12 PROCEDURE — 1123F ACP DISCUSS/DSCN MKR DOCD: CPT | Performed by: OBSTETRICS & GYNECOLOGY

## 2024-12-12 PROCEDURE — 1157F ADVNC CARE PLAN IN RCRD: CPT | Performed by: OBSTETRICS & GYNECOLOGY

## 2024-12-12 PROCEDURE — 99442 PR PHYS/QHP TELEPHONE EVALUATION 11-20 MIN: CPT | Performed by: OBSTETRICS & GYNECOLOGY

## 2024-12-12 RX ORDER — BUSPIRONE HYDROCHLORIDE 5 MG/1
5 TABLET ORAL 2 TIMES DAILY
Qty: 60 TABLET | Refills: 1 | Status: SHIPPED | OUTPATIENT
Start: 2024-12-12 | End: 2025-12-12

## 2024-12-12 NOTE — TELEPHONE ENCOUNTER
Spoke to patient.  Relayed results and recommendations.  Patient verbally understood.    Formatted RX and sent to PCP.

## 2024-12-12 NOTE — PROGRESS NOTES
GYN PROGRESS NOTE    Virtual or Telephone Consent    A telephone visit (audio only) between the patient (at the originating site) and the provider (at the distant site) was utilized to provide this telehealth service.   Verbal consent was requested and obtained from Adelia Munson on this date, 12/12/24 for a telehealth visit.   11 minute visit    Chief complaint: follow up    HPI:  Patient answers are not available for this visit.  - She is overall doing well and has had improvement in pain that she experiences.  - Currently uses a pelvic wand 3 times weekly at home as well as stretching exercises.  - Started using a tens unit at home this week at therapy and the therapist is teaching her how to use the unit where to place the electrodes.   - A couple of times a week she goes to a chair yoga or cardio class.  As well as walking several days a week.  - She is able to sleep on both sides now with less pain and is no longer needs to sit up at night due to pain.  - Noted that when she has a full colon she has more pain.  - She has not started the Trospium due to its side effect profile  - She has at least 3 episodes of nocturia nightly.      Reviewed case with patient, reviewed plans.    Discussed intradetrusor Botox injections as an alternative to oral medications and the risk benefits associated with Botox injections.    I recommend that she continue to go to pelvic floor physical therapy increasing the interval between visits if she would prefer as she will likely have a regression in symptoms if she were to discontinue.    HISTORY:  Past Medical History:   Diagnosis Date    Abnormal MRI, musculoskeletal     MRI hip 6/22: 1. Atrophy of the left gluteus medius and minimus consistent with chronic tendon tears with associated trochanteric bursitis. 2. Mild osteoarthrosis of the left hip joint and small left joint effusion. 3. Mild feathery edema involving the left rectus femoris and adductor longus with adjacent  intermuscular edema suggestive of mild component of muscular injury.    Abnormal MRI, musculoskeletal     pt 2: MRI Tib/Fib 1/2020: Benign appearing area in the proximal tibial metaphysis, without suspicious features, likely postsurgical defect    Abnormal Pap smear of cervix 1990    Abnormal x-ray of extremity     XR Tib/fib 5/7/21: IMPRESSION: Cortical based lucency with irregularity in the posterior cortex of the proximal tibial diaphysis. This may represent a prior fibrous lesions. Comparison with prior studies is advised. Followup in 6 months advised for stability if no priors available    Abnormal x-ray of extremity     pt 2: XR Knee: lytic lesion proximal third tibia, knee compartments normal (MRI done that shows postsurgical lesion) 1/16/21: Benign appearing areas in the proximal tibial metaphysis, without suspicious features, likley postsurgical defect give pts hx    Anemia     Endometriosis 1990    Fibroid 1990    H/O abdominal ultrasound 06/29/2024    US abd soft tissue: No mass or hernia noted    H/O abdominal x-ray series 03/29/2024    nonobstructive bowel gas pattern.  No evidence of pneumoperitoneum.  Visualized portions of the lung are unremarkable.  No acute bony abnormality.  S-shaped curvature of the thoracolumbar spine.  Slight widening of the pubic symphysi    H/O bone density study 12/20/2021    Ten Year Major Osteoporotic Fracture Risk: 22.52% Ten Year Hip Fracture Risk: 13.6% TScore: 2.2 6/18: THE LOWEST TSCORE IS 1.7, FRAX 10/2%    H/O bone density study 03/27/2024    Lowest T score -2.3. FRACTURE RISK (based on FRAX):                       10-year absolute fracture risk:                           - major osteoporotic fracture = 32.1 %                           - hip fracture = 21.7 %    H/O cervical spine x-ray 04/2019    Mild decrease in the intervertebral disc space at the C56 and C6C7. Moderate facet arthropathy with uncovertebral hypertrophy at C4C5 through C6C7    H/O chest x-ray  08/2014    normal 10/22/21: Mild discogenic degenerative changes are seen throughout the thoracic spine. 7/22: normal    H/O CT scan of abdomen     2/13: liver and kidney cysts 7/22: Stomach is decompressed, slightly limiting its evaluation with equivocal mucosal hyperemia and submucosal edema, somewhat suspicious for acute gastritis. There is mild prominence of mucosal folds and subjective mucosal hyperemia in small bowel, with mesenteric edema, suspicious for infectious/inflammatory enteritis.    H/O CT scan of abdomen     pt 2: Colon is mostly decompressed, with small volume of stool in the proximal colon. Mucosal hyperemia in the colon, most pronounced involving the descending and rectosigmoid colon, with mesocolonic and mesorectal fat stranding, suspicious for acute proctocolitis. No bowel dilation. Mild pelvic ascites, probably reactive in etiology. No pneumoperitoneum or loculated intraperitoneal collection.    H/O CT scan of abdomen     pt 3: Small hepatic and renal cortical cysts. Thoracolumbar scoliosis and lumbar spine degenerative changes. Repeat CT 7/21/22: Imp approved hyperemia of small bowel and distal colon. Mild generalized mesenteric edema persists with small volume fluid which    H/O CT scan of abdomen 05/31/2024    Left lower pole renal cyst with nodular high density component, favoring  hemorrhagic cyst, though should be further assessed via renal CT/MRI. 0.6cm lung nodule right base, stable from 2013, benign. Diverticulosis    H/O CT scan of brain 11/2021    There are scattered dural ossifications most prominently along the left frontal lobe where there is a 34 mm thick 15 mm diameter ossification. No associated soft tissue mass to suggest meningioma is noted.    H/O CT scan of chest 08/2020    CT: NO PE    H/O echocardiogram 04/2005    Trivial MR with no mitral valve prolapse. Normal LV fx, neg for ischemia 11/20: 1.left ventricular systolic function normal,60-65% est ejection fraction. 2.  Poorly visualized anatomical struct due to subopt image. 3. Spectral Doppler shows an impaired relaxation pattern of left ventricular diastolic filling. 4. RVSP within normal limits. 5. No significant valvular pathology seen. 6. 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  No suspicious renal mass lesions are  noted in either kidney.    H/O magnetic resonance imaging of brain and brain stem 11/24/2023    Mild white-matter changes are nonspecific but most likely represent small-vessel ischemic disease in a patient of this age and also involve the gonzalo. No evidence of acute ischemic injury or intracranial mass effect.    H/O spine x-ray 06/22/2023    XR Sacrum: Acute angulation at the sacrococcygeal junction which may reflect a prior injury/fracture.    H/O x-ray of lumbar spine 06/22/2023    There is mild anterolisthesis of L3 on L4 and L4 on L5. There is mild multilevel endplate sclerosis and osteophytosis. There is moderate facet disease lower lumbar spine as well    History of colitis     CT 7/22 Stomach is decompressed, slightly limiting its evaluation with equivocal mucosal hyperemia and submucosal edema, somewhat suspicious for acute gastritis. There is mild prominence of mucosal folds and subjective mucosal hyperemia in small bowel, with mesenteric edema, suspicious for infectious/inflammatory enteritis.    History of colitis     pt 2: Colon is mostly decompressed, with small volume of stool in the proximal colon. Mucosal hyperemia in the colon, most pronounced involving the descending and rectosigmoid colon, with m    History of PFTs 11/2022    DLCO substantially reduced, air trapping also    Holter monitor, abnormal 10/2022    Sinus kraen/tachycardia. 11476, average 74. 741 PVCs (0.05% burden), 8565 PSVCs (0.58%), 23 occurrences of PSVT, longest 34 beats, fastest 162 1 reported event  8/22: HR 76369, average 94. Rare PACs, PVCs, no AF (40hours) 7 events recorded, all autotriggered    HPV (human papilloma virus) infection 1980    Urinary tract infection     Varicella      Past Surgical History:   Procedure Laterality Date    APPENDECTOMY      BREAST BIOPSY      Biopsy Breast Percutaneous Needle Core, 10/18: Right breast mass, needle localized excisional biopsy (A)  Proliferative epithelial changes including radial sclerosing lesions, sclerosing adenosis and usual ductal hyperplasia (please see comment)  Microcalcifications present in proliferative epithelial changes and unremarkable lobules    BREAST BIOPSY  10/2018    sclerosing lesions, sclerosing adenosis and usual ductal hyperplasia 1. Right breast mass, needle localized excisional biopsy (A)  Proliferative epithelial changes including radial sclerosing lesions, sclerosing adenosis and usual ductal hyperplasia. Microcalcifications present in proliferative epithelial changes and unremarkable lobules/Biopsy clip and biopsy site reparative changes identified    BREAST BIOPSY      pt 2:2. Right breast, new medial margin, excision (B)  Fibrofatty breast tissue 3. Left breast, needle localized excisional biopsy (C)  Proliferative epithelial changes including radial sclerosing lesions, sclerosing adenosis and usual ductal hyperplasia (please see comment)  Microcalcifications present in proliferative epithelial changes and unremarkable lobules    BREAST BIOPSY      pt 3: Biopsy clip and biopsy site reparative changes identified 4. Left breast, new medial margin, excision (D)  Proliferative epithelial changes    BREAST LUMPECTOMY      CARDIAC CATHETERIZATION  09/14/2020 9/13: normal POSTOPERATIVE DIAGNOSES: 1. Normal LEFT MAIN. 2. Normal LAD. 3. Normal CIRCUMFLEX. 4. Normal RIGHT CORONARY ARTERY. 5. Normal left ventricular function, ejection fraction of 70%.    CATARACT EXTRACTION Bilateral     Feb. 2024    COLONOSCOPY  10/05/2021    1/11:  Hemorrhoids; normal (8); diverticulosis, biopsy: (-) 9/2019: Diverticulosis, polyp:  Ascending colon, polypectomy (C) - Fragments of tubular adenoma.    COLONOSCOPY  03/28/2024    divertiuculosis    CONDYLOMA EXCISION/FULGURATION  1980    CYSTOSCOPY  10/05/2021    6/21: Cystoscopy Findings: atrophic vaginitis.  Cystoscopy today reveals a urethral stricture dilated to 20 Romanian with minimal pain. Once inside the bladder there is severe erythema in the trigone. Mild trabeculations. No evidence of stones or tumors. Flow rate 9 cc/s, total volume 172 cc, PVR 61 cc.    ENDOMETRIAL ABLATION      ESOPHAGOGASTRODUODENOSCOPY  10/05/2021    6/21: Cystoscopy Findings: atrophic vaginitis.  Cystoscopy today reveals a urethral stricture dilated to 20 Romanian with minimal pain. Once inside the bladder there is severe erythema in the trigone. Mild trabeculations. No evidence of stones or tumors. Flow rate 9 cc/s, total volume 172 cc, PVR 61 cc.    ESOPHAGOGASTRODUODENOSCOPY  03/28/2024    gastritis, healthy gastric bypass    FLEXOR TENDON OF FOREARM / WRIST REPAIR      OTHER SURGICAL HISTORY      Adjacent Tissue Transfer    POLYSOMNOGRAPHY  06/14/2023    PSG: Mild SHIMA, O2  90%, AHI 8.4. 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 (no drug eruption, lupus, etc)    TOTAL ABDOMINAL HYSTERECTOMY      Hysterectomy, TAHBSO (endometriosis and fibroids)    TUMOR EXCISION      Excision Of Leg Soft Tissue Tumor     Social History     Socioeconomic History    Marital status: Single     Spouse name: Not on file    Number of children: Not on file    Years of education: Not on file    Highest education level: Not on file   Occupational History    Not on file   Tobacco Use    Smoking status: Never     Passive exposure: Past    Smokeless tobacco: Never   Vaping Use    Vaping status: Never Used   Substance and Sexual Activity    Alcohol use: Yes     Alcohol/week: 2.0 standard drinks of  alcohol     Types: 2 Glasses of wine per week     Comment: Drink occasionally wine/beer with meals/friends.    Drug use: Never    Sexual activity: Not Currently     Partners: Male     Birth control/protection: Post-menopausal, Surgical     Comment: Complete hysterectomy in  when age 45.   Other Topics Concern    Not on file   Social History Narrative    Social History:    Never , No kids    Retired    Nonsmoker    Social ETOH: 1/week    ---    Family History:    M:  Alzheimer's Disease, Fell, broke hip/OP, tremor ( age 90)    F:  'old age' at 92    B:  None                                                          B:  Lipids, HTN                     B: Hearing Loss, HTN    S:  Anemia, dementia onset around 79 yo     S: oral CA, stroke ( age 56)    MUncle:  Colon Cancer    PAunts:Breast Cancer x2 and cousin    Pcousins: Lymphoma     Social Drivers of Health     Financial Resource Strain: Not on file   Food Insecurity: No Food Insecurity (2024)    Hunger Vital Sign     Worried About Running Out of Food in the Last Year: Never true     Ran Out of Food in the Last Year: Never true   Transportation Needs: No Transportation Needs (2024)    PRAPARE - Transportation     Lack of Transportation (Medical): No     Lack of Transportation (Non-Medical): No   Physical Activity: Not on file   Stress: Not on file   Social Connections: Not on file   Intimate Partner Violence: Not on file   Housing Stability: Unknown (2024)    Housing Stability Vital Sign     Unable to Pay for Housing in the Last Year: No     Number of Times Moved in the Last Year: Not on file     Homeless in the Last Year: No     Cancer-related family history includes Breast cancer in her father's sister, father's sister, and paternal cousin; Cancer in her mother's brother; Colon cancer in her mother's brother.       IMPRESSION/PLAN:  79 y.o. pelvic floor dysfunction and urge incontinence.     - Consider intradetrusor Botox  injections.    Follow up in 2 months    Greg FORD am scribing for virtually, and in the presence of Dr. Mac Dietz on  12/12/24 at 5:49 PM     Mac Dietz MD

## 2024-12-12 NOTE — PROGRESS NOTES
Pelvic Floor Physical Therapy Treatment       Patient Name: Adelia Munson  MRN: [id number]  Today's Date: 12/17/2024  Time Calculation  Start Time: 1453  Stop Time: 1531  Time Calculation (min): 38 min    Insurance - reviewed   Visit number: 10 (updated 12/17/24)   Payor: [employer] / Plan: [employer] / Product Type: *No Product type* /    $40 COPAY VISITS ARE MED NEC NO AUTH NEEDED PAYS % OOP 3800.00 1195.00 APPLIED   Referring Provider: Mac Dietz MD       Current Problem  Problem List Items Addressed This Visit             ICD-10-CM       Musculoskeletal and Injuries    High-tone pelvic floor dysfunction - Primary M62.89    RESOLVED: Sacroiliac dysfunction M53.3       Precautions  Fall risk: low- uses device PRN  (+) essential tremors, blood thinners  Denies CA, pacemaker, DM, HTN, latex allergy, epilepsy/seizures, or other known cardio/neuro/pulmonary problems     General  Subjective      Adelia Munson denies any falls or complications since last session. Adelia Munson reports pain is not as bad when it comes on. Has been more consistently sleeping on R side. Continues to report sensitivity wearing underwear or tight pants. Obtained home TENS unit- would like to review this session.     Adelia Munson reports good compliance with HEP.    Pain:  2/10  Pain location: R lower abdomen       Objective  initial eval >> 12/17 re-assessment     LE MMT: L and R  Hip flex: 4+/5 and 4+/5 >> same  Hip ABD: 5/5 and 5/5 >> same  Hip ADD: 4+/5 and 4+/5 >> 5/5  Knee ext: 5/5 and 5/5 >> same  Knee flex: 5/5 and 5/5 >> same  Ankle DF: 5/5 and 5/5 >> same  Ankle PF: 5/5 and 5/5 >> same    Female NIH-CPSI: 25 >> 11     GOALS:   STG (to be achieved in 3 weeks)  Adelia Munson will independently perform HEP as assigned to promote self-management of symptoms and continue making functional gains outside of physical therapy.  10/24/24: GOAL MET     LTG (to be achieved by discharge)  Adelia PHILLIPS Arpit  "will decrease NIH-CPSI raw score by at least 6 points (MCID) to demonstrate improved quality of life. Baseline: 25  10/24/24: PROGRESSING- 21 points  12/17/24: GOAL MET- score of 11 points this visit    Adelia Munson will report 0/10 pain with wearing fitted clothing.  10/24/24: PROGRESSING- reports 3-4/10 pain at end of day with wearing tight clothing  12/17/24: MILD REGRESSION- reports 6/10 pain at end of day with wearing tight clothing     Adelia Munson will be able to unload  with no >2/10 abdominal pain.   10/24/24: PROGRESSING- reports 4/10 pain with unloading   12/17/24: PROGRESSING- reports 3/10 pain with unloading      Adelia Munson will be able to lay on R side with at least 75% reduction in symptoms to assist with sleeping.   10/24/24: PROGRESSING- reports 40% improvement in pain while sleeping on R side  12/17/24: PROGRESSING- reports 60% improvement in pain with sleeping on R side    Treatments:  Abbreviation Key  NC = not completed this visit   NV = next visit  // = parallel      Therapeutic Activity: 38 minutes  Re-assessment: see subjective, objective, assessment, and updated goals   Set-up, trial, and education on home TENS unit  Pad placement celena medial to ASIS only  Patient's unit has larger pads vs PT unit causing discomfort with attempt of 4 electrode pads  Select program \"P3\"  Unit does not have capacity to create custom parameters  Selected program for low back  Intensity of 2-3  Used for 10 mins only within clinic, instructed for 20 mins at home PRN  Plan to reduce PT frequency to 1x month for indep maintenace  Re-educated patient on goal of giving her tools to indep manage symptoms        Outpatient Education: Reviewed home exercise program.   Adelia Munson verbalizes understanding of all education provided: Yes    Assessment:  Re-assessment performed today after Adelia Munson has attended 10 for abdominal (RLQ) and pelvic pain. Treatments have " primarily consisted of: TherEx, TherAct, Neuromuscular Re-Education, and Manual Therapy. Adelia Munson has demonstrated improvements in pain and hip ADD strength since previous re-assessment. Functionally, she reports reduced discomfort with unloading  and sleeping on R side. Impairments in pain, R lower abdominal tension, and strength remain at this time limiting ability to wear tight clothing. Good progress made towards established goals. Skilled physical therapy services continue to be appropriate and beneficial at reduced frequency of 1x month for 3 months to advance functional status and attain therapy-related goals.         Plan:  Assess response to home TENS and stretching. DN?           Time Calculation  Start Time: 1453  Stop Time: 1531  Time Calculation (min): 38 min     PT Therapeutic Procedures Time Entry  Therapeutic Activity Time Entry: 38

## 2024-12-17 ENCOUNTER — TREATMENT (OUTPATIENT)
Dept: PHYSICAL THERAPY | Facility: CLINIC | Age: 79
End: 2024-12-17
Payer: COMMERCIAL

## 2024-12-17 DIAGNOSIS — M62.89 HIGH-TONE PELVIC FLOOR DYSFUNCTION: Primary | ICD-10-CM

## 2024-12-17 DIAGNOSIS — M53.3 SACROILIAC DYSFUNCTION: ICD-10-CM

## 2024-12-17 PROCEDURE — 97530 THERAPEUTIC ACTIVITIES: CPT | Mod: GP

## 2024-12-19 ENCOUNTER — TELEPHONE (OUTPATIENT)
Dept: PHARMACY | Facility: HOSPITAL | Age: 79
End: 2024-12-19
Payer: COMMERCIAL

## 2024-12-19 NOTE — TELEPHONE ENCOUNTER
Patient previously got one month of Eliquis at a time through  PAP. Called to question last fill was covered under program still as it was a 3-month supply. Verified this did go through as intended and there was no charge to the patient and communicated her next scheduled fill is for 3/6/2025. Patient verbalizes understanding and had no further questions.    Allison Wood, PharmD

## 2024-12-23 ENCOUNTER — DOCUMENTATION (OUTPATIENT)
Dept: PHYSICAL THERAPY | Facility: CLINIC | Age: 79
End: 2024-12-23
Payer: COMMERCIAL

## 2024-12-23 DIAGNOSIS — M62.89 HIGH-TONE PELVIC FLOOR DYSFUNCTION: Primary | ICD-10-CM

## 2024-12-23 NOTE — PROGRESS NOTES
Physical Therapy                 Therapy Communication Note    Patient Name: Adelia Munson  MRN: [id number]  Today's Date: 12/23/2024     Discipline: Physical Therapy    Comment: Therapist returned patient's call per request. Answered questions about TENS device: use up arrow to increase intensity & recommended 20 min use PRN

## 2025-01-07 DIAGNOSIS — J43.8 OTHER EMPHYSEMA (MULTI): ICD-10-CM

## 2025-01-08 ENCOUNTER — APPOINTMENT (OUTPATIENT)
Dept: PHYSICAL THERAPY | Facility: CLINIC | Age: 80
End: 2025-01-08
Payer: MEDICARE

## 2025-01-08 DIAGNOSIS — M62.89 HIGH-TONE PELVIC FLOOR DYSFUNCTION: Primary | ICD-10-CM

## 2025-01-13 NOTE — PROGRESS NOTES
Pelvic Floor Physical Therapy Treatment       Patient Name: Adelia Munson  MRN: [id number]  Today's Date: 1/15/2025       Insurance - reviewed   Visit number: 11 (updated 01/13/25)   Payor: [employer] / Plan: [employer] / Product Type: *No Product type* /    $40 COPAY VISITS ARE MED NEC NO AUTH NEEDED PAYS % OOP 3800.00 1195.00 APPLIED   Referring Provider: Mac Dietz MD       Current Problem  Problem List Items Addressed This Visit             ICD-10-CM       Musculoskeletal and Injuries    High-tone pelvic floor dysfunction - Primary M62.89       Precautions  Fall risk: low- uses device PRN  (+) essential tremors, blood thinners  Denies CA, pacemaker, DM, HTN, latex allergy, epilepsy/seizures, or other known cardio/neuro/pulmonary problems     General  Subjective      Adelia Munson denies any falls or complications since last session. Adelia Munson reports ***  Adelia Munson reports good compliance with HEP.    Pain:  ***/10  Pain location: R lower abdomen       Objective     Treatments:  Abbreviation Key  NC = not completed this visit   NV = next visit  // = parallel      ***      Outpatient Education: Reviewed home exercise program.   Adelia Munson verbalizes understanding of all education provided: Yes    Assessment:  Adelia Munson completed treatment today which focused upon *** in order to address ongoing R lower abdominal pain.  Adelia presents with ***.  Adelia requires ***.  Adelia was challenged with  ***.  Adelia's  response to tx was: {amcpfresponsetointerventions:74930}. Adelia's Progress towards goals: {amcpfprogresstowardsfunctionalgoals:94744}. Adelia Munson continues to have pain and soft tissue dysfunction limiting full participation in household chores and sleeping. Further skilled therapy services are warranted to address the remaining impairments in order to progress towards functional goals. Adelia left session with all questions answered and no  increase in symptoms.          Plan: ***  Assess response to home TENS and stretching. DN?

## 2025-01-15 ENCOUNTER — APPOINTMENT (OUTPATIENT)
Dept: PHYSICAL THERAPY | Facility: CLINIC | Age: 80
End: 2025-01-15
Payer: MEDICARE

## 2025-02-04 NOTE — PROGRESS NOTES
Pelvic Floor Physical Therapy Treatment       Patient Name: Adelia Munson  MRN: [id number]  Today's Date: 2/5/2025  Time Calculation  Start Time: 1035  Stop Time: 1120  Time Calculation (min): 45 min    Insurance - reviewed   Visit number: 11 (updated 02/05/25)   Payor: [employer] [facility] MEDICARE / Plan: [facility] MEDICARE / Product Type: *No Product type* /    2025 MMO- $30 COPAY VISITS ARE MED NEC NO AUTH NEEDED PAYS % OOP 3300.00 0 APPLIED   Referring Provider: Mac Dietz MD       Current Problem  Problem List Items Addressed This Visit             ICD-10-CM       Musculoskeletal and Injuries    High-tone pelvic floor dysfunction - Primary M62.89     Other Visit Diagnoses         Codes    Sacroiliac dysfunction     M53.3            Precautions  Fall risk: low- uses device PRN  (+) essential tremors, blood thinners  Denies CA, pacemaker, DM, HTN, latex allergy, epilepsy/seizures, or other known cardio/neuro/pulmonary problems     General  Subjective      Adelia Munson denies any falls or complications since last session. Adelia Munson returns to clinic after not being seen since 12/17- has continued HEP and TENS indep with significant reduction in symptoms. Rates average pain as 4-5/10 over the past week- feels like she has reached a plateau in progress. Continues to experience pain with wearing tight fitted clothing and sitting for extended periods of time. Uses pelvic wand 1x week- not sure it is helping. Requested to use internal sensor again. Also would like therapist to assess red bump present on vulva.     Adelia Munson reports good compliance with HEP.    Pain:  1/10  Pain location: R lower abdomen       Objective   Pelvic floor resting tone: 2.1 microvolts    Treatments:  Abbreviation Key  NC = not completed this visit   NV = next visit  // = parallel      Therapeutic Activity: 15 minutes  Review of current symptoms since last seen at this clinic  Answered questions  about DN- patient interested in performing next session  Education on trigger points: decreased presence with consistent use of wand is a sign of improvement  Neuromuscular Re-education: 25 minutes   Performed with informed patient consent  Pre-ESTIM: baseline assessment for avg resting tone (see objective)  ESTIM for pelvic floor down-training  Prometheus rectal sensor inserted vaginally for comfort. Used with pt in hooklying position  Selected parameters: 12.5 Hz, 5 sec on: 5 sec off 15 min  Intensity 27      Outpatient Education: Reviewed home exercise program.   Adelia Munson verbalizes understanding of all education provided: Yes    Assessment:  Adelia Munson completed treatment today which focused upon pelvic floor down training in order to address ongoing R lower abdominal pain.  Adelia presents with normal resting tone of pelvic floor musculature and no pain with insertion of sensor (good progress since initial eval). Patient notes decreased trigger points with consistent performance of pelvic wand. Continued with E-STIM vaginally due to reported benefit from previous trial- will assess response next session. Red bump present around vulva appears to be an ingrown hair- instructed patient to reach out to physician if concerned. Therapist answered questions about DN- patient interested next session due to reported plateau in progress. Adelia's  response to tx was: Patient tolerated interventions well without any adverse events and no increased symptoms. Adelia's Progress towards goals: Reduced pain level. Adelia Munson continues to have pain and soft tissue dysfunction limiting full participation in household chores and sleeping. Further skilled therapy services are warranted to address the remaining impairments in order to progress towards functional goals. Adelia left session with all questions answered and no increase in symptoms.        Plan:   DN? Core activation and hip strengthening           Time  Calculation  Start Time: 1035  Stop Time: 1120  Time Calculation (min): 45 min     PT Therapeutic Procedures Time Entry  Neuromuscular Re-Education Time Entry: 25  Therapeutic Activity Time Entry: 15              Non-Billable Time  Non-billable time: 5  Non-billable time reason: Patient changing clothes and troubling shooting Prometheus machine

## 2025-02-05 ENCOUNTER — TREATMENT (OUTPATIENT)
Dept: PHYSICAL THERAPY | Facility: CLINIC | Age: 80
End: 2025-02-05
Payer: MEDICARE

## 2025-02-05 DIAGNOSIS — M53.3 SACROILIAC DYSFUNCTION: ICD-10-CM

## 2025-02-05 DIAGNOSIS — M62.89 HIGH-TONE PELVIC FLOOR DYSFUNCTION: Primary | ICD-10-CM

## 2025-02-05 PROCEDURE — 97530 THERAPEUTIC ACTIVITIES: CPT | Mod: GP

## 2025-02-05 PROCEDURE — 97112 NEUROMUSCULAR REEDUCATION: CPT | Mod: GP

## 2025-02-06 ENCOUNTER — PATIENT OUTREACH (OUTPATIENT)
Dept: PRIMARY CARE | Facility: CLINIC | Age: 80
End: 2025-02-06
Payer: COMMERCIAL

## 2025-02-06 DIAGNOSIS — K21.9 CHRONIC GERD: ICD-10-CM

## 2025-02-06 DIAGNOSIS — I48.0 PAROXYSMAL ATRIAL FIBRILLATION (MULTI): ICD-10-CM

## 2025-02-06 NOTE — PROGRESS NOTES
Care Management Monthly Outreach  Last Office Visit: 12/6/24  Next Office Visit: 6/2/25     Health Maintenance Due: up to date  Chart Review Completed    Adelia is doing well  Continues exercise at home  Pelvic floor PT has been very helpful    Discussed graduating Adelia from Chronic care management program  She is agreeable  She is aware she can re-enroll at any time

## 2025-02-12 ENCOUNTER — APPOINTMENT (OUTPATIENT)
Dept: OBSTETRICS AND GYNECOLOGY | Facility: CLINIC | Age: 80
End: 2025-02-12
Payer: COMMERCIAL

## 2025-02-12 DIAGNOSIS — M62.89 PELVIC FLOOR DYSFUNCTION: Primary | ICD-10-CM

## 2025-02-13 NOTE — PROGRESS NOTES
GYN PROGRESS NOTE    Virtual or Telephone Consent    A telephone visit (audio only) between the patient (at the originating site) and the provider (at the distant site) was utilized to provide this telehealth service.   Verbal consent was requested and obtained from Adelia Munson on this date, 02/12/25 for a telehealth visit.   11 minute visit    Chief complaint: follow up    HPI:  Patient answers are not available for this visit.  - Patient reports that she has attended eleven physical therapy sessions.  She has 2 more physical therapy appointments scheduled for March and April.  - She uses a Tens unit three times a week and reports it is helpful and well as an internal device which has been beneficial as well.  - She has used a wand at home which she did not see any improvement.  - Reports a significant improvement in pain she is now able to bend over, sit longer, and sleep on her side.  - She has started participating in chair yoga and aerobic exercises, which incorporate stretches learned in physical therapy.  - The patient has acquired an adjustable desk to reduce sitting time and engage in activities like puzzles and computer work.  - Patient has noticed improvement in nocturia if she focuses her water consumption during the day and restricts water after 5 pm and when at home she tries to adhere to a bathroom schedule.  - Noted that she take Eliquis daily.  - Patient is not ready to proceed with intradetrusor Botox injections yet.      Reviewed case with patient, reviewed plans.    Discussed the risk benefits of intradetrusor Botox injections which I recommend as it should give her improvement in her bladder symptoms.    Reviewed the intradetrusor Botox procedure which would be done around every 6 months.    HISTORY:  Past Medical History:   Diagnosis Date    Abnormal MRI, musculoskeletal     MRI hip 6/22: 1. Atrophy of the left gluteus medius and minimus consistent with chronic tendon tears with associated  trochanteric bursitis. 2. Mild osteoarthrosis of the left hip joint and small left joint effusion. 3. Mild feathery edema involving the left rectus femoris and adductor longus with adjacent intermuscular edema suggestive of mild component of muscular injury.    Abnormal MRI, musculoskeletal     pt 2: MRI Tib/Fib 1/2020: Benign appearing area in the proximal tibial metaphysis, without suspicious features, likely postsurgical defect    Abnormal Pap smear of cervix 1990    Abnormal x-ray of extremity     XR Tib/fib 5/7/21: IMPRESSION: Cortical based lucency with irregularity in the posterior cortex of the proximal tibial diaphysis. This may represent a prior fibrous lesions. Comparison with prior studies is advised. Followup in 6 months advised for stability if no priors available    Abnormal x-ray of extremity     pt 2: XR Knee: lytic lesion proximal third tibia, knee compartments normal (MRI done that shows postsurgical lesion) 1/16/21: Benign appearing areas in the proximal tibial metaphysis, without suspicious features, likley postsurgical defect give pts hx    Anemia     Endometriosis 1990    Fibroid 1990    H/O abdominal ultrasound 06/29/2024    US abd soft tissue: No mass or hernia noted    H/O abdominal x-ray series 03/29/2024    nonobstructive bowel gas pattern.  No evidence of pneumoperitoneum.  Visualized portions of the lung are unremarkable.  No acute bony abnormality.  S-shaped curvature of the thoracolumbar spine.  Slight widening of the pubic symphysi    H/O bone density study 12/20/2021    Ten Year Major Osteoporotic Fracture Risk: 22.52% Ten Year Hip Fracture Risk: 13.6% TScore: 2.2 6/18: THE LOWEST TSCORE IS 1.7, FRAX 10/2%    H/O bone density study 03/27/2024    Lowest T score -2.3. FRACTURE RISK (based on FRAX):                       10-year absolute fracture risk:                           - major osteoporotic fracture = 32.1 %                           - hip fracture = 21.7 %    H/O cervical  spine x-ray 04/2019    Mild decrease in the intervertebral disc space at the C56 and C6C7. Moderate facet arthropathy with uncovertebral hypertrophy at C4C5 through C6C7    H/O chest x-ray 08/2014    normal 10/22/21: Mild discogenic degenerative changes are seen throughout the thoracic spine. 7/22: normal    H/O CT scan of abdomen     2/13: liver and kidney cysts 7/22: Stomach is decompressed, slightly limiting its evaluation with equivocal mucosal hyperemia and submucosal edema, somewhat suspicious for acute gastritis. There is mild prominence of mucosal folds and subjective mucosal hyperemia in small bowel, with mesenteric edema, suspicious for infectious/inflammatory enteritis.    H/O CT scan of abdomen     pt 2: Colon is mostly decompressed, with small volume of stool in the proximal colon. Mucosal hyperemia in the colon, most pronounced involving the descending and rectosigmoid colon, with mesocolonic and mesorectal fat stranding, suspicious for acute proctocolitis. No bowel dilation. Mild pelvic ascites, probably reactive in etiology. No pneumoperitoneum or loculated intraperitoneal collection.    H/O CT scan of abdomen     pt 3: Small hepatic and renal cortical cysts. Thoracolumbar scoliosis and lumbar spine degenerative changes. Repeat CT 7/21/22: Imp approved hyperemia of small bowel and distal colon. Mild generalized mesenteric edema persists with small volume fluid which    H/O CT scan of abdomen 05/31/2024    Left lower pole renal cyst with nodular high density component, favoring  hemorrhagic cyst, though should be further assessed via renal CT/MRI. 0.6cm lung nodule right base, stable from 2013, benign. Diverticulosis    H/O CT scan of brain 11/2021    There are scattered dural ossifications most prominently along the left frontal lobe where there is a 34 mm thick 15 mm diameter ossification. No associated soft tissue mass to suggest meningioma is noted.    H/O CT scan of chest 08/2020    CT: NO PE     H/O echocardiogram 04/2005    Trivial MR with no mitral valve prolapse. Normal LV fx, neg for ischemia 11/20: 1.left ventricular systolic function normal,60-65% est ejection fraction. 2. Poorly visualized anatomical struct due to subopt image. 3. Spectral Doppler shows an impaired relaxation pattern of left ventricular diastolic filling. 4. RVSP within normal limits. 5. No significant valvular pathology seen. 6. 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  No suspicious renal mass lesions are  noted in either kidney.    H/O magnetic resonance imaging of brain and brain stem 11/24/2023    Mild white-matter changes are nonspecific but most likely represent small-vessel ischemic disease in a patient of this age and also involve the gonzalo. No evidence of acute ischemic injury or intracranial mass effect.    H/O spine x-ray 06/22/2023    XR Sacrum: Acute angulation at the sacrococcygeal junction which may reflect a prior injury/fracture.    H/O x-ray of lumbar spine 06/22/2023    There is mild anterolisthesis of L3 on L4 and L4 on L5. There is mild multilevel endplate sclerosis and osteophytosis. There is moderate facet disease lower lumbar spine as well    History of colitis     CT 7/22 Stomach is decompressed, slightly limiting its evaluation with equivocal mucosal hyperemia and submucosal edema, somewhat suspicious for acute gastritis. There is mild prominence of mucosal folds and subjective mucosal hyperemia in small bowel, with mesenteric edema, suspicious for infectious/inflammatory enteritis.    History of colitis     pt 2: Colon is mostly decompressed, with small volume of stool in the proximal colon. Mucosal hyperemia in the colon, most pronounced involving the descending and rectosigmoid colon, with m    History of PFTs 11/2022    DLCO substantially reduced, air trapping also    Holter  monitor, abnormal 10/2022    Sinus karen/tachycardia. 82746, average 74. 741 PVCs (0.05% burden), 8565 PSVCs (0.58%), 23 occurrences of PSVT, longest 34 beats, fastest 162 1 reported event 8/22: HR 10702, average 94. Rare PACs, PVCs, no AF (40hours) 7 events recorded, all autotriggered    HPV (human papilloma virus) infection 1980    Urinary tract infection     Varicella      Past Surgical History:   Procedure Laterality Date    APPENDECTOMY      BREAST BIOPSY      Biopsy Breast Percutaneous Needle Core, 10/18: Right breast mass, needle localized excisional biopsy (A)  Proliferative epithelial changes including radial sclerosing lesions, sclerosing adenosis and usual ductal hyperplasia (please see comment)  Microcalcifications present in proliferative epithelial changes and unremarkable lobules    BREAST BIOPSY  10/2018    sclerosing lesions, sclerosing adenosis and usual ductal hyperplasia 1. Right breast mass, needle localized excisional biopsy (A)  Proliferative epithelial changes including radial sclerosing lesions, sclerosing adenosis and usual ductal hyperplasia. Microcalcifications present in proliferative epithelial changes and unremarkable lobules/Biopsy clip and biopsy site reparative changes identified    BREAST BIOPSY      pt 2:2. Right breast, new medial margin, excision (B)  Fibrofatty breast tissue 3. Left breast, needle localized excisional biopsy (C)  Proliferative epithelial changes including radial sclerosing lesions, sclerosing adenosis and usual ductal hyperplasia (please see comment)  Microcalcifications present in proliferative epithelial changes and unremarkable lobules    BREAST BIOPSY      pt 3: Biopsy clip and biopsy site reparative changes identified 4. Left breast, new medial margin, excision (D)  Proliferative epithelial changes    BREAST LUMPECTOMY      CARDIAC CATHETERIZATION  09/14/2020 9/13: normal POSTOPERATIVE DIAGNOSES: 1. Normal LEFT MAIN. 2. Normal LAD. 3. Normal CIRCUMFLEX.  4. Normal RIGHT CORONARY ARTERY. 5. Normal left ventricular function, ejection fraction of 70%.    CATARACT EXTRACTION Bilateral     Feb. 2024    COLONOSCOPY  10/05/2021    1/11: Hemorrhoids; normal (8); diverticulosis, biopsy: (-) 9/2019: Diverticulosis, polyp:  Ascending colon, polypectomy (C) - Fragments of tubular adenoma.    COLONOSCOPY  03/28/2024    divertiuculosis    CONDYLOMA EXCISION/FULGURATION  1980    CYSTOSCOPY  10/05/2021    6/21: Cystoscopy Findings: atrophic vaginitis.  Cystoscopy today reveals a urethral stricture dilated to 20 [place] with minimal pain. Once inside the bladder there is severe erythema in the trigone. Mild trabeculations. No evidence of stones or tumors. Flow rate 9 cc/s, total volume 172 cc, PVR 61 cc.    ENDOMETRIAL ABLATION      ESOPHAGOGASTRODUODENOSCOPY  10/05/2021    6/21: Cystoscopy Findings: atrophic vaginitis.  Cystoscopy today reveals a urethral stricture dilated to 20 [place]ian with minimal pain. Once inside the bladder there is severe erythema in the trigone. Mild trabeculations. No evidence of stones or tumors. Flow rate 9 cc/s, total volume 172 cc, PVR 61 cc.    ESOPHAGOGASTRODUODENOSCOPY  03/28/2024    gastritis, healthy gastric bypass    FLEXOR TENDON OF FOREARM / WRIST REPAIR      OTHER SURGICAL HISTORY      Adjacent Tissue Transfer    POLYSOMNOGRAPHY  06/14/2023    PSG: Mild SHIMA, O2  90%, AHI 8.4. 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 (no drug eruption, lupus, etc)    TOTAL ABDOMINAL HYSTERECTOMY      Hysterectomy, TAHBSO (endometriosis and fibroids)    TUMOR EXCISION      Excision Of Leg Soft Tissue Tumor     Social History     Socioeconomic History    Marital status: Single     Spouse name: Not on file    Number of children: Not on file    Years of education: Not on file    Highest education level: Not on file   Occupational History    Not on file   Tobacco Use    Smoking status: Never      Passive exposure: Past    Smokeless tobacco: Never   Vaping Use    Vaping status: Never Used   Substance and Sexual Activity    Alcohol use: Yes     Alcohol/week: 2.0 standard drinks of alcohol     Types: 2 Glasses of wine per week     Comment: Drink occasionally wine/beer with meals/friends.    Drug use: Never    Sexual activity: Not Currently     Partners: Male     Birth control/protection: Post-menopausal, Surgical     Comment: Complete hysterectomy in  when age 45.   Other Topics Concern    Not on file   Social History Narrative    Social History:    Never , No kids    Retired    Nonsmoker    Social ETOH: 1/week    ---    Family History:    M:  Alzheimer's Disease, Fell, broke hip/OP, tremor ( age 90)    F:  'old age' at 92    B:  None                                                          B:  Lipids, HTN                     B: Hearing Loss, HTN    S:  Anemia, dementia onset around 79 yo     S: oral CA, stroke ( age 56)    MUncle:  Colon Cancer    PAunts:Breast Cancer x2 and cousin    Pcousins: Lymphoma     Social Drivers of Health     Financial Resource Strain: Not on file   Food Insecurity: No Food Insecurity (2024)    Hunger Vital Sign     Worried About Running Out of Food in the Last Year: Never true     Ran Out of Food in the Last Year: Never true   Transportation Needs: No Transportation Needs (2024)    PRAPARE - Transportation     Lack of Transportation (Medical): No     Lack of Transportation (Non-Medical): No   Physical Activity: Not on file   Stress: Not on file   Social Connections: Not on file   Intimate Partner Violence: Not on file   Housing Stability: Unknown (2024)    Housing Stability Vital Sign     Unable to Pay for Housing in the Last Year: No     Number of Times Moved in the Last Year: Not on file     Homeless in the Last Year: No     Cancer-related family history includes Breast cancer in her father's sister, father's sister, and paternal cousin;  Cancer in her mother's brother; Colon cancer in her mother's brother.       IMPRESSION/PLAN:  79 y.o. pelvic floor dysfunction and urge incontinence.    - Consider intradetrusor Botox    Follow up in 2 months    Greg FORD am scribing for virtually, and in the presence of Dr. Mac Dietz on  02/12/25 at 8:13 PM     Mac Dietz MD

## 2025-02-27 PROCEDURE — RXMED WILLOW AMBULATORY MEDICATION CHARGE

## 2025-02-28 ENCOUNTER — PHARMACY VISIT (OUTPATIENT)
Dept: PHARMACY | Facility: CLINIC | Age: 80
End: 2025-02-28
Payer: COMMERCIAL

## 2025-03-03 ENCOUNTER — DOCUMENTATION (OUTPATIENT)
Dept: PRIMARY CARE | Facility: CLINIC | Age: 80
End: 2025-03-03
Payer: MEDICARE

## 2025-03-03 NOTE — PROGRESS NOTES
Patient graduated at this time from Chronic Care Management Program.  Aware she can re-enroll at anytime.  My contact information has been provided.

## 2025-03-05 ENCOUNTER — TREATMENT (OUTPATIENT)
Dept: PHYSICAL THERAPY | Facility: CLINIC | Age: 80
End: 2025-03-05
Payer: MEDICARE

## 2025-03-05 DIAGNOSIS — M62.89 HIGH-TONE PELVIC FLOOR DYSFUNCTION: Primary | ICD-10-CM

## 2025-03-05 DIAGNOSIS — M53.3 SACROILIAC DYSFUNCTION: ICD-10-CM

## 2025-03-05 PROCEDURE — 97530 THERAPEUTIC ACTIVITIES: CPT | Mod: GP

## 2025-03-05 PROCEDURE — 97110 THERAPEUTIC EXERCISES: CPT | Mod: GP

## 2025-03-05 NOTE — PROGRESS NOTES
Pelvic Floor Physical Therapy Treatment       Patient Name: Adelia Munson  MRN: [id number]  Today's Date: 3/5/2025  Time Calculation  Start Time: 1035  Stop Time: 1115  Time Calculation (min): 40 min    Insurance - reviewed   Visit number: 12 (updated 03/05/25)   Payor: [facility] MEDICARE / Plan: [facility] MEDICARE / Product Type: *No Product type* /    2025 MMO- $30 COPAY VISITS ARE MED NEC NO AUTH NEEDED PAYS % OOP 3300.00 0 APPLIED   Referring Provider: Mac Dietz MD       Current Problem  Problem List Items Addressed This Visit             ICD-10-CM       Musculoskeletal and Injuries    High-tone pelvic floor dysfunction - Primary M62.89     Other Visit Diagnoses         Codes    Sacroiliac dysfunction     M53.3            Precautions  Fall risk: low- uses device PRN  (+) essential tremors, blood thinners  Denies CA, pacemaker, DM, HTN, latex allergy, epilepsy/seizures, or other known cardio/neuro/pulmonary problems     General  Subjective      Adelia Munson denies any falls or complications since last session. Adelia Munson continues to report plateau in progress. Symptoms still present with sleeping on R side and wearing tight clothing but is overall more manageable. Rates average pain as 4/10 when present. Still considering DN as a treatment option.    Adelia Munson reports good compliance with HEP.    Pain:  0/10  Pain location: R lower abdomen       Objective       Treatments:  Abbreviation Key  NC = not completed this visit   NV = next visit  // = parallel      Therapeutic Activity: 10 minutes  Hydration goals  You should be drinking ½ of your body weight in ounces of fluid per day  Personalized for you, that is 78 ounces of fluid per day  At least 52 ounces should be water  Make sure you are gradually sipping water throughout the day  Do NOT chug water  Monitor the color of your urine  Goal is yellow to light yellow  Education on nocturia: goal is 0-1x per  night  Instruction to limit fluids 2 hours before bedtime    Therapeutic Exercise:  30 minutes    Access Code: [email or web address]  Supine Dead Bug with Leg Extension, 3x10  Supine Bridge, 3x10 with 5 sec hold  Sidelying Hip Abduction, 2x10 each side  Seated hip ADD ball squeeze, 3x10 with 5 sec hold  Standing R hip flex stretch with overhead reach, 3x30 sec       Outpatient Education: Reviewed home exercise program.   Adelia Munson verbalizes understanding of all education provided: Yes    Assessment:  Adelia Munson completed treatment today which focused upon core and hip strengthening in order to address ongoing R lower abdominal pain.  Adelia presents with mild glute max weakness- challenged with bridges, reduced glute clearance with increased reps. Demonstrates good hip stability in frontal plane during sidelying hip ADD. Dysfunctional TA bracing- able to correct with cueing during dead bugs. Adelia's  response to tx was: Patient tolerated interventions well without any increased pain. Adelia's Progress towards goals: Reduced frequency and intensity of pain. Adelia Munson continues to have pain and soft tissue dysfunction limiting full participation in household chores and sleeping. Further skilled therapy services are warranted to address the remaining impairments in order to progress towards functional goals. Adelia left session with all questions answered and no increase in symptoms.        Plan:   Re-assessment next visit          Time Calculation  Start Time: 1035  Stop Time: 1115  Time Calculation (min): 40 min     PT Therapeutic Procedures Time Entry  Therapeutic Exercise Time Entry: 30  Therapeutic Activity Time Entry: 10

## 2025-03-07 ENCOUNTER — TELEPHONE (OUTPATIENT)
Dept: PRIMARY CARE | Facility: CLINIC | Age: 80
End: 2025-03-07
Payer: MEDICARE

## 2025-03-07 ENCOUNTER — PATIENT MESSAGE (OUTPATIENT)
Dept: PRIMARY CARE | Facility: CLINIC | Age: 80
End: 2025-03-07
Payer: MEDICARE

## 2025-03-07 NOTE — TELEPHONE ENCOUNTER
Spoke to patient.  Mailed copy of [employer] to her home and it was scanned to her through My Chart.  Relayed to reach out to [employer] and [employer] regarding bill statement.  Patient verbally understood.

## 2025-03-07 NOTE — PATIENT COMMUNICATION
Spoke with patient.  Relayed to reach out to [employer] as well as [employer] to try and get more information.  Copy of [employer] results were mailed as well as sent to her in a My Chart message.  Patient verbally understood.

## 2025-04-02 ENCOUNTER — APPOINTMENT (OUTPATIENT)
Dept: PHYSICAL THERAPY | Facility: CLINIC | Age: 80
End: 2025-04-02
Payer: MEDICARE

## 2025-04-02 DIAGNOSIS — M62.89 HIGH-TONE PELVIC FLOOR DYSFUNCTION: Primary | ICD-10-CM

## 2025-04-03 ENCOUNTER — APPOINTMENT (OUTPATIENT)
Dept: PHYSICAL THERAPY | Facility: CLINIC | Age: 80
End: 2025-04-03
Payer: MEDICARE

## 2025-04-03 DIAGNOSIS — M62.89 HIGH-TONE PELVIC FLOOR DYSFUNCTION: Primary | ICD-10-CM

## 2025-04-07 ENCOUNTER — APPOINTMENT (OUTPATIENT)
Dept: PHYSICAL THERAPY | Facility: CLINIC | Age: 80
End: 2025-04-07
Payer: MEDICARE

## 2025-04-07 DIAGNOSIS — M62.89 HIGH-TONE PELVIC FLOOR DYSFUNCTION: Primary | ICD-10-CM

## 2025-04-27 ASSESSMENT — ENCOUNTER SYMPTOMS
FEVER: 0
CONSTIPATION: 0
HEMATOCHEZIA: 0
DYSURIA: 0
MYALGIAS: 0
HEMATURIA: 0
HEADACHES: 0
DIARRHEA: 0
FREQUENCY: 1
ARTHRALGIAS: 0
BELCHING: 0
NAUSEA: 0
VOMITING: 0
ABDOMINAL PAIN: 1
ANOREXIA: 0
FLATUS: 0
WEIGHT LOSS: 0

## 2025-04-28 ENCOUNTER — APPOINTMENT (OUTPATIENT)
Dept: OBSTETRICS AND GYNECOLOGY | Facility: CLINIC | Age: 80
End: 2025-04-28
Payer: MEDICARE

## 2025-04-28 DIAGNOSIS — M79.18 MYALGIA OF PELVIC FLOOR: Primary | ICD-10-CM

## 2025-04-28 PROCEDURE — 1157F ADVNC CARE PLAN IN RCRD: CPT | Performed by: OBSTETRICS & GYNECOLOGY

## 2025-04-28 PROCEDURE — 1123F ACP DISCUSS/DSCN MKR DOCD: CPT | Performed by: OBSTETRICS & GYNECOLOGY

## 2025-04-28 PROCEDURE — 98013 SYNCH AUDIO-ONLY EST LOW 20: CPT | Performed by: OBSTETRICS & GYNECOLOGY

## 2025-04-29 NOTE — PROGRESS NOTES
GYN PROGRESS NOTE    Virtual or Telephone Consent    While technically available, the patient was unable or unwilling to consent to connect via audio/video telehealth technology; therefore, I performed this visit using a real-time audio only connection between Adelia Munson & Mac Dietz MD.  Verbal consent was requested and obtained from Adelia Munson on this date, 04/28/25 for a telehealth visit and the patient's location was confirmed at the time of the visit.  20 minute visit    Chief complaint: follow up    HPI:  Answers submitted by the patient for this visit:  Abdominal Pain Questionnaire (Submitted on 4/27/2025)  Chief Complaint: Abdominal pain  Chronicity: chronic  Onset: more than 1 year ago  Onset quality: sudden  Frequency: 2 to 4 times per day  Episode duration: 1 Hours  Progression since onset: gradually improving  Pain location: RLQ, right flank  Pain - numeric: 2/10  Pain quality: aching, sharp  Radiates to: RLQ, RUQ  anorexia: No  arthralgias: No  belching: No  constipation: No  diarrhea: No  dysuria: No  fever: No  flatus: No  frequency: Yes  headaches: No  hematochezia: No  hematuria: No  melena: No  myalgias: No  nausea: No  weight loss: No  vomiting: No  Aggravated by: certain positions  Relieved by: bowel movements, movement, standing  - Her bladder symptoms are stable.  - She has been cognizant about sipping water which has improved her nocturia.  Last week she only had 2 nights that she got up more than one time at night.  - She has been using the Tens a couple of times a week, the wand once a week, and her daily exercises provided by her therapist.    - She also goes to chair yoga and and exercise class twice a week.  - Reports that she feels bloated often.  - For the first time in nine months she was able to wear underwear for a long period of time without any pain.      Reviewed case with patient, reviewed plans.    Recommend that she continue to try continue to wear and do  actions that she was avoiding to normalize the sensations that she is feeling.     HISTORY:  Medical History[1]  Surgical History[2]  Social History     Socioeconomic History    Marital status: Single     Spouse name: Not on file    Number of children: Not on file    Years of education: Not on file    Highest education level: Not on file   Occupational History    Not on file   Tobacco Use    Smoking status: Never     Passive exposure: Past    Smokeless tobacco: Never   Vaping Use    Vaping status: Never Used   Substance and Sexual Activity    Alcohol use: Yes     Alcohol/week: 2.0 standard drinks of alcohol     Types: 2 Glasses of wine per week     Comment: Drink occasionally wine/beer with meals/friends.    Drug use: Never    Sexual activity: Not Currently     Partners: Male     Birth control/protection: Post-menopausal, Surgical     Comment: Complete hysterectomy in  when age 45.   Other Topics Concern    Not on file   Social History Narrative    Social History:    Never , No kids    Retired    Nonsmoker    Social ETOH: 1/week    ---    Family History:    M:  Alzheimer's Disease, Fell, broke hip/OP, tremor ( age 90)    F:  'old age' at 92    B:  None                                                          B:  Lipids, HTN                     B: Hearing Loss, HTN    S:  Anemia, dementia onset around 79 yo     S: oral CA, stroke ( age 56)    MUncle:  Colon Cancer    PAunts:Breast Cancer x2 and cousin    Pcousins: Lymphoma     Social Drivers of Health     Financial Resource Strain: Not on file   Food Insecurity: No Food Insecurity (2024)    Hunger Vital Sign     Worried About Running Out of Food in the Last Year: Never true     Ran Out of Food in the Last Year: Never true   Transportation Needs: No Transportation Needs (2024)    PRAPARE - Transportation     Lack of Transportation (Medical): No     Lack of Transportation (Non-Medical): No   Physical Activity: Not on file   Stress: Not  on file   Social Connections: Not on file   Intimate Partner Violence: Not on file   Housing Stability: Unknown (12/6/2024)    Housing Stability Vital Sign     Unable to Pay for Housing in the Last Year: No     Number of Times Moved in the Last Year: Not on file     Homeless in the Last Year: No     Cancer-related family history includes Breast cancer in her father's sister, father's sister, and paternal cousin; Cancer in her mother's brother; Colon cancer in her mother's brother.       IMPRESSION/PLAN:  80 y.o. pelvic floor myalgia stable.    - Continue maintenance exercises.    Follow up as needed by patient    I, Greg Angeles, am scribing for virtually, and in the presence of Dr. Mac Dietz on  04/28/25 at 10:11 PM     Mac Dietz MD          [1]   Past Medical History:  Diagnosis Date    Abnormal MRI, musculoskeletal     MRI hip 6/22: 1. Atrophy of the left gluteus medius and minimus consistent with chronic tendon tears with associated trochanteric bursitis. 2. Mild osteoarthrosis of the left hip joint and small left joint effusion. 3. Mild feathery edema involving the left rectus femoris and adductor longus with adjacent intermuscular edema suggestive of mild component of muscular injury.    Abnormal MRI, musculoskeletal     pt 2: MRI Tib/Fib 1/2020: Benign appearing area in the proximal tibial metaphysis, without suspicious features, likely postsurgical defect    Abnormal Pap smear of cervix 1990    Abnormal x-ray of extremity     XR Tib/fib 5/7/21: IMPRESSION: Cortical based lucency with irregularity in the posterior cortex of the proximal tibial diaphysis. This may represent a prior fibrous lesions. Comparison with prior studies is advised. Followup in 6 months advised for stability if no priors available    Abnormal x-ray of extremity     pt 2: XR Knee: lytic lesion proximal third tibia, knee compartments normal (MRI done that shows postsurgical lesion) 1/16/21: Benign appearing areas in the  proximal tibial metaphysis, without suspicious features, [place] postsurgical defect give pts hx    Anemia     Endometriosis 1990    Fibroid 1990    H/O abdominal ultrasound 06/29/2024    US abd soft tissue: No mass or hernia noted    H/O abdominal x-ray series 03/29/2024    nonobstructive bowel gas pattern.  No evidence of pneumoperitoneum.  Visualized portions of the lung are unremarkable.  No acute bony abnormality.  S-shaped curvature of the thoracolumbar spine.  Slight widening of the pubic symphysi    H/O bone density study 12/20/2021    Ten Year Major Osteoporotic Fracture Risk: 22.52% Ten Year Hip Fracture Risk: 13.6% TScore: 2.2 6/18: THE LOWEST TSCORE IS 1.7, FRAX 10/2%    H/O bone density study 03/27/2024    Lowest T score -2.3. FRACTURE RISK (based on FRAX):                       10-year absolute fracture risk:                           - major osteoporotic fracture = 32.1 %                           - hip fracture = 21.7 %    H/O cervical spine x-ray 04/2019    Mild decrease in the intervertebral disc space at the C56 and C6C7. Moderate facet arthropathy with uncovertebral hypertrophy at C4C5 through C6C7    H/O chest x-ray 08/2014    normal 10/22/21: Mild discogenic degenerative changes are seen throughout the thoracic spine. 7/22: normal    H/O CT scan of abdomen     2/13: liver and kidney cysts 7/22: Stomach is decompressed, slightly limiting its evaluation with equivocal mucosal hyperemia and submucosal edema, somewhat suspicious for acute gastritis. There is mild prominence of mucosal folds and subjective mucosal hyperemia in small bowel, with mesenteric edema, suspicious for infectious/inflammatory enteritis.    H/O CT scan of abdomen     pt 2: Colon is mostly decompressed, with small volume of stool in the proximal colon. Mucosal hyperemia in the colon, most pronounced involving the descending and rectosigmoid colon, with mesocolonic and mesorectal fat stranding, suspicious for acute  proctocolitis. No bowel dilation. Mild pelvic ascites, probably reactive in etiology. No pneumoperitoneum or loculated intraperitoneal collection.    H/O CT scan of abdomen     pt 3: Small hepatic and renal cortical cysts. Thoracolumbar scoliosis and lumbar spine degenerative changes. Repeat CT 7/21/22: Imp approved hyperemia of small bowel and distal colon. Mild generalized mesenteric edema persists with small volume fluid which    H/O CT scan of abdomen 05/31/2024    Left lower pole renal cyst with nodular high density component, favoring  hemorrhagic cyst, though should be further assessed via renal CT/MRI. 0.6cm lung nodule right base, stable from 2013, benign. Diverticulosis    H/O CT scan of brain 11/2021    There are scattered dural ossifications most prominently along the left frontal lobe where there is a 34 mm thick 15 mm diameter ossification. No associated soft tissue mass to suggest meningioma is noted.    H/O CT scan of chest 08/2020    CT: NO PE    H/O echocardiogram 04/2005    Trivial MR with no mitral valve prolapse. Normal LV fx, neg for ischemia 11/20: 1.left ventricular systolic function normal,60-65% est ejection fraction. 2. Poorly visualized anatomical struct due to subopt image. 3. Spectral Doppler shows an impaired relaxation pattern of left ventricular diastolic filling. 4. RVSP within normal limits. 5. No significant valvular pathology seen. 6. 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  No suspicious renal mass lesions are  noted in either kidney.    H/O magnetic resonance imaging of brain and brain stem 11/24/2023    Mild white-matter changes are nonspecific but most likely represent small-vessel ischemic disease in a patient of this age and also involve the gonzalo. No evidence of acute ischemic injury or intracranial mass effect.    H/O spine x-ray 06/22/2023    XR  Sacrum: Acute angulation at the sacrococcygeal junction which may reflect a prior injury/fracture.    H/O x-ray of lumbar spine 06/22/2023    There is mild anterolisthesis of L3 on L4 and L4 on L5. There is mild multilevel endplate sclerosis and osteophytosis. There is moderate facet disease lower lumbar spine as well    History of colitis     CT 7/22 Stomach is decompressed, slightly limiting its evaluation with equivocal mucosal hyperemia and submucosal edema, somewhat suspicious for acute gastritis. There is mild prominence of mucosal folds and subjective mucosal hyperemia in small bowel, with mesenteric edema, suspicious for infectious/inflammatory enteritis.    History of colitis     pt 2: Colon is mostly decompressed, with small volume of stool in the proximal colon. Mucosal hyperemia in the colon, most pronounced involving the descending and rectosigmoid colon, with m    History of PFTs 11/2022    DLCO substantially reduced, air trapping also    Holter monitor, abnormal 10/2022    Sinus karen/tachycardia. 95049, average 74. 741 PVCs (0.05% burden), 8565 PSVCs (0.58%), 23 occurrences of PSVT, longest 34 beats, fastest 162 1 reported event 8/22: HR 66563, average 94. Rare PACs, PVCs, no AF (40hours) 7 events recorded, all autotriggered    HPV (human papilloma virus) infection 1980    Urinary tract infection     Varicella    [2]   Past Surgical History:  Procedure Laterality Date    APPENDECTOMY      BREAST BIOPSY      Biopsy Breast Percutaneous Needle Core, 10/18: Right breast mass, needle localized excisional biopsy (A)  Proliferative epithelial changes including radial sclerosing lesions, sclerosing adenosis and usual ductal hyperplasia (please see comment)  Microcalcifications present in proliferative epithelial changes and unremarkable lobules    BREAST BIOPSY  10/2018    sclerosing lesions, sclerosing adenosis and usual ductal hyperplasia 1. Right breast mass, needle localized excisional biopsy (A)   Proliferative epithelial changes including radial sclerosing lesions, sclerosing adenosis and usual ductal hyperplasia. Microcalcifications present in proliferative epithelial changes and unremarkable lobules/Biopsy clip and biopsy site reparative changes identified    BREAST BIOPSY      pt 2:2. Right breast, new medial margin, excision (B)  Fibrofatty breast tissue 3. Left breast, needle localized excisional biopsy (C)  Proliferative epithelial changes including radial sclerosing lesions, sclerosing adenosis and usual ductal hyperplasia (please see comment)  Microcalcifications present in proliferative epithelial changes and unremarkable lobules    BREAST BIOPSY      pt 3: Biopsy clip and biopsy site reparative changes identified 4. Left breast, new medial margin, excision (D)  Proliferative epithelial changes    BREAST LUMPECTOMY      CARDIAC CATHETERIZATION  09/14/2020 9/13: normal POSTOPERATIVE DIAGNOSES: 1. Normal LEFT MAIN. 2. Normal LAD. 3. Normal CIRCUMFLEX. 4. Normal RIGHT CORONARY ARTERY. 5. Normal left ventricular function, ejection fraction of 70%.    CATARACT EXTRACTION Bilateral     Feb. 2024    COLONOSCOPY  10/05/2021    1/11: Hemorrhoids; normal (8); diverticulosis, biopsy: (-) 9/2019: Diverticulosis, polyp:  Ascending colon, polypectomy (C) - Fragments of tubular adenoma.    COLONOSCOPY  03/28/2024    divertiuculosis    CONDYLOMA EXCISION/FULGURATION  1980    CYSTOSCOPY  10/05/2021    6/21: Cystoscopy Findings: atrophic vaginitis.  Cystoscopy today reveals a urethral stricture dilated to 20 Yemeni with minimal pain. Once inside the bladder there is severe erythema in the trigone. Mild trabeculations. No evidence of stones or tumors. Flow rate 9 cc/s, total volume 172 cc, PVR 61 cc.    ENDOMETRIAL ABLATION      ESOPHAGOGASTRODUODENOSCOPY  10/05/2021    6/21: Cystoscopy Findings: atrophic vaginitis.  Cystoscopy today reveals a urethral stricture dilated to 20 Yemeni with minimal pain. Once inside  the bladder there is severe erythema in the trigone. Mild trabeculations. No evidence of stones or tumors. Flow rate 9 cc/s, total volume 172 cc, PVR 61 cc.    ESOPHAGOGASTRODUODENOSCOPY  03/28/2024    gastritis, healthy gastric bypass    FLEXOR TENDON OF FOREARM / WRIST REPAIR      OTHER SURGICAL HISTORY      Adjacent Tissue Transfer    POLYSOMNOGRAPHY  06/14/2023    PSG: Mild SHIMA, O2  90%, AHI 8.4. 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

## 2025-05-02 ENCOUNTER — APPOINTMENT (OUTPATIENT)
Dept: DERMATOLOGY | Facility: CLINIC | Age: 80
End: 2025-05-02
Payer: COMMERCIAL

## 2025-05-02 DIAGNOSIS — D22.5 MELANOCYTIC NEVI OF TRUNK: ICD-10-CM

## 2025-05-02 DIAGNOSIS — L72.0 MILIA: ICD-10-CM

## 2025-05-02 DIAGNOSIS — Z12.83 ENCOUNTER FOR SCREENING FOR MALIGNANT NEOPLASM OF SKIN: ICD-10-CM

## 2025-05-02 DIAGNOSIS — L82.1 SEBORRHEIC KERATOSIS: ICD-10-CM

## 2025-05-02 DIAGNOSIS — I83.93 ASYMPTOMATIC VARICOSE VEINS OF BOTH LOWER EXTREMITIES: ICD-10-CM

## 2025-05-02 DIAGNOSIS — L73.8 SEBACEOUS HYPERPLASIA OF FACE: ICD-10-CM

## 2025-05-02 DIAGNOSIS — L57.8 PHOTOAGING OF SKIN: ICD-10-CM

## 2025-05-02 DIAGNOSIS — D18.01 HEMANGIOMA OF SKIN: ICD-10-CM

## 2025-05-02 DIAGNOSIS — L71.9 ROSACEA: Primary | ICD-10-CM

## 2025-05-02 PROCEDURE — 1123F ACP DISCUSS/DSCN MKR DOCD: CPT | Performed by: DERMATOLOGY

## 2025-05-02 PROCEDURE — 1157F ADVNC CARE PLAN IN RCRD: CPT | Performed by: DERMATOLOGY

## 2025-05-02 PROCEDURE — 99214 OFFICE O/P EST MOD 30 MIN: CPT | Performed by: DERMATOLOGY

## 2025-05-02 PROCEDURE — 1159F MED LIST DOCD IN RCRD: CPT | Performed by: DERMATOLOGY

## 2025-05-02 RX ORDER — METRONIDAZOLE 7.5 MG/G
1 CREAM TOPICAL DAILY
Qty: 45 G | Refills: 3 | Status: SHIPPED | OUTPATIENT
Start: 2025-05-02 | End: 2025-05-02

## 2025-05-02 RX ORDER — METRONIDAZOLE 7.5 MG/G
1 CREAM TOPICAL 2 TIMES DAILY
Qty: 45 G | Refills: 3 | Status: SHIPPED | OUTPATIENT
Start: 2025-05-02 | End: 2026-05-02

## 2025-05-02 NOTE — Clinical Note
The benign nature of these skin lesions were reviewed, no treatment is necessary.   Please follow up for any new or pre-existing lesion that is changing in size, shape, color, becomes painful, tender, itches or bleed.

## 2025-05-02 NOTE — Clinical Note
The chronic and intermittently flaring nature of the skin condition was discussed with the patient today. Discussed common triggers associated with rosacea including sun exposure, spicy foods, alcohol, and stress. The various treatment options and risks and benefits reviewed. Patient to begin metronidazole 0.75% cream twice daily to face.    Contact office if not improving or worsening.    She is aware it will help with some erythema, but may not completely resolve

## 2025-05-02 NOTE — Clinical Note
The nature of the diagnosis was explained to the patient. Advised this is a chornic condition that will come and go and is related to superficial venous system. Overtime the venous valves become less efficient in preventing back flow of blood resulting in increased pressure    Treatment recommendations including wearing compression stockings daily to prevent worsening     Recommended wearing compression stockings 20 to 30mmHg compression such as procompression or sigvaris to be worn daily.      Treatment of varicose veins is considered cosmetic with sclerotherapy or vein stripping. Patient defers treatment at this time

## 2025-05-02 NOTE — Clinical Note
Mottled pigmentation with telangiectasias and brown reticular macules in sun exposed areas of the body.

## 2025-05-02 NOTE — Clinical Note
Clinically benign appearing nevi, no treatment is necessary.  The importance of sun protection was reviewed: including the use of a broad spectrum sunscreen that protects against both UVA/UVB rays, with ingredients such as Zinc oxide or titanium dioxide, wearing sun protective clothing and sun avoidance.   ABCDEs of melanoma reviewed.  Please follow up should you notice any new or changing pre-existing skin lesion.

## 2025-05-02 NOTE — PROGRESS NOTES
Lennie Munson is a 80 y.o. female who presents for the following: Skin Check (Annual - LV 05/02/24 ([facility]) - Patient has a history of: Rosacea, Milia, Seborrheic dermatitis, Sebaceous hyperplasia, SK  Area(s) of concern:  milia, hemangiomas, age spots/) and Rosacea (Patient mentioned that when she eats chocolate and spicy foods the rosacea is more apparent.  Patient will place Neosporin on area with no improvement.).          Review of Systems:  No other skin or systemic complaints other than what is documented elsewhere in the note.    The following portions of the chart were reviewed this encounter and updated as appropriate:         Skin Cancer History  Biopsy Log Book  No skin cancers from Specimen Tracking.    Additional History      Specialty Problems    None       Objective   Well appearing patient in no apparent distress; mood and affect are within normal limits.    A full examination was performed including scalp, head, eyes, ears, nose, lips, neck, chest, axillae, abdomen, back, buttocks, bilateral upper extremities, bilateral lower extremities, hands, feet, fingers, toes, fingernails, and toenails. Declined examination of genitals, does see provider for routine pelvic examinations.  All findings within normal limits unless otherwise noted below.        Assessment/Plan   Skin Exam  1. ROSACEA  Head - Anterior (Face)  Mid face erythema with telangiectasias  The chronic and intermittently flaring nature of the skin condition was discussed with the patient today. Discussed common triggers associated with rosacea including sun exposure, spicy foods, alcohol, and stress. The various treatment options and risks and benefits reviewed. Patient to begin metronidazole 0.75% cream twice daily to face.    Contact office if not improving or worsening.    She is aware it will help with some erythema, but may not completely resolve    Related Medications  metroNIDAZOLE (Metrocream) 0.75 % cream  Apply 1  Application topically 2 times a day. To face  2. SEBACEOUS HYPERPLASIA OF FACE (3)  Mid Forehead, Right Forehead, Right Malar Cheek  Small yellow, lobulated papules with a central dell.  Benign nature of these skin lesions were reviewed with the patient. Lesions can be treated, however this is a cosmetic procedure and not covered by insurance.  3. MILIA (3)  Left Alar Crease, Left Buccal Cheek, Left Supraclavicular Area  Small 1-2 mm white papules.  The benign nature of these skin lesions were reviewed, no treatment is necessary.   Please follow up for any new or pre-existing lesion that is changing in size, shape, color, becomes painful, tender, itches or bleed.    4. SEBORRHEIC KERATOSIS  Generalized  Brown, tan waxy macules and stuck on appearing papules and plaques  The benign nature of these skin lesions reviewed, reassure provided and no further treatment needed at this time.   These lesions can be removed, if symptomatic (itching, bleeding, rubbing on clothing, painful), otherwise removal is considered cosmetic.   5. HEMANGIOMA OF SKIN  Generalized  Cherry red papules  The benign nature of these skin lesions were reviewed, no treatment is necessary.   Please follow up for any new or pre-existing lesion that is changing in size, shape, color, becomes painful, tender, itches or bleed.  6. MELANOCYTIC NEVI OF TRUNK  Trunk  Tan-brown symmetric macules and papules  Clinically benign appearing nevi, no treatment is necessary.  The importance of sun protection was reviewed: including the use of a broad spectrum sunscreen that protects against both UVA/UVB rays, with ingredients such as Zinc oxide or titanium dioxide, wearing sun protective clothing and sun avoidance.   ABCDEs of melanoma reviewed.  Please follow up should you notice any new or changing pre-existing skin lesion.  7. 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 Advised this is a chornic condition that will come and go and is related to superficial venous system. Overtime the venous valves become less efficient in preventing back flow of blood resulting in increased pressure    Treatment recommendations including wearing compression stockings daily to prevent worsening     Recommended wearing compression stockings 20 to 30mmHg compression such as procompression or sigvaris to be worn daily.      Treatment of varicose veins is considered cosmetic with sclerotherapy or vein stripping. Patient defers treatment at this time  8. PHOTOAGING OF SKIN  Generalized  Mottled pigmentation with telangiectasias and brown reticular macules in sun exposed areas of the body.   The risk of chronic, cumulative sun damage and risk of development of skin cancer was reviewed today.   The importance of sun protection was reviewed: including the use of a broad spectrum sunscreen that protects against both UVA/UVB rays, with ingredients such as Zinc oxide or titanium dioxide, wearing sun protective clothing and sun avoidance. We reviewed the warning signs of non-melanoma skin cancer and ABCDEs of melanoma  Please follow up should you notice any new or changing pre-existing skin lesion.  9. ENCOUNTER FOR SCREENING FOR MALIGNANT NEOPLASM OF SKIN  Generalized  No suspicious lesions noted on examination today   The risk of chronic, cumulative sun damage and risk of development of skin cancer was reviewed today.   The importance of sun protection was reviewed: including the use of a broad spectrum sunscreen that protects against both UVA/UVB rays, with ingredients such as Zinc oxide or titanium dioxide, wearing sun protective clothing and sun avoidance. We reviewed the warning signs of non-melanoma skin cancer and ABCDEs of melanoma  Please follow up should you notice any new or changing pre-existing skin lesion.    Follow up in 1 year for FBSE or sooner as needed    Marquise Hayes MD   PGY4,  Department of Dermatology    I saw and evaluated the patient. I personally obtained the key and critical portions of the history and physical exam or was physically present for key and critical portions performed by the resident/fellow. I reviewed the resident/fellow's documentation and discussed the patient with the resident/fellow. I agree with the resident/fellow's medical decision making as documented in the note.    Kathy Bullock, DO

## 2025-05-05 ENCOUNTER — APPOINTMENT (OUTPATIENT)
Dept: PHYSICAL THERAPY | Facility: CLINIC | Age: 80
End: 2025-05-05
Payer: MEDICARE

## 2025-05-05 DIAGNOSIS — M62.89 HIGH-TONE PELVIC FLOOR DYSFUNCTION: Primary | ICD-10-CM

## 2025-05-13 ENCOUNTER — APPOINTMENT (OUTPATIENT)
Dept: CARDIOLOGY | Facility: CLINIC | Age: 80
End: 2025-05-13
Payer: COMMERCIAL

## 2025-05-13 VITALS
BODY MASS INDEX: 24.59 KG/M2 | SYSTOLIC BLOOD PRESSURE: 110 MMHG | DIASTOLIC BLOOD PRESSURE: 60 MMHG | HEIGHT: 66 IN | WEIGHT: 153 LBS | HEART RATE: 68 BPM | OXYGEN SATURATION: 98 %

## 2025-05-13 DIAGNOSIS — I48.0 PAROXYSMAL ATRIAL FIBRILLATION (MULTI): Primary | ICD-10-CM

## 2025-05-13 DIAGNOSIS — I48.92 ATRIAL FLUTTER, UNSPECIFIED TYPE (MULTI): ICD-10-CM

## 2025-05-13 PROCEDURE — 93010 ELECTROCARDIOGRAM REPORT: CPT | Performed by: INTERNAL MEDICINE

## 2025-05-13 PROCEDURE — 93005 ELECTROCARDIOGRAM TRACING: CPT | Performed by: INTERNAL MEDICINE

## 2025-05-13 PROCEDURE — 1036F TOBACCO NON-USER: CPT | Performed by: INTERNAL MEDICINE

## 2025-05-13 PROCEDURE — 1160F RVW MEDS BY RX/DR IN RCRD: CPT | Performed by: INTERNAL MEDICINE

## 2025-05-13 PROCEDURE — 99214 OFFICE O/P EST MOD 30 MIN: CPT | Performed by: INTERNAL MEDICINE

## 2025-05-13 PROCEDURE — 99214 OFFICE O/P EST MOD 30 MIN: CPT | Mod: 25 | Performed by: INTERNAL MEDICINE

## 2025-05-13 PROCEDURE — 1159F MED LIST DOCD IN RCRD: CPT | Performed by: INTERNAL MEDICINE

## 2025-05-13 NOTE — PROGRESS NOTES
Chief Complaint:   No chief complaint on file.     History Of Present Illness:    Adelia Munson is a 80 y.o. female with a history of atrial fibrillation/flutter, esophagitis, and degenerative joint disease here for follow-up.     Overall the patient is doing well from a cardiovascular standpoint.  Denies any palpitations.    Brings up sensation of feeling lightheaded.  When asked about it she says that there will be components of the room spinning also associated with the lightheaded episodes.  She wonders if they were triggered by wearing a band around her head or wearing a knitted cap during the wintertime.    She also has had more noticeable venous distention of the legs.  She has had compression stockings however has had what sounds like a neuropathy of the left lower leg and cannot tolerate compression on that leg.  There has been no significant swelling of the legs.    Although she had spoken to her neurologist about using primidone, and also spoken to Dr. Crowell about temporarily holding the Eliquis, she has decided to hold off on any medication adjustments.     She tells me she is friends with Pippa Ana who was a friend of my wife's late grandmother.    Reviewed Dr. Crowell's note from December 2024.     14-day monitor October 2022 revealing predominantly sinus rhythm with no evidence of atrial fibrillation or flutter. Episodes of SVT with longest of 34 beats and fastest at 162 bpm.     Nuclear stress test 9/9/2022: No evidence of ischemia or scar. EF greater than 65%.     48-hour monitor August 2022: Predominantly sinus rhythm with rare PACs and PVCs.    Echocardiogram 7/6/2022: EF 60 to 65%.  RVSP 32 mmHg.     Catheterization September 2013: Angiographically normal right dominant system, EF 70%.      Past Medical History:  She has a past medical history of Abnormal MRI, musculoskeletal, Abnormal MRI, musculoskeletal, Abnormal Pap smear of cervix (1990), Abnormal x-ray of extremity, Abnormal  x-ray of extremity, Anemia, Endometriosis (1990), Fibroid (1990), H/O abdominal ultrasound (06/29/2024), H/O abdominal x-ray series (03/29/2024), H/O bone density study (12/20/2021), H/O bone density study (03/27/2024), H/O cervical spine x-ray (04/2019), H/O chest x-ray (08/2014), H/O CT scan of abdomen, H/O CT scan of abdomen, H/O CT scan of abdomen, H/O CT scan of abdomen (05/31/2024), H/O CT scan of brain (11/2021), H/O CT scan of chest (08/2020), H/O echocardiogram (04/2005), H/O magnetic resonance imaging (08/19/2024), H/O magnetic resonance imaging of brain and brain stem (11/24/2023), H/O spine x-ray (06/22/2023), H/O x-ray of lumbar spine (06/22/2023), History of colitis, History of colitis, History of PFTs (11/2022), Holter monitor, abnormal (10/2022), HPV (human papilloma virus) infection (1980), Urinary tract infection, and Varicella.    Past Surgical History:  She has a past surgical history that includes Tumor excision; Flexor tendon of forearm / wrist repair; Skin biopsy (07/2022); Appendectomy; Total abdominal hysterectomy; Cardiac catheterization (09/14/2020); Other surgical history; Cystoscopy (10/05/2021); Breast biopsy; Esophagogastroduodenoscopy (10/05/2021); Colonoscopy (10/05/2021); Breast biopsy (10/2018); Breast biopsy; Breast biopsy; Polysomnography (06/14/2023); Colonoscopy (03/28/2024); Esophagogastroduodenoscopy (03/28/2024); Endometrial ablation; Breast lumpectomy; Condyloma excision/fulguration (1980); and Cataract extraction (Bilateral).      Social History:  She reports that she has never smoked. She has been exposed to tobacco smoke. She has never used smokeless tobacco. She reports current alcohol use of about 2.0 standard drinks of alcohol per week. She reports that she does not use drugs.    Family History:  Family History   Problem Relation Name Age of Onset    Alzheimer's disease Mother Alyse 80 - 89    Hearing loss Mother Alyse     Other (essential tremor) Mother Alyse      "Hearing loss Father Trav     Anemia Sister Jr     Transient ischemic attack Sister Jr     Hearing loss Sister Jr     Other (essential tremor) Sister Jr     Dementia Sister Jr     Hypertension Brother Sung     Hyperlipidemia Brother Sung     Other (essential tremor) Brother Sung     Hearing loss Brother Trav     Other (essential tremor) Brother Trav     Hearing loss Brother Geovanni     Other (essential tremor) Brother Geovanni     Alzheimer's disease Mother's Sister  80 - 89    Colon cancer Mother's Brother Abdirizak Cervantes     Cancer Mother's Brother Zak Cervantes     Breast cancer Father's Sister Vero Tapia     Breast cancer Father's Sister [place] Ringgold     Tremor Maternal Grandmother      Alcohol abuse Paternal Grandfather Jase Farrell     Breast cancer Paternal Cousin          Allergies:  Amoxicillin-pot clavulanate, Celecoxib, Enoxaparin, Gabapentin, Lexapro [escitalopram oxalate], Meloxicam, Mometasone, Moxifloxacin hcl, Naproxen, Nitrate analogues, Nitrofurantoin macrocrystal, Piroxicam, Propranolol, Ranitidine hcl, and Amitriptyline    Outpatient Medications:  Current Outpatient Medications   Medication Instructions    Eliquis 5 mg, oral, 2 times daily    esomeprazole (NEXIUM) 20 mg, oral, Daily    Lactobacillus acidophilus (Probiotic) 10 billion cell capsule 1 capsule, Daily    metoprolol succinate XL (TOPROL-XL) 25 mg, oral, Daily, Do not crush or chew.    metroNIDAZOLE (Metrocream) 0.75 % cream 1 Application, Topical, 2 times daily, To face       Last Recorded Vitals:  Visit Vitals  /60 (BP Location: Left arm, Patient Position: Sitting)   Pulse 68   Ht 1.676 m (5' 6\")   Wt 69.4 kg (153 lb)   SpO2 98%   BMI 24.69 kg/m²   OB Status Postmenopausal   Smoking Status Never   BSA 1.8 m²      LASTWT(3):   Wt Readings from Last 3 Encounters:   05/13/25 69.4 kg (153 lb)   12/06/24 70 kg (154 lb 4 oz)   12/04/24 70.1 kg (154 lb 9.6 oz)       Physical Exam:  In general: " alert and in no acute distress.   HEENT: Carotid upstrokes normal with no bruits. JVP is normal.   Pulmonary: Clear to auscultation bilaterally.  Cardiovascular: S1,S2, regular. No appreciable murmurs, rubs or gallops.   Lower extremities: Warm. 2+ distal pulses. No edema.  Mild varicosities of the lower extremities.     Last Labs:  CBC -  Recent Labs     09/30/24  0938 10/16/23  1022 11/28/22  0904   WBC 4.2* 4.5 3.3*   HGB 13.4 12.9 13.5   HCT 40.8 39.4 41.2    193 183   MCV 92 92 88       CMP -  Recent Labs     09/30/24  0938 10/16/23  1022 10/04/22  1032 07/09/22  2333 07/08/22  1946/22  0547 07/05/22  1739    140 140   < > 133* 135* 130*   K 4.4 4.3 4.6   < > 4.1 4.1 3.8    102 102   < > 93* 98 93*   CO2 32 31 32   < > 30 28 28   ANIONGAP 11 11 11   < > 14 13 13   BUN 17 15 13   < > 11 9 10   CREATININE 0.94 0.92 0.85   < > 0.75 0.82 0.84   EGFR 62 64  --   --   --   --   --    MG  --   --   --   --  1.94 1.80 1.80    < > = values in this interval not displayed.     Recent Labs     09/30/24  0938 10/16/23  1022 10/04/22  1032   ALBUMIN 4.2 4.1 4.1   ALKPHOS 27* 23* 25*   ALT 13 12 15   AST 17 17 17   BILITOT 0.5 0.6 0.5       LIPID PANEL -   Recent Labs     09/30/24  0938 10/16/23  1022 09/12/22  0950 09/13/21  1007 08/14/20  1651   CHOL 183 183 195 197 164   LDLCALC 99 101*  --   --   --    LDLF  --   --  104* 102* 89   HDL 62.5 64.6 61.0 74.0 55.0   TRIG 109 88 150* 106 99       Recent Labs     07/23/22  1200 07/05/22  1739   * 74           Assessment/Plan   1) paroxysmal atrial fibrillation/flutter: In sinus rhythm today by ECG and exam.  Would continue with rate control and anticoagulation.    Once again, if she is interested in trying to use the medication that interacts with Eliquis, she is permitted to stop the Eliquis for short period of time to see if this would help with the tremors.    2) varicosities: Would continue to observe as she cannot tolerate compression  stockings.    3) lightheadedness: Sounds more like vertigo.  Will ask her to speak to her PCP or neurologist at upcoming appointments.    4) follow-up: 1 year or sooner if needed      Ash Burnett MD

## 2025-05-14 ENCOUNTER — OFFICE VISIT (OUTPATIENT)
Dept: ORTHOPEDIC SURGERY | Facility: CLINIC | Age: 80
End: 2025-05-14
Payer: MEDICARE

## 2025-05-14 ENCOUNTER — HOSPITAL ENCOUNTER (OUTPATIENT)
Dept: RADIOLOGY | Facility: CLINIC | Age: 80
Discharge: HOME | End: 2025-05-14
Payer: MEDICARE

## 2025-05-14 DIAGNOSIS — M25.551 BILATERAL HIP PAIN: ICD-10-CM

## 2025-05-14 DIAGNOSIS — M53.3 SI (SACROILIAC) JOINT DYSFUNCTION: Primary | ICD-10-CM

## 2025-05-14 DIAGNOSIS — M25.552 BILATERAL HIP PAIN: ICD-10-CM

## 2025-05-14 DIAGNOSIS — M21.70 INEQUALITY OF LEG LENGTH: ICD-10-CM

## 2025-05-14 PROCEDURE — 73522 X-RAY EXAM HIPS BI 3-4 VIEWS: CPT

## 2025-05-14 PROCEDURE — 1159F MED LIST DOCD IN RCRD: CPT | Performed by: ORTHOPAEDIC SURGERY

## 2025-05-14 PROCEDURE — 99213 OFFICE O/P EST LOW 20 MIN: CPT | Performed by: ORTHOPAEDIC SURGERY

## 2025-05-14 PROCEDURE — 73522 X-RAY EXAM HIPS BI 3-4 VIEWS: CPT | Mod: BILATERAL PROCEDURE | Performed by: ORTHOPAEDIC SURGERY

## 2025-05-14 PROCEDURE — 1160F RVW MEDS BY RX/DR IN RCRD: CPT | Performed by: ORTHOPAEDIC SURGERY

## 2025-05-14 PROCEDURE — 99214 OFFICE O/P EST MOD 30 MIN: CPT | Performed by: ORTHOPAEDIC SURGERY

## 2025-05-14 PROCEDURE — 1036F TOBACCO NON-USER: CPT | Performed by: ORTHOPAEDIC SURGERY

## 2025-05-14 RX ORDER — METHYLPREDNISOLONE 4 MG/1
TABLET ORAL
Qty: 21 TABLET | Refills: 0 | Status: CANCELLED | OUTPATIENT
Start: 2025-05-14

## 2025-05-14 NOTE — PROGRESS NOTES
Subjective    Patient ID: Adelia    Chief Complaint:   Chief Complaint   Patient presents with    Right Hip - Pain    Left Hip - Pain     History of present illness    80-year-old female presented clinic today for evaluation bilateral hip pain.  We have seen her previously for both knees.  She states she has had bilateral hip pain intermittently now for last 2 years worse over the last year.  She states she has difficulty with sitting standing at times.  She gets occasional groin pain after sitting for long periods.  She saw her primary care physician as well as her gastroenterologist as well as her OB/GYN for evaluation where multiple diagnostic tests were ordered to rule out other causes.  She complains of posterior lateral right buttock pain as well as left buttock pain.  She complains of left SI joint pain.  She states that she was sent by the OB/GYN for pelvic floor physical therapy out in [place] which gave her great relief for her symptoms.  It has been quite sometime since this was completed.  Early this spring she started having flareups of these similar symptoms.  No numbness tingling no fevers chills.      Past medical , Surgical, Family and social history reviewed.      Physical exam  General: No acute distress and breathing comfortably.  Patient is pleasant and cooperative with the examination.    Extremity  Both hips are neurovascular intact.  She is good range of motion.  No sign of any instability.  No groin pain.  They have straight leg raise test.  Tender to palpation posterior lateral buttock of the right hip and of the left.  Tender to palpation over the left SI joint.  No calf swelling or tenderness.    Diagnostics  Please see dictated x-ray report  Imaging  No results found.    Cardiology, Vascular, and Other Imaging  ECG 12 lead (Clinic Performed)  Result Date: 5/13/2025  Sinus rhythm.  Nonspecific ST change noted in the high lateral leads.  As compared with ECG 5/28/2024 there is no  significant change.         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  At this time we long discussion regards to continued conservative treatment options  2.  Since she had great results with her pelvic floor PT we will go ahead and revisit this and focus on pelvic stability core strength prescription for therapy was given.  She is taking Eliquis for anticoagulation so we will put her on a Medrol Dosepak to help with acute inflammation  3.  She will continue weightbearing activity as tolerated.  Follow-up with us in 6 weeks for reevaluation without x-ray.  For complete plan and/or surgical details, please refer to Dr. Do's portion of the split/shared dictation.  4.  All of the patient's questions were answered.    Orders Placed This Encounter    XR hips bilateral 3 or 4 VW w pelvis when performed       This note was prepared using voice recognition software.  The details of this note are correct and have been reviewed, and corrected to the best of my ability.  Some grammatical areas may persist related to the Dragon software    Zhao De Jesus PA-C, [facility]  [facility] [place]    [phone]    In a face-to-face encounter performed today, I Joey Do MD performed a history and physical examination, discussed pertinent diagnostic studies if indicated, and discussed diagnosis and management strategies with both the patient and the midlevel provider.  I reviewed the midlevel's note and agree with the documented findings and plan of care.  Greater than 50% of the evaluation and treatment decision was performed by the physician myself during today's visit.    80-year-old female here with new complaint of bilateral hip pain states most of the pain is in the posterior aspect of both hips.  Been ongoing for the past couple years does not recall any specific episode of injury denies any groin  pain has difficulty going from seated to standing position she notices that she sometimes walks with a limp.  She is unable to take oral anti-inflammatory medication as she is on Eliquis.  On examination she has got good range of motion both hips no pain with internal ex rotation flexion abduction external rotation maneuver is negative.  X-rays are obtained today see my dictated x-ray report.  Impression is SI joint dysfunction with low back pain.  Paco is Medrol Dosepak, physical therapy follow-up 6 weeks.          This note was prepared using voice recognition software.  The details of this note are correct and have been reviewed, and corrected to the best of my ability.  Some grammatical areas may persist related to the Dragon software    Joey Do MD  Senior Attending Physician  [facility]    [phone]

## 2025-05-15 DIAGNOSIS — M53.3 SI (SACROILIAC) JOINT DYSFUNCTION: Primary | ICD-10-CM

## 2025-05-15 RX ORDER — METHYLPREDNISOLONE 4 MG/1
TABLET ORAL
Qty: 21 TABLET | Refills: 0 | Status: SHIPPED | OUTPATIENT
Start: 2025-05-15

## 2025-05-16 ENCOUNTER — PATIENT MESSAGE (OUTPATIENT)
Dept: PRIMARY CARE | Facility: CLINIC | Age: 80
End: 2025-05-16
Payer: MEDICARE

## 2025-05-16 NOTE — PATIENT COMMUNICATION
Spoke with patient.  Relayed message and recommendation.  Patient stated she would call Dr. Jamil office.

## 2025-05-28 PROCEDURE — RXMED WILLOW AMBULATORY MEDICATION CHARGE

## 2025-06-02 ENCOUNTER — PHARMACY VISIT (OUTPATIENT)
Dept: PHARMACY | Facility: CLINIC | Age: 80
End: 2025-06-02
Payer: COMMERCIAL

## 2025-06-02 ENCOUNTER — APPOINTMENT (OUTPATIENT)
Dept: PRIMARY CARE | Facility: CLINIC | Age: 80
End: 2025-06-02
Payer: COMMERCIAL

## 2025-06-02 VITALS
BODY MASS INDEX: 24.65 KG/M2 | HEART RATE: 65 BPM | TEMPERATURE: 98.4 F | OXYGEN SATURATION: 98 % | DIASTOLIC BLOOD PRESSURE: 56 MMHG | WEIGHT: 153.4 LBS | SYSTOLIC BLOOD PRESSURE: 97 MMHG | HEIGHT: 66 IN

## 2025-06-02 DIAGNOSIS — F41.9 ANXIETY: Primary | ICD-10-CM

## 2025-06-02 DIAGNOSIS — M75.101 ROTATOR CUFF SYNDROME OF RIGHT SHOULDER: ICD-10-CM

## 2025-06-02 DIAGNOSIS — Z79.899 MEDICATION MANAGEMENT: ICD-10-CM

## 2025-06-02 DIAGNOSIS — N60.02 BILATERAL BREAST CYSTS: ICD-10-CM

## 2025-06-02 DIAGNOSIS — G25.0 ESSENTIAL TREMOR: ICD-10-CM

## 2025-06-02 DIAGNOSIS — R44.3 HALLUCINATIONS: ICD-10-CM

## 2025-06-02 DIAGNOSIS — M47.816 LUMBAR SPONDYLOSIS: ICD-10-CM

## 2025-06-02 DIAGNOSIS — K21.9 CHRONIC GERD: ICD-10-CM

## 2025-06-02 DIAGNOSIS — J43.8 OTHER EMPHYSEMA (MULTI): ICD-10-CM

## 2025-06-02 DIAGNOSIS — N60.01 BILATERAL BREAST CYSTS: ICD-10-CM

## 2025-06-02 DIAGNOSIS — R49.0 HOARSENESS OF VOICE: ICD-10-CM

## 2025-06-02 DIAGNOSIS — L98.9 SKIN LESION OF CHEEK: ICD-10-CM

## 2025-06-02 DIAGNOSIS — I48.0 PAROXYSMAL ATRIAL FIBRILLATION (MULTI): ICD-10-CM

## 2025-06-02 PROBLEM — I48.92 ATRIAL FLUTTER (MULTI): Status: RESOLVED | Noted: 2023-02-05 | Resolved: 2025-06-02

## 2025-06-02 PROCEDURE — 1036F TOBACCO NON-USER: CPT | Performed by: INTERNAL MEDICINE

## 2025-06-02 PROCEDURE — 1159F MED LIST DOCD IN RCRD: CPT | Performed by: INTERNAL MEDICINE

## 2025-06-02 PROCEDURE — G2211 COMPLEX E/M VISIT ADD ON: HCPCS | Performed by: INTERNAL MEDICINE

## 2025-06-02 PROCEDURE — 99215 OFFICE O/P EST HI 40 MIN: CPT | Performed by: INTERNAL MEDICINE

## 2025-06-02 PROCEDURE — 1160F RVW MEDS BY RX/DR IN RCRD: CPT | Performed by: INTERNAL MEDICINE

## 2025-06-02 RX ORDER — CLONAZEPAM 0.5 MG/1
0.5 TABLET ORAL NIGHTLY
Qty: 90 TABLET | Refills: 0 | Status: SHIPPED | OUTPATIENT
Start: 2025-06-02 | End: 2025-11-29

## 2025-06-02 RX ORDER — HYDROGEN PEROXIDE 3 %
20 SOLUTION, NON-ORAL MISCELLANEOUS 2 TIMES DAILY
Qty: 180 CAPSULE | Refills: 3 | Status: SHIPPED | OUTPATIENT
Start: 2025-06-02

## 2025-06-02 RX ORDER — MUPIROCIN 20 MG/G
OINTMENT TOPICAL 3 TIMES DAILY
Qty: 22 G | Refills: 0 | Status: SHIPPED | OUTPATIENT
Start: 2025-06-02 | End: 2025-06-12

## 2025-06-02 ASSESSMENT — ANXIETY QUESTIONNAIRES
6. BECOMING EASILY ANNOYED OR IRRITABLE: SEVERAL DAYS
4. TROUBLE RELAXING: NOT AT ALL
1. FEELING NERVOUS, ANXIOUS, OR ON EDGE: SEVERAL DAYS
GAD7 TOTAL SCORE: 4
5. BEING SO RESTLESS THAT IT IS HARD TO SIT STILL: NOT AT ALL
IF YOU CHECKED OFF ANY PROBLEMS ON THIS QUESTIONNAIRE, HOW DIFFICULT HAVE THESE PROBLEMS MADE IT FOR YOU TO DO YOUR WORK, TAKE CARE OF THINGS AT HOME, OR GET ALONG WITH OTHER PEOPLE: SOMEWHAT DIFFICULT
3. WORRYING TOO MUCH ABOUT DIFFERENT THINGS: SEVERAL DAYS
2. NOT BEING ABLE TO STOP OR CONTROL WORRYING: SEVERAL DAYS
7. FEELING AFRAID AS IF SOMETHING AWFUL MIGHT HAPPEN: NOT AT ALL

## 2025-06-02 NOTE — PROGRESS NOTES
CC/HPI:   Follow-up (Multiple Issues).    She has anxiety and still has the tremor  She had been hesitatnt to take medication  We did [employer] testing  She spoke to someone at [email or web address] and was told she would be billed , at most $300  [email or web address] testing was done 12/12/24: Use As directed: Pristiq, Viibryd, Fetzima; Xanax, Buspar, Klonopin, Librium, Ativan, Restoril, Belsomra, Lunesta, Dayvigo, Serax, Tranxene; Vrayalr, Latuda,Invega, Geodon, Saphris, Navane, Caplyta; Tegretol, Lamictal, Trileptal; Guanfacine  Moderate Gene-Drug Interaction: Zoloft, Celexa, Lexapro, Trazodone; Valium (1), Ambien; Seroquel (1), Zyprexa, Clozaril, Haldol, Prolixin; Dpakote; Concerta, Focalin; Qelbree  Significant Gene-Drug Interactions: Wellbutrin, Prozac, Effexor, Remeron, Elavil, Doxepin, Imipramine, Pamelor, Trintellix, Paxil, Cymbalta, Luvox, Propranolol; Abilify, Rexulti, Risperdal, Mallaril, Fanapt, Thorazine,; Stratterra    C/o bite on her face left cheek  Started 6 days ago  Using hot compresses and topical antibiotic cream  Has a dermatologist    Feels like her voice has changed  More raspy  Is on Nexium 20mg  Occ has reflux after eating a lot of food in one sitting    C/O feeling heavy pressure in feet in AM while in bed  Gone after she gets up and walks around  Also when feet elevated it happens  Drinks water  No edema  Has spider veins    C/o Shoulder pain  Mild   Hurts to reach backwards    C/o dizziness after wearing hat in winter  Was wearing a hair tieback apparatus also  Has not done her Holter  Had mild concussion in past  Still having occ hallucinations - seem to involve her neighbors  (She has has anxiety over the years with regard to her neighbors)    Saw Ortho 5/25 C(opied)  Bilateral hip SI joint dysfunction worse on the left  Mild lumbar radiculopathy  Treatment plan  1.  At this time we long discussion regards to continued conservative treatment options  2.  Since she had great results with her pelvic floor PT  "we will go ahead and revisit this and focus on pelvic stability core strength prescription for therapy was given.  She is taking Eliquis for anticoagulation so we will put her on a Medrol Dosepak to help with acute inflammation  3.  She will continue weightbearing activity as tolerated.  Follow-up with us in 6 weeks for reevaluation without x-ray.  For complete plan and/or surgical details, please refer to Dr. Do's portion of the split/shared dictation.  4.  All of the patient's questions were answered.    She is to do PT at [facility]    Saw Cardiology 5/25 (copied)  1) paroxysmal atrial fibrillation/flutter: In sinus rhythm today by ECG and exam.  Would continue with rate control and anticoagulation.  Once again, if she is interested in trying to use the medication that interacts with Eliquis, she is permitted to stop the Eliquis for short period of time to see if this would help with the tremors.  2) varicosities: Would continue to observe as she cannot tolerate compression stockings.  3) lightheadedness: Sounds more like vertigo.  Will ask her to speak to her PCP or neurologist at upcoming appointments.  4) follow-up: 1 year or sooner if needed    Saw Derm 5/25 (copied)  1. ROSACEA  Head - Anterior (Face)  Mid face erythema with telangiectasias  The chronic and intermittently flaring nature of the skin condition was discussed with the patient today. Discussed common triggers associated with rosacea including sun exposure, spicy foods, alcohol, and stress. The various treatment options and risks and benefits reviewed. Patient to begin metronidazole 0.75% cream twice daily to face.     Saw Gyn 4/25 (Copied)  80 y.o. pelvic floor myalgia stable.  - Continue maintenance exercises.    Saw Ep in 12/25 (Copied)  1.  Paroxysmal atrial flutter, documented on just 1 occasion back in July 2022 and the patient was ill with colitis.  She has not had documented recurrences but continues on a straightforward \"rate control " "strategy\" with beta-blockers, along with Eliquis anticoagulation, given her [id number] score of at least 3 (female gender, 2 points for age over 75).  2.  Minor conduction system disease, with left anterior fascicular block, again noted on recent EKG from 5/28/2024.  An intermittent right bundle branch block has been noted in the past as well.  There has been no evidence of high-grade AV block.  3.  Benign essential tremor, being followed by neurology.  At one point, the patient had been prescribed propranolol to replace metoprolol, but it does not appear that she ever took this long-term.  1.  The patient will continue her current medical regimen.  2.  She is certainly welcome to a trial off Eliquis for a few months if a new neurologic agent is felt to be indicated to treat her tremor.  3.  I recommend cardiac event monitoring every 3 years or so, to help exclude any occult atrial fibrillation or flutter.  If this rhythm is again seen in the future, the patient could be considered for specific antiarrhythmic therapy (e.g. flecainide) or for ablation options.    Of note recall she saw Neuro in 11/24 (copied)  Essential Tremor - she has a side to side head tremor and a left>right hand tremor  - reviewed options; primidone interacts with eliquis; topirmate increases her risk of kidney stones (which she has had in the past)  - we discussed possibly increasing Metoprolol - patient declines for onw  - will continue to monitor  2.  Hypnopompic Hallucinations  - occurs rarely  - reassured patient  Follow up in 1 year    Had Mamm 5/23/25: Finding 1:  Stable complicated cyst in the left breast at 3 o'clock, 3 cm from the nipple is probably benign. Follow-up with diagnostic mammogram is recommended in 12 months. The left breast complicated cyst is not suspiciously changed from 05/15/2024. Bilateral diagnostic mammogram is recommended in 12 months. At that time, the probably benign finding may be again evaluated to " demonstrate 2 years of imaging stability.   Finding 2:  Simple cyst in the right breast at 10 o'clock, 7 cm from the nipple is benign.   BI-RADS Category 3: Probably Benign     Based on the Tyrer-Cuzick (TC) risk assessment model, this patient has a 1.9%  lifetime risk of developing breast cancer, meaning they are at   average risk for developing breast cancer.     Of note: Breast bx in 2018 sclerosing lesions, sclerosing adenosis and usual ductal hyperplasia 1. Right breast mass, needle localized excisional biopsy (A) Proliferative epithelial changes including radial sclerosing lesions, sclerosing adenosis and usual ductal hyperplasia. Microcalcifications present in proliferative epithelial changes and unremarkable lobules/Biopsy clip and biopsy site reparative changes identified   Assessment and Plan:  Problem List Items Addressed This Visit          Medium    Anxiety - Primary    Overview   Diazepam made anxiety worse  Has been on Klonopin in past  On lexapro in past (caused AE, cough)  Declines additional medication  [employer] Testing 12/12/24: Use As directed: Pristiq, Viibryd, Fetzima; Xanax, Buspar, Klonopin, Librium, Ativan, Restoril, Belsomra, Lunesta, Dayvigo, Serax, Tranxene; Vrayalr, Latuda,Invega, Geodon, Saphris, Navane, Caplyta; Tegretol, Lamictal, Trileptal; Guanfacine  Moderate Gene-Drug Interaction: Zoloft, Celexa, Lexapro, Trazodone; Valium (1), Ambien; Seroquel (1), Zyprexa, Clozaril, Haldol, Prolixin; Dpakote; Concerta, Focalin; Qelbree  Significant Gene-Drug Interactions: Wellbutrin, Prozac, Effexor, Remeron, Elavil, Doxepin, Imipramine, Pamelor, Trintellix, Paxil, Cymbalta, Luvox, Propranolol; Abilify, Rexulti, Risperdal, Mallaril, Fanapt, Thorazine,; STratterra         Current Assessment & Plan   Will try klonopin (as will also help ET)         Relevant Medications    clonazePAM (KlonoPIN) 0.5 mg tablet    Chronic GERD    Overview   EGD 9/19: - LA Grade A reflux esophagitis. Biopsied.              - Benign-appearing esophageal stenosis. Dilated.             - Chronic gastritis. Biopsied (-)  On Nexium         Relevant Medications    esomeprazole (NexIUM) 20 mg DR capsule    Emphysema lung (Multi)    Overview   PFTs 11/22; no obstruction, air trapping and reduced DLCO.   CXR in 9/2022 shows emphysematous changes.   CT/PE was normal.   Normal echo  S/p Pulm consult  A1AT testing (-)  Asymptomatic  Monitor         Essential tremor    Overview   On BB, could consider Propranolol in place of Toprol  S/p Neuro consult  Primidone interacts with Eliquis  Tried Gabapentin for sleep and thinks she had GI side effects         Current Assessment & Plan   Dayne try Klonopin (as will also help anxiety)           Relevant Medications    clonazePAM (KlonoPIN) 0.5 mg tablet    Hallucinations    Overview   11/24/23 MRI brain: Mild white-matter changes are nonspecific but most likely represent small-vessel ischemic disease in a patient of this age and also involve the gonzalo. No evidence of acute ischemic injury or intracranial mass effect.  Onset 12/2023 - always in middle of the night  S/p Neuro consult  No treatnent indicated         Current Assessment & Plan   Will see if klonopin helps  Maybe these are due to underlying  anxiety         Lumbar spondylosis    Overview   CT abd 7/22: Thoracolumbar dextroscoliosis with apex at L1-L2. Mild right lateral listhesis at L2-L3 and L3-L4. Grade 1 degenerative anterolisthesis at L3-L4 and L4-L5. No high-grade spinal canal narrowing. Mild discogenic  degeneration and moderate to severe to severe lumbar facet osteoarthropathy         Medication management    Overview   UDS, signed controlled substance contract, and OARRS check all up to date           Relevant Orders    Opiate/Opioid/Benzo Prescription Compliance    Paroxysmal atrial fibrillation (Multi)    Overview   7/5/22: Atrial fibrillation with rapid ventricular response, ? Secondary to colitis/infection  Holter x 40 hours 8/22  "did not see AF, but [id number] score of 3  On Eliquis and BB  Sees EP, recommend to have Monitor every 3 years  Monitor         Relevant Orders    Holter or Event Cardiac Monitor     Other Visit Diagnoses         Bilateral breast cysts        Will get repeat in 12months  at [facility]    Relevant Orders    BI mammo bilateral diagnostic      Skin lesion of cheek        Will add OTC topical HCC    Relevant Medications    mupirocin (Bactroban) 2 % ointment      Hoarseness of voice        Will get ENT consult for ;laryngoscopy  Increase PPI to 20mg BID    Relevant Orders    Referral to ENT      Rotator cuff syndrome of right shoulder        will get PT to start    Relevant Orders    Referral to Physical Therapy              ROS otherwise negative aside from what was mentioned above in HPI.    Vitals  BP 97/56   Pulse 65   Temp 36.9 °C (98.4 °F)   Ht 1.676 m (5' 6\")   Wt 69.6 kg (153 lb 6.4 oz)   SpO2 98%   BMI 24.76 kg/m²   Body mass index is 24.76 kg/m².  Physical Exam  Gen: Alert, NAD  HEENT: Unremarkable  Respiratory:  Lungs CTAB  CV: RRR  Ext: Decrased ROM right shoulder, pain with abduction and int/ext rotation  Neuro:  Gross motor and sensory intact  Skin: see picture below, raised, firm, not fluctuant and no d/c noted, erytehmatous base     Media Information      Document Information    Misc Clinical: Clinical Unknown      06/02/2025 16:59   Attached To:   Office Visit on 6/2/25 with Gracie Tim MD   Source Information    Gracie Tim MD  [place] [id number]   Document History      Allergies and Medications  Amoxicillin-pot clavulanate, Celecoxib, Enoxaparin, Gabapentin, Lexapro [escitalopram oxalate], Meloxicam, Mometasone, Moxifloxacin hcl, Naproxen, Nitrate analogues, Nitrofurantoin macrocrystal, Piroxicam, Propranolol, Ranitidine hcl, and Amitriptyline  Current Outpatient Medications   Medication Instructions    clonazePAM (KLONOPIN) 0.5 mg, oral, Nightly    Eliquis 5 mg, oral, 2 times daily    " esomeprazole (NEXIUM) 20 mg, oral, 2 times daily    Lactobacillus acidophilus (Probiotic) 10 billion cell capsule 1 capsule, Daily    metoprolol succinate XL (TOPROL-XL) 25 mg, oral, Daily, Do not crush or chew.    metroNIDAZOLE (Metrocream) 0.75 % cream 1 Application, Topical, 2 times daily, To face    mupirocin (Bactroban) 2 % ointment Topical, 3 times daily, apply to affected area       Controlled Substance Visit:  I have personally reviewed the OARRS report and have considered the risks of abuse, dependence, addiction and diversion and I believe that it is clinically appropriate for the patient to be prescribed this medication.    Is the patient prescribed a combination of a benzodiazepine and opioid? no     The last Urine Drug Screen has been reviewed and results are as expected,  and/or the UDS was ordered today if due.  Collected today    Controlled Substance Agreement Date 25    I have reviewed each line item on the Controlled Substance Agreement including, but not limited to, the benefits, risks, and alternatives to treatment with a Controlled Substance medication(s). The patient has verbalized understanding and signed the agreement.    Benzodiazepines:  What is the patient's goal of therapy? Anxiety/trmor control  Is this being achieved with current treatment? TBD    MADDIE-7:  Over the last 2 weeks, how often have you been bothered by any of the following problems?  Feeling nervous, anxious, or on edge: 1  Not being able to stop or control worryin  Worrying too much about different things: 1  Trouble relaxin  Being so restless that it is hard to sit still: 0  Becoming easily annoyed or irritable: 1  Feeling afraid as if something awful might happen: 0  MADDIE-7 Total Score: 4        Activities of Daily Living:   Is your overall impression that this patient is benefiting (symptom reduction outweighs side effects) from benzodiazepine therapy? Yes     1. Physical Functioning: Better  2. Family  Relationship: Same  3. Social Relationship: Same  4. Mood: Same  5. Sleep Patterns: Better  6. Overall Function: Better    Time spent prepping/preparing for visit as well as pre/post-visit charting: 10 minutes  Time spent directly with Patient: 30 minutes  Total Time: 40 minutes which included preparing to see the patient, face-to-face patient care, completing clinical documentation, obtaining and/or reviewing separately obtained history, performing a medically appropriate examination, ordering medications, tests, or procedures and discussing the plan of care..

## 2025-06-02 NOTE — Clinical Note
She has a strange lesion on her cheek for 6 days Just wanted you to see it ? Bite of some sort with inflammatory response? , feels firm, not fluctuant Am having her do hot compressess, topical bactroban and topic HCC for now If doesn't resolve will have her see you If you want my POC alterned just let me know

## 2025-06-03 ENCOUNTER — APPOINTMENT (OUTPATIENT)
Dept: PRIMARY CARE | Facility: CLINIC | Age: 80
End: 2025-06-03
Payer: COMMERCIAL

## 2025-06-03 ENCOUNTER — TELEPHONE (OUTPATIENT)
Dept: DERMATOLOGY | Facility: CLINIC | Age: 80
End: 2025-06-03

## 2025-06-03 NOTE — TELEPHONE ENCOUNTER
Pt LM stating her PCP spoke with provider and provider would squeeze her in. Pt states today or tomorrow will work for her.   I spoke with RP, per RP Dr. Tim stated give lesion a week and re-access lesion.   Call placed to pt and informed her of available appt 6/10/25 at . Pt agreed to appt, scheduled with RP.

## 2025-06-05 LAB
1OH-MIDAZOLAM UR-MCNC: NEGATIVE NG/ML
7AMINOCLONAZEPAM UR-MCNC: NEGATIVE NG/ML
A-OH ALPRAZ UR-MCNC: NEGATIVE NG/ML
A-OH-TRIAZOLAM UR-MCNC: NEGATIVE NG/ML
AMPHETAMINES UR QL: NEGATIVE NG/ML
BARBITURATES UR QL: NEGATIVE NG/ML
BZE UR QL: NEGATIVE NG/ML
CODEINE UR-MCNC: NEGATIVE NG/ML
CREAT UR-MCNC: 118.7 MG/DL
DRUG SCREEN COMMENT UR-IMP: NORMAL
EDDP UR-MCNC: NEGATIVE NG/ML
FENTANYL UR-MCNC: NEGATIVE NG/ML
HYDROCODONE UR-MCNC: NEGATIVE NG/ML
HYDROMORPHONE UR-MCNC: NEGATIVE NG/ML
LORAZEPAM UR-MCNC: NEGATIVE NG/ML
METHADONE UR-MCNC: NEGATIVE NG/ML
MORPHINE UR-MCNC: NEGATIVE NG/ML
NORDIAZEPAM UR-MCNC: NEGATIVE NG/ML
NORFENTANYL UR-MCNC: NEGATIVE NG/ML
NORHYDROCODONE UR CFM-MCNC: NEGATIVE NG/ML
NOROXYCODONE UR CFM-MCNC: NEGATIVE NG/ML
NORTRAMADOL UR-MCNC: NEGATIVE NG/ML
OH-ETHYLFLURAZ UR-MCNC: NEGATIVE NG/ML
OXAZEPAM UR-MCNC: NEGATIVE NG/ML
OXIDANTS UR QL: NEGATIVE MCG/ML
OXYCODONE UR CFM-MCNC: NEGATIVE NG/ML
OXYMORPHONE UR CFM-MCNC: NEGATIVE NG/ML
PCP UR QL: NEGATIVE NG/ML
PH UR: 5.2 [PH] (ref 4.5–9)
QUEST 6 ACETYLMORPHINE: NEGATIVE NG/ML
QUEST NOTES AND COMMENTS: NORMAL
QUEST ZOLPIDEM: NEGATIVE NG/ML
TEMAZEPAM UR-MCNC: NEGATIVE NG/ML
THC UR QL: NEGATIVE NG/ML
TRAMADOL UR-MCNC: NEGATIVE NG/ML
ZOLPIDEM PHENYL-4-CARB UR CFM-MCNC: NEGATIVE NG/ML

## 2025-06-10 ENCOUNTER — DOCUMENTATION (OUTPATIENT)
Dept: PRIMARY CARE | Facility: CLINIC | Age: 80
End: 2025-06-10

## 2025-06-10 ENCOUNTER — APPOINTMENT (OUTPATIENT)
Dept: DERMATOLOGY | Facility: CLINIC | Age: 80
End: 2025-06-10
Payer: MEDICARE

## 2025-06-10 DIAGNOSIS — L70.0 ACNE VULGARIS: Primary | ICD-10-CM

## 2025-06-10 PROCEDURE — 1159F MED LIST DOCD IN RCRD: CPT | Performed by: DERMATOLOGY

## 2025-06-10 PROCEDURE — 99212 OFFICE O/P EST SF 10 MIN: CPT | Performed by: DERMATOLOGY

## 2025-06-10 PROCEDURE — 1036F TOBACCO NON-USER: CPT | Performed by: DERMATOLOGY

## 2025-06-10 ASSESSMENT — DERMATOLOGY QUALITY OF LIFE (QOL) ASSESSMENT
RATE HOW BOTHERED YOU ARE BY EFFECTS OF YOUR SKIN PROBLEMS ON YOUR ACTIVITIES (EG, GOING OUT, ACCOMPLISHING WHAT YOU WANT, WORK ACTIVITIES OR YOUR RELATIONSHIPS WITH OTHERS): 4
RATE HOW BOTHERED YOU ARE BY EFFECTS OF YOUR SKIN PROBLEMS ON YOUR ACTIVITIES (EG, GOING OUT, ACCOMPLISHING WHAT YOU WANT, WORK ACTIVITIES OR YOUR RELATIONSHIPS WITH OTHERS): 4
RATE HOW BOTHERED YOU ARE BY SYMPTOMS OF YOUR SKIN PROBLEM (EG, ITCHING, STINGING BURNING, HURTING OR SKIN IRRITATION): 4
RATE HOW BOTHERED YOU ARE BY SYMPTOMS OF YOUR SKIN PROBLEM (EG, ITCHING, STINGING BURNING, HURTING OR SKIN IRRITATION): 4
RATE HOW EMOTIONALLY BOTHERED YOU ARE BY YOUR SKIN PROBLEM (FOR EXAMPLE, WORRY, EMBARRASSMENT, FRUSTRATION): 4
RATE HOW EMOTIONALLY BOTHERED YOU ARE BY YOUR SKIN PROBLEM (FOR EXAMPLE, WORRY, EMBARRASSMENT, FRUSTRATION): 4

## 2025-06-10 ASSESSMENT — PATIENT GLOBAL ASSESSMENT (PGA): WHAT IS THE PGA: PATIENT GLOBAL ASSESSMENT:  1 - CLEAR

## 2025-06-10 NOTE — PROGRESS NOTES
Subjective     Adelia Munson is a 80 y.o. female who presents for the following: Suspicious Skin Lesion (Pt her for lesion on left cheek. Present x2 weeks. Lesion was previously itchy, now just red. She used anti itch cream, mupirocin ointment. ).     Intake Questions  Do you have any new or changing Lesions?: (Patient-Rptd) (P) Yes  Where are these new or changing lesion(s) located?: (Patient-Rptd) (P) left cheek  Have you had or do you have a Staph Infection?: (Patient-Rptd) (P) No  Have you had or do you have Vacular Disease?: (Patient-Rptd) (P) No  Do you use sunscreen?: (Patient-Rptd) (P) Occasionally  Do you use a tanning bed?: (Patient-Rptd) (P) No  Are you trying to get pregnant?: (Patient-Rptd) (P) No  Are you on birth control?: (Patient-Rptd) (P) No  Do you have irregular menstrual cycles?: (Patient-Rptd) (P) No    Review of Systems:  No other skin or systemic complaints other than what is documented elsewhere in the note.    The following portions of the chart were reviewed this encounter and updated as appropriate:          Skin Cancer History  Biopsy Log Book  No skin cancers from Specimen Tracking.    Additional History      Specialty Problems    None       Objective   Well appearing patient in no apparent distress; mood and affect are within normal limits.    A focused skin examination was performed of the face. All findings within normal limits unless otherwise noted below.    Assessment/Plan   Skin Exam  1. ACNE VULGARIS  Left Parotid Area  Solitary erythematous follicular based papule with central pore  Acneiform papule - benign, reassured.    Lesion contents expressed with 18 gauge needle and cutips.  Destr of lesion - Left Parotid Area  Complexity: simple    Destruction method comment:  18 gauge needle and cutips  Informed consent: discussed and consent obtained    Procedure prep:  Patient prepped in sterile fashion  Hemostasis achieved with:  pressure  Outcome: patient tolerated procedure well  with no complications    Post-procedure details: sterile dressing applied and wound care instructions given    Dressing type: bandage

## 2025-06-10 NOTE — Clinical Note
Acneiform papule - benign, reassured.    Lesion contents expressed with 18 gauge needle and cutips.

## 2025-06-12 ENCOUNTER — TELEPHONE (OUTPATIENT)
Dept: PRIMARY CARE | Facility: CLINIC | Age: 80
End: 2025-06-12
Payer: MEDICARE

## 2025-06-12 NOTE — TELEPHONE ENCOUNTER
14 day o [id number] no auth is required per Espinoza at [facility] call ref# [id number]

## 2025-06-13 ENCOUNTER — APPOINTMENT (OUTPATIENT)
Dept: CARDIOLOGY | Facility: CLINIC | Age: 80
End: 2025-06-13
Payer: MEDICARE

## 2025-06-25 ENCOUNTER — APPOINTMENT (OUTPATIENT)
Dept: ORTHOPEDIC SURGERY | Facility: CLINIC | Age: 80
End: 2025-06-25
Payer: MEDICARE

## 2025-07-01 ENCOUNTER — APPOINTMENT (OUTPATIENT)
Dept: CARDIOLOGY | Facility: CLINIC | Age: 80
End: 2025-07-01
Payer: MEDICARE

## 2025-07-01 DIAGNOSIS — I48.0 PAROXYSMAL ATRIAL FIBRILLATION (MULTI): ICD-10-CM

## 2025-07-29 PROCEDURE — 93248 EXT ECG>7D<15D REV&INTERPJ: CPT | Performed by: INTERNAL MEDICINE

## 2025-08-26 PROCEDURE — RXMED WILLOW AMBULATORY MEDICATION CHARGE

## 2025-08-27 ENCOUNTER — APPOINTMENT (OUTPATIENT)
Dept: NEUROLOGY | Facility: CLINIC | Age: 80
End: 2025-08-27
Payer: MEDICARE

## 2025-08-27 VITALS
SYSTOLIC BLOOD PRESSURE: 120 MMHG | HEART RATE: 67 BPM | BODY MASS INDEX: 24.28 KG/M2 | TEMPERATURE: 97.2 F | HEIGHT: 66 IN | WEIGHT: 151.1 LBS | DIASTOLIC BLOOD PRESSURE: 72 MMHG

## 2025-08-27 DIAGNOSIS — G25.0 ESSENTIAL TREMOR: Primary | ICD-10-CM

## 2025-08-27 DIAGNOSIS — R42 VERTIGO: ICD-10-CM

## 2025-08-27 DIAGNOSIS — R51.9 PRESSURE IN HEAD: ICD-10-CM

## 2025-08-27 PROCEDURE — 1036F TOBACCO NON-USER: CPT | Performed by: PSYCHIATRY & NEUROLOGY

## 2025-08-27 PROCEDURE — 1159F MED LIST DOCD IN RCRD: CPT | Performed by: PSYCHIATRY & NEUROLOGY

## 2025-08-27 PROCEDURE — 99213 OFFICE O/P EST LOW 20 MIN: CPT | Performed by: PSYCHIATRY & NEUROLOGY

## 2025-08-27 ASSESSMENT — LIFESTYLE VARIABLES
AUDIT-C TOTAL SCORE: 2
SKIP TO QUESTIONS 9-10: 1
HOW OFTEN DO YOU HAVE A DRINK CONTAINING ALCOHOL: 2-4 TIMES A MONTH
HOW OFTEN DO YOU HAVE SIX OR MORE DRINKS ON ONE OCCASION: NEVER
HOW MANY STANDARD DRINKS CONTAINING ALCOHOL DO YOU HAVE ON A TYPICAL DAY: 1 OR 2

## 2025-08-28 ENCOUNTER — PHARMACY VISIT (OUTPATIENT)
Dept: PHARMACY | Facility: CLINIC | Age: 80
End: 2025-08-28
Payer: COMMERCIAL

## 2025-09-02 ENCOUNTER — APPOINTMENT (OUTPATIENT)
Dept: PRIMARY CARE | Facility: CLINIC | Age: 80
End: 2025-09-02
Payer: MEDICARE

## 2025-11-10 ENCOUNTER — APPOINTMENT (OUTPATIENT)
Dept: NEUROLOGY | Facility: CLINIC | Age: 80
End: 2025-11-10
Payer: COMMERCIAL

## 2026-01-08 ENCOUNTER — APPOINTMENT (OUTPATIENT)
Dept: PRIMARY CARE | Facility: CLINIC | Age: 81
End: 2026-01-08
Payer: COMMERCIAL

## 2026-05-08 ENCOUNTER — APPOINTMENT (OUTPATIENT)
Dept: DERMATOLOGY | Facility: CLINIC | Age: 81
End: 2026-05-08
Payer: MEDICARE

## 2026-08-26 ENCOUNTER — APPOINTMENT (OUTPATIENT)
Dept: NEUROLOGY | Facility: CLINIC | Age: 81
End: 2026-08-26
Payer: MEDICARE